# Patient Record
Sex: MALE | Race: WHITE | NOT HISPANIC OR LATINO | Employment: OTHER | URBAN - METROPOLITAN AREA
[De-identification: names, ages, dates, MRNs, and addresses within clinical notes are randomized per-mention and may not be internally consistent; named-entity substitution may affect disease eponyms.]

---

## 2017-01-20 RX ORDER — ERGOCALCIFEROL 1.25 MG/1
50000 CAPSULE ORAL WEEKLY
Qty: 12 CAPSULE | Refills: 0 | Status: SHIPPED | OUTPATIENT
Start: 2017-01-20 | End: 2017-03-24 | Stop reason: SDUPTHER

## 2017-01-30 DIAGNOSIS — K21.9 GASTROESOPHAGEAL REFLUX DISEASE, ESOPHAGITIS PRESENCE NOT SPECIFIED: ICD-10-CM

## 2017-01-30 RX ORDER — PANTOPRAZOLE SODIUM 40 MG/1
TABLET, DELAYED RELEASE ORAL
Qty: 30 TABLET | Refills: 3 | Status: SHIPPED | OUTPATIENT
Start: 2017-01-30 | End: 2017-04-28 | Stop reason: SDUPTHER

## 2017-02-21 ENCOUNTER — OFFICE VISIT (OUTPATIENT)
Dept: INTERNAL MEDICINE | Facility: CLINIC | Age: 62
End: 2017-02-21

## 2017-02-21 VITALS
HEART RATE: 62 BPM | SYSTOLIC BLOOD PRESSURE: 128 MMHG | HEIGHT: 69 IN | RESPIRATION RATE: 16 BRPM | WEIGHT: 154.6 LBS | TEMPERATURE: 98.1 F | DIASTOLIC BLOOD PRESSURE: 62 MMHG | BODY MASS INDEX: 22.9 KG/M2

## 2017-02-21 DIAGNOSIS — I48.0 PAROXYSMAL ATRIAL FIBRILLATION (HCC): ICD-10-CM

## 2017-02-21 DIAGNOSIS — I25.10 CORONARY ARTERY DISEASE INVOLVING NATIVE CORONARY ARTERY OF NATIVE HEART WITHOUT ANGINA PECTORIS: ICD-10-CM

## 2017-02-21 DIAGNOSIS — E78.5 HYPERLIPIDEMIA, UNSPECIFIED HYPERLIPIDEMIA TYPE: ICD-10-CM

## 2017-02-21 DIAGNOSIS — Z11.59 NEED FOR HEPATITIS C SCREENING TEST: ICD-10-CM

## 2017-02-21 DIAGNOSIS — I10 ESSENTIAL HYPERTENSION: Primary | Chronic | ICD-10-CM

## 2017-02-21 DIAGNOSIS — R53.83 FATIGUE, UNSPECIFIED TYPE: ICD-10-CM

## 2017-02-21 DIAGNOSIS — I63.312 CEREBROVASCULAR ACCIDENT (CVA) DUE TO THROMBOSIS OF LEFT MIDDLE CEREBRAL ARTERY (HCC): ICD-10-CM

## 2017-02-21 LAB
BASOPHILS # BLD AUTO: 0.03 10*3/MM3 (ref 0–0.2)
BASOPHILS NFR BLD AUTO: 0.6 % (ref 0–1.5)
CHOLEST SERPL-MCNC: 141 MG/DL (ref 0–200)
EOSINOPHIL # BLD AUTO: 0.13 10*3/MM3 (ref 0–0.7)
EOSINOPHIL NFR BLD AUTO: 2.5 % (ref 0.3–6.2)
ERYTHROCYTE [DISTWIDTH] IN BLOOD BY AUTOMATED COUNT: 14.1 % (ref 11.5–14.5)
HCT VFR BLD AUTO: 41.2 % (ref 40.4–52.2)
HDLC SERPL-MCNC: 64 MG/DL (ref 40–60)
HGB BLD-MCNC: 13.6 G/DL (ref 13.7–17.6)
IMM GRANULOCYTES # BLD: 0 10*3/MM3 (ref 0–0.03)
IMM GRANULOCYTES NFR BLD: 0 % (ref 0–0.5)
LDLC SERPL CALC-MCNC: 47 MG/DL (ref 0–100)
LYMPHOCYTES # BLD AUTO: 1.39 10*3/MM3 (ref 0.9–4.8)
LYMPHOCYTES NFR BLD AUTO: 27.1 % (ref 19.6–45.3)
MCH RBC QN AUTO: 32.4 PG (ref 27–32.7)
MCHC RBC AUTO-ENTMCNC: 33 G/DL (ref 32.6–36.4)
MCV RBC AUTO: 98.1 FL (ref 79.8–96.2)
MONOCYTES # BLD AUTO: 0.54 10*3/MM3 (ref 0.2–1.2)
MONOCYTES NFR BLD AUTO: 10.5 % (ref 5–12)
NEUTROPHILS # BLD AUTO: 3.03 10*3/MM3 (ref 1.9–8.1)
NEUTROPHILS NFR BLD AUTO: 59.3 % (ref 42.7–76)
PLATELET # BLD AUTO: 205 10*3/MM3 (ref 140–500)
RBC # BLD AUTO: 4.2 10*6/MM3 (ref 4.6–6)
TRIGL SERPL-MCNC: 151 MG/DL (ref 0–150)
VLDLC SERPL CALC-MCNC: 30.2 MG/DL (ref 5–40)
WBC # BLD AUTO: 5.12 10*3/MM3 (ref 4.5–10.7)

## 2017-02-21 PROCEDURE — 99214 OFFICE O/P EST MOD 30 MIN: CPT | Performed by: INTERNAL MEDICINE

## 2017-02-21 RX ORDER — CLOPIDOGREL BISULFATE 75 MG/1
75 TABLET ORAL DAILY
COMMUNITY
End: 2017-08-09

## 2017-02-21 NOTE — PROGRESS NOTES
Subjective   Brian Barriga is a 61 y.o. male.     Chief Complaint   Patient presents with   • Hyperlipidemia   • Hypertension         Hyperlipidemia   This is a chronic problem. The current episode started more than 1 year ago. The problem is controlled. Recent lipid tests were reviewed and are normal. Factors aggravating his hyperlipidemia include beta blockers. Pertinent negatives include no chest pain or shortness of breath. Current antihyperlipidemic treatment includes statins. The current treatment provides significant improvement of lipids. There are no compliance problems.  Risk factors for coronary artery disease include dyslipidemia, hypertension and male sex.   Hypertension   This is a chronic problem. The current episode started more than 1 year ago. The problem is unchanged. The problem is controlled. Pertinent negatives include no chest pain, headaches, palpitations, peripheral edema or shortness of breath. There are no associated agents to hypertension. Risk factors for coronary artery disease include dyslipidemia and male gender. Past treatments include beta blockers. The current treatment provides significant improvement. There are no compliance problems.  Hypertensive end-organ damage includes CAD/MI and CVA. There is no history of angina or kidney disease.   Coronary Artery Disease   Presents for follow-up visit. Pertinent negatives include no chest pain, chest pressure, chest tightness, leg swelling, palpitations or shortness of breath. Risk factors include hyperlipidemia. The symptoms have been resolved. Compliance with diet is good. Compliance with exercise is good. Compliance with medications is good.        The following portions of the patient's history were reviewed and updated as appropriate: allergies, current medications, past social history and problem list.    Outpatient Prescriptions Marked as Taking for the 2/21/17 encounter (Office Visit) with Robert Light MD   Medication Sig  Dispense Refill   • aspirin 81 MG EC tablet Take 81 mg by mouth Daily.     • atorvastatin (LIPITOR) 40 MG tablet Take 40 mg by mouth daily.     • azelastine (ASTEPRO) 0.15 % solution nasal spray U 2 SPRAYS IEN D  11   • clopidogrel (PLAVIX) 75 MG tablet Take 75 mg by mouth Daily.     • finasteride (PROSCAR) 5 MG tablet Take 1 tablet by mouth daily.     • fluticasone (FLONASE) 50 MCG/ACT nasal spray SHAKE WELL AND U 2 SPRAYS IEN D  11   • folic acid (FOLVITE) 1 MG tablet TAKE 1 TABLET BY MOUTH DAILY 90 tablet 0   • levocetirizine (XYZAL) 5 MG tablet TAKE 1 TABLET BY MOUTH EVERY EVENING 90 tablet 1   • meclizine (ANTIVERT) 12.5 MG tablet Take 12.5 mg by mouth As Needed for dizziness.     • metoprolol tartrate (LOPRESSOR) 25 MG tablet Take 25 mg by mouth Daily. Half a tab daily     • montelukast (SINGULAIR) 10 MG tablet TAKE 1 TABLET BY MOUTH EVERY DAY 90 tablet 1   • Multiple Vitamin (MULTI VITAMIN DAILY PO) Take  by mouth.     • pantoprazole (PROTONIX) 40 MG EC tablet Take 1 tablet by mouth every morning 30 minutes before breakfast 30 tablet 3   • vitamin D (ERGOCALCIFEROL) 90728 UNITS capsule capsule Take 1 capsule by mouth 1 (One) Time Per Week. 12 capsule 0       Review of Systems   Constitutional: Positive for fatigue. Negative for chills and fever.   Respiratory: Negative for cough, chest tightness, shortness of breath and wheezing.    Cardiovascular: Negative for chest pain, palpitations and leg swelling.   Gastrointestinal: Negative for abdominal pain, constipation, diarrhea, nausea and vomiting.   Neurological: Negative for headaches.       Objective   Vitals:    02/21/17 1437   BP: 128/62   Pulse: 62   Resp: 16   Temp: 98.1 °F (36.7 °C)      Last Weight    02/21/17  1437   Weight: 154 lb 9.6 oz (70.1 kg)    [unfilled]  Body mass index is 22.83 kg/(m^2).      Physical Exam   Constitutional: He appears well-developed and well-nourished. No distress.   HENT:   Head: Normocephalic and atraumatic.   Neck:  Carotid bruit is not present. No thyromegaly present.   Cardiovascular: Normal rate, regular rhythm, normal heart sounds and intact distal pulses.  Exam reveals no gallop.    No murmur heard.  Pulmonary/Chest: Effort normal and breath sounds normal. No respiratory distress. He has no wheezes. He has no rales.   Abdominal: Soft. Bowel sounds are normal. He exhibits no mass. There is no tenderness. There is no guarding.         Problem List Items Addressed This Visit        Cardiovascular and Mediastinum    Hypertension - Primary (Chronic)    Paroxysmal atrial fibrillation    Relevant Medications    clopidogrel (PLAVIX) 75 MG tablet    Hyperlipidemia    Relevant Orders    Lipid Panel    Coronary artery disease involving native coronary artery of native heart    Relevant Medications    clopidogrel (PLAVIX) 75 MG tablet    Stroke syndrome      Other Visit Diagnoses     Fatigue, unspecified type        Relevant Orders    CBC & Differential    Need for hepatitis C screening test            Assessment/Plan   In for recheck of multiple medical problems.  Hypertension, hyperlipidemia, CAD, PAF.  History of stroke and hypercoagulable state.  He's been fatigued for the last several days area no specific reason for this.  He's usually pretty high energy.  Works out regularly.  He says several outbreaks of herpes labialis in the last year.  Had annual lab work August 2016.  Gets lipids every 3 months.  We'll add a CBC today with his fatigue.  Hep C antibody today.         Dragon disclaimer:   Much of this encounter note is an electronic transcription/translation of spoken language to printed text. The electronic translation of spoken language may permit erroneous, or at times, nonsensical words or phrases to be inadvertently transcribed; Although I have reviewed the note for such errors, some may still exist.

## 2017-02-22 LAB — HCV AB S/CO SERPL IA: <0.1 S/CO RATIO (ref 0–0.9)

## 2017-03-15 ENCOUNTER — OFFICE VISIT (OUTPATIENT)
Dept: INTERNAL MEDICINE | Facility: CLINIC | Age: 62
End: 2017-03-15

## 2017-03-15 VITALS
OXYGEN SATURATION: 99 % | SYSTOLIC BLOOD PRESSURE: 106 MMHG | TEMPERATURE: 97.7 F | WEIGHT: 155.4 LBS | HEIGHT: 69 IN | DIASTOLIC BLOOD PRESSURE: 76 MMHG | HEART RATE: 62 BPM | RESPIRATION RATE: 16 BRPM | BODY MASS INDEX: 23.02 KG/M2

## 2017-03-15 DIAGNOSIS — J06.9 ACUTE URI: Primary | ICD-10-CM

## 2017-03-15 DIAGNOSIS — J02.9 SORE THROAT: ICD-10-CM

## 2017-03-15 LAB
EXPIRATION DATE: NORMAL
INTERNAL CONTROL: NORMAL
Lab: NORMAL
S PYO AG THROAT QL: NEGATIVE

## 2017-03-15 PROCEDURE — 99213 OFFICE O/P EST LOW 20 MIN: CPT | Performed by: INTERNAL MEDICINE

## 2017-03-15 PROCEDURE — 87880 STREP A ASSAY W/OPTIC: CPT | Performed by: INTERNAL MEDICINE

## 2017-03-15 RX ORDER — GUAIFENESIN 600 MG/1
1200 TABLET, EXTENDED RELEASE ORAL DAILY PRN
COMMUNITY
End: 2017-04-28

## 2017-03-15 RX ORDER — VALACYCLOVIR HYDROCHLORIDE 1 G/1
TABLET, FILM COATED ORAL
COMMUNITY
Start: 2016-02-14 | End: 2017-03-15

## 2017-03-15 RX ORDER — METOPROLOL SUCCINATE 25 MG/1
TABLET, EXTENDED RELEASE ORAL
Refills: 5 | COMMUNITY
Start: 2017-03-03 | End: 2018-07-25

## 2017-03-15 RX ORDER — VALACYCLOVIR HYDROCHLORIDE 1 G/1
TABLET, FILM COATED ORAL
Refills: 2 | COMMUNITY
Start: 2017-02-14 | End: 2017-11-01

## 2017-03-15 NOTE — PROGRESS NOTES
Subjective   Brian Barriga is a 61 y.o. male.     Chief Complaint   Patient presents with   • Sore Throat         Sore Throat    This is a new problem. The current episode started in the past 7 days. The problem has been gradually worsening. Neither side of throat is experiencing more pain than the other. There has been no fever. Associated symptoms include congestion, ear pain and trouble swallowing. Pertinent negatives include no coughing, ear discharge, headaches, shortness of breath or swollen glands. The treatment provided no relief.        The following portions of the patient's history were reviewed and updated as appropriate: allergies, current medications, past social history and problem list.    Outpatient Prescriptions Marked as Taking for the 3/15/17 encounter (Office Visit) with Robert Light MD   Medication Sig Dispense Refill   • aspirin 81 MG EC tablet Take 81 mg by mouth Daily.     • atorvastatin (LIPITOR) 40 MG tablet Take 40 mg by mouth daily.     • azelastine (ASTEPRO) 0.15 % solution nasal spray U 2 SPRAYS IEN D  11   • clopidogrel (PLAVIX) 75 MG tablet Take 75 mg by mouth Daily.     • finasteride (PROSCAR) 5 MG tablet Take 1 tablet by mouth daily.     • fluticasone (FLONASE) 50 MCG/ACT nasal spray SHAKE WELL AND U 2 SPRAYS IEN D  11   • folic acid (FOLVITE) 1 MG tablet TAKE 1 TABLET BY MOUTH DAILY 90 tablet 0   • guaiFENesin (MUCINEX) 600 MG 12 hr tablet Take 1,200 mg by mouth Daily As Needed for Congestion.     • levocetirizine (XYZAL) 5 MG tablet TAKE 1 TABLET BY MOUTH EVERY EVENING 90 tablet 1   • meclizine (ANTIVERT) 12.5 MG tablet Take 12.5 mg by mouth As Needed for dizziness.     • metoprolol succinate XL (TOPROL-XL) 25 MG 24 hr tablet TK 1 T PO QAM  5   • montelukast (SINGULAIR) 10 MG tablet TAKE 1 TABLET BY MOUTH EVERY DAY 90 tablet 1   • Multiple Vitamin (MULTI VITAMIN DAILY PO) Take 1 tablet by mouth Daily.     • pantoprazole (PROTONIX) 40 MG EC tablet Take 1 tablet by mouth every  morning 30 minutes before breakfast 30 tablet 3   • valACYclovir (VALTREX) 1000 MG tablet TK 2 TS PO Q 12HOURS FOR 1 DAY UTD  2   • vitamin D (ERGOCALCIFEROL) 21947 UNITS capsule capsule Take 1 capsule by mouth 1 (One) Time Per Week. 12 capsule 0   • [DISCONTINUED] valACYclovir (VALTREX) 1000 MG tablet          Review of Systems   Constitutional: Positive for diaphoresis. Negative for chills, fatigue and fever.   HENT: Positive for congestion, ear pain, sneezing, sore throat, trouble swallowing and voice change. Negative for ear discharge, postnasal drip, rhinorrhea and sinus pressure.    Respiratory: Negative for cough, shortness of breath and wheezing.    Neurological: Negative for headaches.       Objective   Vitals:    03/15/17 0920   BP: 106/76   Pulse: 62   Resp: 16   Temp: 97.7 °F (36.5 °C)   SpO2: 99%      Last Weight    03/15/17  0920   Weight: 155 lb 6.4 oz (70.5 kg)    [unfilled]  Body mass index is 22.95 kg/(m^2).      Physical Exam   Constitutional: He appears well-developed and well-nourished.   HENT:   Head: Normocephalic and atraumatic.   Right Ear: External ear normal.   Left Ear: External ear normal.   Nose: Nose normal.   Mouth/Throat: Oropharynx is clear and moist.   Eyes: Conjunctivae are normal. Pupils are equal, round, and reactive to light.   Pulmonary/Chest: Effort normal and breath sounds normal. No respiratory distress. He has no wheezes. He has no rales.   Skin: Skin is warm and dry.         Problem List Items Addressed This Visit     None      Visit Diagnoses     Acute URI    -  Primary        Assessment/Plan   In with 1 week illness.  Sore throat.  Body aches.  Not sure if he is having a fever but these had some drenching sweats at night.  His examination is normal today.  We'll check a strep screen.  This may well be the flu.  We'll discuss symptomatically for now with Tylenol and fluids and rest.         Dragon disclaimer:   Much of this encounter note is an electronic  transcription/translation of spoken language to printed text. The electronic translation of spoken language may permit erroneous, or at times, nonsensical words or phrases to be inadvertently transcribed; Although I have reviewed the note for such errors, some may still exist.

## 2017-03-24 DIAGNOSIS — J30.1 ALLERGIC RHINITIS DUE TO POLLEN: ICD-10-CM

## 2017-03-24 DIAGNOSIS — E78.5 HYPERLIPIDEMIA: ICD-10-CM

## 2017-03-24 RX ORDER — ERGOCALCIFEROL 1.25 MG/1
CAPSULE ORAL
Qty: 12 CAPSULE | Refills: 1 | Status: SHIPPED | OUTPATIENT
Start: 2017-03-24 | End: 2017-08-31 | Stop reason: SDUPTHER

## 2017-03-24 RX ORDER — FOLIC ACID 1 MG/1
TABLET ORAL
Qty: 90 TABLET | Refills: 1 | Status: SHIPPED | OUTPATIENT
Start: 2017-03-24 | End: 2017-04-28

## 2017-03-24 RX ORDER — LEVOCETIRIZINE DIHYDROCHLORIDE 5 MG/1
TABLET, FILM COATED ORAL
Qty: 90 TABLET | Refills: 0 | Status: SHIPPED | OUTPATIENT
Start: 2017-03-24 | End: 2017-08-09

## 2017-04-28 ENCOUNTER — OFFICE VISIT (OUTPATIENT)
Dept: INTERNAL MEDICINE | Facility: CLINIC | Age: 62
End: 2017-04-28

## 2017-04-28 VITALS
TEMPERATURE: 97.6 F | BODY MASS INDEX: 23.96 KG/M2 | HEIGHT: 69 IN | RESPIRATION RATE: 16 BRPM | WEIGHT: 161.8 LBS | OXYGEN SATURATION: 97 % | DIASTOLIC BLOOD PRESSURE: 82 MMHG | HEART RATE: 58 BPM | SYSTOLIC BLOOD PRESSURE: 138 MMHG

## 2017-04-28 DIAGNOSIS — K21.9 GASTROESOPHAGEAL REFLUX DISEASE, ESOPHAGITIS PRESENCE NOT SPECIFIED: ICD-10-CM

## 2017-04-28 DIAGNOSIS — Z71.1 CONCERN ABOUT STD IN MALE WITHOUT DIAGNOSIS: Primary | ICD-10-CM

## 2017-04-28 PROCEDURE — 99214 OFFICE O/P EST MOD 30 MIN: CPT | Performed by: INTERNAL MEDICINE

## 2017-04-28 RX ORDER — PANTOPRAZOLE SODIUM 40 MG/1
TABLET, DELAYED RELEASE ORAL
Qty: 90 TABLET | Refills: 1 | Status: SHIPPED | OUTPATIENT
Start: 2017-04-28 | End: 2017-11-15 | Stop reason: SDUPTHER

## 2017-04-28 RX ORDER — MELOXICAM 7.5 MG/1
7.5 TABLET ORAL DAILY
COMMUNITY
End: 2017-11-01

## 2017-04-28 NOTE — PROGRESS NOTES
Subjective   Brian Barriga is a 61 y.o. male.     Chief Complaint   Patient presents with   • Arm Pain     x 3 months, weakness,   • Hand Pain     x 2 years, requesting STD panel         Arm Pain    The incident occurred more than 1 week ago. There was no injury mechanism. The pain is present in the right forearm, right shoulder and right hand. The quality of the pain is described as aching. The pain radiates to the right arm. The pain is moderate. The pain has been intermittent since the incident. Associated symptoms include muscle weakness. Pertinent negatives include no numbness or tingling.   Hand Pain    The incident occurred more than 1 week ago. The injury mechanism was a direct blow. The pain is present in the right hand. The quality of the pain is described as aching. The pain does not radiate. The pain is moderate. The pain has been intermittent since the incident. Associated symptoms include muscle weakness. Pertinent negatives include no numbness or tingling. The symptoms are aggravated by movement.        The following portions of the patient's history were reviewed and updated as appropriate: allergies, current medications, past social history and problem list.    Outpatient Prescriptions Marked as Taking for the 4/28/17 encounter (Office Visit) with Robert Light MD   Medication Sig Dispense Refill   • aspirin 81 MG EC tablet Take 81 mg by mouth Daily.     • atorvastatin (LIPITOR) 40 MG tablet Take 40 mg by mouth daily.     • azelastine (ASTEPRO) 0.15 % solution nasal spray U 2 SPRAYS IEN D  11   • clopidogrel (PLAVIX) 75 MG tablet Take 75 mg by mouth Daily.     • finasteride (PROSCAR) 5 MG tablet Take 1 tablet by mouth daily.     • fluticasone (FLONASE) 50 MCG/ACT nasal spray SHAKE WELL AND U 2 SPRAYS IEN D  11   • folic acid (FOLVITE) 1 MG tablet TAKE 1 TABLET BY MOUTH DAILY 90 tablet 0   • levocetirizine (XYZAL) 5 MG tablet TAKE 1 TABLET BY MOUTH EVERY EVENING 90 tablet 0   • meclizine  (ANTIVERT) 12.5 MG tablet Take 12.5 mg by mouth As Needed for dizziness.     • meloxicam (MOBIC) 7.5 MG tablet Take 7.5 mg by mouth Daily.     • metoprolol succinate XL (TOPROL-XL) 25 MG 24 hr tablet TK 1 T PO QAM  5   • montelukast (SINGULAIR) 10 MG tablet TAKE 1 TABLET BY MOUTH EVERY DAY 90 tablet 1   • Multiple Vitamin (MULTI VITAMIN DAILY PO) Take 1 tablet by mouth Daily.     • pantoprazole (PROTONIX) 40 MG EC tablet Take 1 tablet by mouth every morning 30 minutes before breakfast 90 tablet 1   • valACYclovir (VALTREX) 1000 MG tablet TK 2 TS PO Q 12HOURS FOR 1 DAY UTD  2   • vitamin D (ERGOCALCIFEROL) 51816 UNITS capsule capsule TAKE 1 CAPSULE BY MOUTH 1 TIME EVERY WEEK 12 capsule 1   • [DISCONTINUED] guaiFENesin (MUCINEX) 600 MG 12 hr tablet Take 1,200 mg by mouth Daily As Needed for Congestion.     • [DISCONTINUED] pantoprazole (PROTONIX) 40 MG EC tablet Take 1 tablet by mouth every morning 30 minutes before breakfast 30 tablet 3       Review of Systems   Constitutional: Negative for chills and fever.   Musculoskeletal: Positive for arthralgias, myalgias and neck pain. Negative for joint swelling and neck stiffness.   Neurological: Negative for tingling and numbness.       Objective   Vitals:    04/28/17 1320   BP: 138/82   Pulse: 58   Resp: 16   Temp: 97.6 °F (36.4 °C)   SpO2: 97%      Last Weight    04/28/17  1320   Weight: 161 lb 12.8 oz (73.4 kg)    [unfilled]  Body mass index is 23.89 kg/(m^2).      Physical Exam   Constitutional: He appears well-developed and well-nourished.   Musculoskeletal: Normal range of motion. He exhibits no edema, tenderness or deformity.   Neurological: He is alert. He has normal reflexes.   Skin: Skin is warm and dry.         Problem List Items Addressed This Visit        Digestive    Gastroesophageal reflux disease (Chronic)    Relevant Medications    pantoprazole (PROTONIX) 40 MG EC tablet        Assessment/Plan   In with pain in right arm.  It starts at the level of the  right index MCP.  He had a partial tear of that tendon a 1 time.  The pain radiates up the forearm all the way up to the shoulder.  He thinks the right arms a little weak.  This is the arm that had a stroke a few years ago.  Initially it was still has stronger arm after rehabilitation and he is right-handed.  It seems like a subsequent got a little weaker.  He's been seen by 2 different hand surgeons including foods and [facility] and Dr. Luna.  Nothing surgical has been offered they can relieve his symptoms.  It may be that this is all related to his stroke.  We'll try on some co Q 10 200 mg per day since he is on atorvastatin.  Options including trying off the atorvastatin for a while or perhaps cutting down on his exercise for a while.  Wants an STD screen.  Has a new sexual partner over past  Year.  Spent 25 minutes with patient over 50% counseling him on STD and reasons for and against screening.  I advised him against testing but he wants a Type 2 test.  He knows he is postive for 1.  Was tested for both about one year ago.         Dragon disclaimer:   Much of this encounter note is an electronic transcription/translation of spoken language to printed text. The electronic translation of spoken language may permit erroneous, or at times, nonsensical words or phrases to be inadvertently transcribed; Although I have reviewed the note for such errors, some may still exist.

## 2017-04-29 LAB — HSV2 IGG SER IA-ACNC: <0.91 INDEX (ref 0–0.9)

## 2017-05-01 LAB
HIV 1+2 AB+HIV1 P24 AG SERPL QL IA: NON REACTIVE
RPR SER QL: NORMAL

## 2017-05-02 LAB
C TRACH RRNA SPEC QL NAA+PROBE: NEGATIVE
N GONORRHOEA RRNA SPEC QL NAA+PROBE: NEGATIVE

## 2017-06-14 RX ORDER — CLOPIDOGREL BISULFATE 75 MG/1
TABLET ORAL
Qty: 90 TABLET | Refills: 0 | OUTPATIENT
Start: 2017-06-14

## 2017-06-22 RX ORDER — CLOPIDOGREL BISULFATE 75 MG/1
TABLET ORAL
Qty: 90 TABLET | Refills: 0 | Status: SHIPPED | OUTPATIENT
Start: 2017-06-22 | End: 2017-09-16 | Stop reason: SDUPTHER

## 2017-06-26 DIAGNOSIS — J30.1 ALLERGIC RHINITIS DUE TO POLLEN: ICD-10-CM

## 2017-06-26 RX ORDER — LEVOCETIRIZINE DIHYDROCHLORIDE 5 MG/1
TABLET, FILM COATED ORAL
Qty: 90 TABLET | Refills: 0 | OUTPATIENT
Start: 2017-06-26

## 2017-07-20 DIAGNOSIS — J30.1 ALLERGIC RHINITIS DUE TO POLLEN: ICD-10-CM

## 2017-07-20 RX ORDER — LEVOCETIRIZINE DIHYDROCHLORIDE 5 MG/1
TABLET, FILM COATED ORAL
Qty: 90 TABLET | Refills: 0 | Status: SHIPPED | OUTPATIENT
Start: 2017-07-20 | End: 2018-07-23 | Stop reason: SDUPTHER

## 2017-08-09 ENCOUNTER — OFFICE VISIT (OUTPATIENT)
Dept: INTERNAL MEDICINE | Facility: CLINIC | Age: 62
End: 2017-08-09

## 2017-08-09 VITALS
RESPIRATION RATE: 16 BRPM | BODY MASS INDEX: 22.63 KG/M2 | HEIGHT: 69 IN | OXYGEN SATURATION: 98 % | SYSTOLIC BLOOD PRESSURE: 104 MMHG | DIASTOLIC BLOOD PRESSURE: 66 MMHG | WEIGHT: 152.8 LBS | HEART RATE: 67 BPM

## 2017-08-09 DIAGNOSIS — I63.312 CEREBROVASCULAR ACCIDENT (CVA) DUE TO THROMBOSIS OF LEFT MIDDLE CEREBRAL ARTERY (HCC): ICD-10-CM

## 2017-08-09 DIAGNOSIS — I48.0 PAROXYSMAL ATRIAL FIBRILLATION (HCC): ICD-10-CM

## 2017-08-09 DIAGNOSIS — I10 ESSENTIAL HYPERTENSION: Primary | Chronic | ICD-10-CM

## 2017-08-09 DIAGNOSIS — K21.9 GASTROESOPHAGEAL REFLUX DISEASE WITHOUT ESOPHAGITIS: Chronic | ICD-10-CM

## 2017-08-09 DIAGNOSIS — D68.59 HYPERCOAGULABLE STATE (HCC): Chronic | ICD-10-CM

## 2017-08-09 DIAGNOSIS — E78.5 HYPERLIPIDEMIA, UNSPECIFIED HYPERLIPIDEMIA TYPE: ICD-10-CM

## 2017-08-09 DIAGNOSIS — Z79.899 MEDICATION MANAGEMENT: ICD-10-CM

## 2017-08-09 DIAGNOSIS — I25.10 CORONARY ARTERY DISEASE INVOLVING NATIVE CORONARY ARTERY OF NATIVE HEART WITHOUT ANGINA PECTORIS: ICD-10-CM

## 2017-08-09 LAB
ALBUMIN SERPL-MCNC: 3.6 G/DL (ref 3.5–5.2)
ALBUMIN/GLOB SERPL: 1.4 G/DL
ALP SERPL-CCNC: 95 U/L (ref 39–117)
ALT SERPL-CCNC: 20 U/L (ref 1–41)
AST SERPL-CCNC: 20 U/L (ref 1–40)
BASOPHILS # BLD AUTO: 0.04 10*3/MM3 (ref 0–0.2)
BASOPHILS NFR BLD AUTO: 0.7 % (ref 0–1.5)
BILIRUB SERPL-MCNC: 0.3 MG/DL (ref 0.1–1.2)
BUN SERPL-MCNC: 12 MG/DL (ref 8–23)
BUN/CREAT SERPL: 13.8 (ref 7–25)
CALCIUM SERPL-MCNC: 8.7 MG/DL (ref 8.6–10.5)
CHLORIDE SERPL-SCNC: 106 MMOL/L (ref 98–107)
CHOLEST SERPL-MCNC: 120 MG/DL (ref 0–200)
CO2 SERPL-SCNC: 29.1 MMOL/L (ref 22–29)
CREAT SERPL-MCNC: 0.87 MG/DL (ref 0.76–1.27)
EOSINOPHIL # BLD AUTO: 0.15 10*3/MM3 (ref 0–0.7)
EOSINOPHIL NFR BLD AUTO: 2.7 % (ref 0.3–6.2)
ERYTHROCYTE [DISTWIDTH] IN BLOOD BY AUTOMATED COUNT: 14.5 % (ref 11.5–14.5)
GLOBULIN SER CALC-MCNC: 2.6 GM/DL
GLUCOSE SERPL-MCNC: 95 MG/DL (ref 65–99)
HCT VFR BLD AUTO: 41.5 % (ref 40.4–52.2)
HDLC SERPL-MCNC: 37 MG/DL (ref 40–60)
HGB BLD-MCNC: 12.7 G/DL (ref 13.7–17.6)
IMM GRANULOCYTES # BLD: 0.02 10*3/MM3 (ref 0–0.03)
IMM GRANULOCYTES NFR BLD: 0.4 % (ref 0–0.5)
LDLC SERPL CALC-MCNC: 55 MG/DL (ref 0–100)
LYMPHOCYTES # BLD AUTO: 1.08 10*3/MM3 (ref 0.9–4.8)
LYMPHOCYTES NFR BLD AUTO: 19.4 % (ref 19.6–45.3)
MCH RBC QN AUTO: 31 PG (ref 27–32.7)
MCHC RBC AUTO-ENTMCNC: 30.6 G/DL (ref 32.6–36.4)
MCV RBC AUTO: 101.2 FL (ref 79.8–96.2)
MONOCYTES # BLD AUTO: 0.68 10*3/MM3 (ref 0.2–1.2)
MONOCYTES NFR BLD AUTO: 12.2 % (ref 5–12)
NEUTROPHILS # BLD AUTO: 3.6 10*3/MM3 (ref 1.9–8.1)
NEUTROPHILS NFR BLD AUTO: 64.6 % (ref 42.7–76)
PLATELET # BLD AUTO: 240 10*3/MM3 (ref 140–500)
POTASSIUM SERPL-SCNC: 4 MMOL/L (ref 3.5–5.2)
PROT SERPL-MCNC: 6.2 G/DL (ref 6–8.5)
RBC # BLD AUTO: 4.1 10*6/MM3 (ref 4.6–6)
SODIUM SERPL-SCNC: 144 MMOL/L (ref 136–145)
TRIGL SERPL-MCNC: 140 MG/DL (ref 0–150)
VLDLC SERPL CALC-MCNC: 28 MG/DL (ref 5–40)
WBC # BLD AUTO: 5.57 10*3/MM3 (ref 4.5–10.7)

## 2017-08-09 PROCEDURE — 99214 OFFICE O/P EST MOD 30 MIN: CPT | Performed by: INTERNAL MEDICINE

## 2017-08-09 RX ORDER — BENZONATATE 200 MG/1
200 CAPSULE ORAL 3 TIMES DAILY PRN
Qty: 20 CAPSULE | Refills: 0 | Status: SHIPPED | OUTPATIENT
Start: 2017-08-09 | End: 2017-11-01

## 2017-08-09 RX ORDER — DOXYCYCLINE HYCLATE 100 MG
TABLET ORAL
Refills: 0 | COMMUNITY
Start: 2017-08-03 | End: 2017-11-01

## 2017-08-09 NOTE — PROGRESS NOTES
Subjective   Brian Barriga is a 62 y.o. male.     Chief Complaint   Patient presents with   • Hyperlipidemia     CAD   • Hypertension   • Atrial Fibrillation         Hyperlipidemia   This is a chronic problem. The current episode started more than 1 year ago. The problem is controlled. Recent lipid tests were reviewed and are normal. Factors aggravating his hyperlipidemia include beta blockers. Pertinent negatives include no chest pain or shortness of breath. Current antihyperlipidemic treatment includes statins. The current treatment provides significant improvement of lipids. There are no compliance problems.  Risk factors for coronary artery disease include dyslipidemia, hypertension and male sex.   Hypertension   This is a chronic problem. The current episode started more than 1 year ago. The problem is unchanged. The problem is controlled. Pertinent negatives include no chest pain, headaches, palpitations, peripheral edema or shortness of breath. There are no associated agents to hypertension. Risk factors for coronary artery disease include dyslipidemia and male gender. Past treatments include beta blockers. The current treatment provides significant improvement. There are no compliance problems.  Hypertensive end-organ damage includes CAD/MI and CVA. There is no history of angina or kidney disease.   Atrial Fibrillation   Presents for follow-up visit. Symptoms are negative for chest pain, palpitations and shortness of breath. The symptoms have been stable. Past medical history includes atrial fibrillation, CAD and hyperlipidemia. There are no medication compliance problems.   Coronary Artery Disease   Presents for follow-up visit. Pertinent negatives include no chest pain, chest pressure, chest tightness, leg swelling, palpitations or shortness of breath. Risk factors include hyperlipidemia. The symptoms have been resolved. Compliance with diet is good. Compliance with exercise is good. Compliance with  medications is good.        The following portions of the patient's history were reviewed and updated as appropriate: allergies, current medications, past social history and problem list.    Outpatient Prescriptions Marked as Taking for the 8/9/17 encounter (Office Visit) with Robert Light MD   Medication Sig Dispense Refill   • aspirin 81 MG EC tablet Take 81 mg by mouth Daily.     • atorvastatin (LIPITOR) 40 MG tablet Take 40 mg by mouth daily.     • azelastine (ASTEPRO) 0.15 % solution nasal spray U 2 SPRAYS IEN D  11   • clopidogrel (PLAVIX) 75 MG tablet TAKE 1 TABLET BY MOUTH EVERY DAY 90 tablet 0   • doxycycline (VIBRAMYICN) 100 MG tablet TK 1 T PO BID  0   • finasteride (PROSCAR) 5 MG tablet Take 1 tablet by mouth daily.     • fluticasone (FLONASE) 50 MCG/ACT nasal spray SHAKE WELL AND U 2 SPRAYS IEN D  11   • folic acid (FOLVITE) 1 MG tablet TAKE 1 TABLET BY MOUTH DAILY 90 tablet 0   • levocetirizine (XYZAL) 5 MG tablet TAKE 1 TABLET BY MOUTH EVERY EVENING 90 tablet 0   • meclizine (ANTIVERT) 12.5 MG tablet Take 12.5 mg by mouth As Needed for dizziness.     • meloxicam (MOBIC) 7.5 MG tablet Take 7.5 mg by mouth Daily.     • metoprolol succinate XL (TOPROL-XL) 25 MG 24 hr tablet TK 1 T PO QAM  5   • montelukast (SINGULAIR) 10 MG tablet TAKE 1 TABLET BY MOUTH EVERY DAY 90 tablet 1   • Multiple Vitamin (MULTI VITAMIN DAILY PO) Take 1 tablet by mouth Daily.     • pantoprazole (PROTONIX) 40 MG EC tablet Take 1 tablet by mouth every morning 30 minutes before breakfast 90 tablet 1   • valACYclovir (VALTREX) 1000 MG tablet TK 2 TS PO Q 12HOURS FOR 1 DAY UTD  2   • vitamin D (ERGOCALCIFEROL) 73314 UNITS capsule capsule TAKE 1 CAPSULE BY MOUTH 1 TIME EVERY WEEK 12 capsule 1       Review of Systems   Constitutional: Positive for fatigue. Negative for chills and fever.   Respiratory: Positive for cough. Negative for chest tightness, shortness of breath and wheezing.    Cardiovascular: Negative for chest pain,  palpitations and leg swelling.   Gastrointestinal: Negative for abdominal pain, constipation, diarrhea, nausea and vomiting.   Neurological: Negative for headaches.       Objective   Vitals:    08/09/17 0811   BP: 104/66   Pulse: 67   Resp: 16   SpO2: 98%      Last Weight    08/09/17 0811   Weight: 152 lb 12.8 oz (69.3 kg)    [unfilled]  Body mass index is 22.56 kg/(m^2).      Physical Exam   Constitutional: He appears well-developed and well-nourished. No distress.   HENT:   Head: Normocephalic and atraumatic.   Neck: Carotid bruit is not present. No thyromegaly present.   Cardiovascular: Normal rate, regular rhythm, normal heart sounds and intact distal pulses.  Exam reveals no gallop.    No murmur heard.  Pulmonary/Chest: Effort normal and breath sounds normal. No respiratory distress. He has no wheezes. He has no rales.   Abdominal: Soft. Bowel sounds are normal. He exhibits no mass. There is no tenderness. There is no guarding.         Problem List Items Addressed This Visit        Cardiovascular and Mediastinum    Hypertension - Primary (Chronic)    Paroxysmal atrial fibrillation    Hyperlipidemia    Coronary artery disease involving native coronary artery of native heart    Stroke syndrome       Digestive    Gastroesophageal reflux disease (Chronic)       Hematopoietic and Hemostatic    Hypercoagulable state (Chronic)        Assessment/Plan   In for recheck of multiple medical problems.  Hypertension, hyperlipidemia, CAD, PAF.  History of stroke and hypercoagulable state.  No recent cardiac symptoms.  No pulmonary symptoms other than his bronchitis.  No GI symptoms.  Blood pressure and lipid control is been excellent.  He's been fatigued.  He's usually pretty high energy.  Works out regularly.  Had a cough for about 2 weeks.  Feels a round of Zithromax and now is on doxycycline improving.  Had annual lab work August 2016.  Gets lipids every 3 months.  Urologist checks his PSA and prostate exam.          Dragon disclaimer:   Much of this encounter note is an electronic transcription/translation of spoken language to printed text. The electronic translation of spoken language may permit erroneous, or at times, nonsensical words or phrases to be inadvertently transcribed; Although I have reviewed the note for such errors, some may still exist.

## 2017-08-31 RX ORDER — ERGOCALCIFEROL 1.25 MG/1
CAPSULE ORAL
Qty: 12 CAPSULE | Refills: 0 | Status: SHIPPED | OUTPATIENT
Start: 2017-08-31 | End: 2018-03-06 | Stop reason: SDUPTHER

## 2017-09-12 DIAGNOSIS — E78.5 HYPERLIPIDEMIA: ICD-10-CM

## 2017-09-12 RX ORDER — FOLIC ACID 1 MG/1
TABLET ORAL
Qty: 90 TABLET | Refills: 0 | Status: SHIPPED | OUTPATIENT
Start: 2017-09-12 | End: 2017-11-01 | Stop reason: SDUPTHER

## 2017-09-18 RX ORDER — CLOPIDOGREL BISULFATE 75 MG/1
TABLET ORAL
Qty: 90 TABLET | Refills: 0 | Status: SHIPPED | OUTPATIENT
Start: 2017-09-18 | End: 2019-01-18

## 2017-09-21 RX ORDER — ATORVASTATIN CALCIUM 40 MG/1
TABLET, FILM COATED ORAL
Qty: 90 TABLET | Refills: 0 | Status: SHIPPED | OUTPATIENT
Start: 2017-09-21 | End: 2017-12-14 | Stop reason: SDUPTHER

## 2017-10-11 DIAGNOSIS — J30.1 ALLERGIC RHINITIS DUE TO POLLEN: ICD-10-CM

## 2017-10-11 RX ORDER — LEVOCETIRIZINE DIHYDROCHLORIDE 5 MG/1
TABLET, FILM COATED ORAL
Qty: 90 TABLET | Refills: 0 | Status: SHIPPED | OUTPATIENT
Start: 2017-10-11 | End: 2017-11-01 | Stop reason: SDUPTHER

## 2017-11-01 ENCOUNTER — OFFICE VISIT (OUTPATIENT)
Dept: INTERNAL MEDICINE | Facility: CLINIC | Age: 62
End: 2017-11-01

## 2017-11-01 VITALS
TEMPERATURE: 97.9 F | RESPIRATION RATE: 16 BRPM | HEIGHT: 69 IN | SYSTOLIC BLOOD PRESSURE: 130 MMHG | BODY MASS INDEX: 23.11 KG/M2 | HEART RATE: 60 BPM | DIASTOLIC BLOOD PRESSURE: 84 MMHG | WEIGHT: 156 LBS | OXYGEN SATURATION: 98 %

## 2017-11-01 DIAGNOSIS — I10 ESSENTIAL HYPERTENSION: Primary | Chronic | ICD-10-CM

## 2017-11-01 DIAGNOSIS — I25.10 CORONARY ARTERY DISEASE INVOLVING NATIVE CORONARY ARTERY OF NATIVE HEART WITHOUT ANGINA PECTORIS: ICD-10-CM

## 2017-11-01 DIAGNOSIS — E78.5 HYPERLIPIDEMIA, UNSPECIFIED HYPERLIPIDEMIA TYPE: ICD-10-CM

## 2017-11-01 DIAGNOSIS — K21.9 GASTROESOPHAGEAL REFLUX DISEASE WITHOUT ESOPHAGITIS: Chronic | ICD-10-CM

## 2017-11-01 LAB
CHOLEST SERPL-MCNC: 141 MG/DL (ref 0–200)
HDLC SERPL-MCNC: 62 MG/DL (ref 40–60)
LDLC SERPL CALC-MCNC: 65 MG/DL (ref 0–100)
TRIGL SERPL-MCNC: 70 MG/DL (ref 0–150)
VLDLC SERPL CALC-MCNC: 14 MG/DL (ref 5–40)

## 2017-11-01 PROCEDURE — 99214 OFFICE O/P EST MOD 30 MIN: CPT | Performed by: INTERNAL MEDICINE

## 2017-11-01 NOTE — PROGRESS NOTES
Subjective   Brian Barriga is a 62 y.o. male.     Chief Complaint   Patient presents with   • Hyperlipidemia   • Hypertension   • Coronary Artery Disease         Hyperlipidemia   This is a chronic problem. The current episode started more than 1 year ago. The problem is controlled. Recent lipid tests were reviewed and are normal. Factors aggravating his hyperlipidemia include beta blockers. Pertinent negatives include no chest pain or shortness of breath. Current antihyperlipidemic treatment includes statins. The current treatment provides significant improvement of lipids. There are no compliance problems.  Risk factors for coronary artery disease include dyslipidemia, hypertension and male sex.   Hypertension   This is a chronic problem. The current episode started more than 1 year ago. The problem is unchanged. The problem is controlled. Pertinent negatives include no chest pain, headaches, palpitations, peripheral edema or shortness of breath. There are no associated agents to hypertension. Risk factors for coronary artery disease include dyslipidemia and male gender. Past treatments include beta blockers. The current treatment provides significant improvement. There are no compliance problems.  Hypertensive end-organ damage includes CAD/MI and CVA. There is no history of angina or kidney disease.   Coronary Artery Disease   Presents for follow-up visit. Pertinent negatives include no chest pain, chest pressure, chest tightness, leg swelling, palpitations or shortness of breath. Risk factors include hyperlipidemia. The symptoms have been resolved. Compliance with diet is good. Compliance with exercise is good. Compliance with medications is good.   Atrial Fibrillation   Presents for follow-up visit. Symptoms are negative for chest pain, palpitations and shortness of breath. The symptoms have been stable. Past medical history includes atrial fibrillation, CAD and hyperlipidemia. There are no medication  compliance problems.        The following portions of the patient's history were reviewed and updated as appropriate: allergies, current medications, past social history and problem list.    Outpatient Prescriptions Marked as Taking for the 11/1/17 encounter (Office Visit) with Robert Light MD   Medication Sig Dispense Refill   • aspirin 81 MG EC tablet Take 81 mg by mouth Daily.     • atorvastatin (LIPITOR) 40 MG tablet TAKE 1 TABLET BY MOUTH EVERY MORNING 90 tablet 0   • BREO ELLIPTA 100-25 MCG/INH aerosol powder  INL 1 PUFF PO QD  5   • clopidogrel (PLAVIX) 75 MG tablet TAKE 1 TABLET BY MOUTH EVERY DAY 90 tablet 0   • finasteride (PROSCAR) 5 MG tablet Take 1 tablet by mouth daily.     • folic acid (FOLVITE) 1 MG tablet TAKE 1 TABLET BY MOUTH DAILY 90 tablet 0   • levocetirizine (XYZAL) 5 MG tablet TAKE 1 TABLET BY MOUTH EVERY EVENING 90 tablet 0   • metoprolol succinate XL (TOPROL-XL) 25 MG 24 hr tablet TK 1 T PO QAM  5   • montelukast (SINGULAIR) 10 MG tablet TAKE 1 TABLET BY MOUTH EVERY DAY 90 tablet 1   • Multiple Vitamin (MULTI VITAMIN DAILY PO) Take 1 tablet by mouth Daily.     • pantoprazole (PROTONIX) 40 MG EC tablet Take 1 tablet by mouth every morning 30 minutes before breakfast (Patient taking differently: Take 40 mg by mouth 2 (Two) Times a Day. Take 1 tablet by mouth every morning 30 minutes before breakfast) 90 tablet 1   • vitamin D (ERGOCALCIFEROL) 53816 units capsule capsule TAKE 1 CAPSULE BY MOUTH 1 TIME EVERY WEEK 12 capsule 0       Review of Systems   Constitutional: Negative for chills, fatigue and fever.   Respiratory: Negative for cough, chest tightness, shortness of breath and wheezing.    Cardiovascular: Negative for chest pain, palpitations and leg swelling.   Gastrointestinal: Positive for abdominal pain (gerd). Negative for constipation, diarrhea, nausea and vomiting.   Neurological: Negative for headaches.       Objective   Vitals:    11/01/17 0833   BP: 130/84   Pulse: 60   Resp: 16    Temp: 97.9 °F (36.6 °C)   SpO2: 98%      Last Weight    11/01/17  0833   Weight: 156 lb (70.8 kg)    [unfilled]  Body mass index is 23.04 kg/(m^2).      Physical Exam   Constitutional: He appears well-developed and well-nourished. No distress.   HENT:   Head: Normocephalic and atraumatic.   Neck: Carotid bruit is not present. No thyromegaly present.   Cardiovascular: Normal rate, regular rhythm, normal heart sounds and intact distal pulses.  Exam reveals no gallop.    No murmur heard.  Pulmonary/Chest: Effort normal and breath sounds normal. No respiratory distress. He has no wheezes. He has no rales.   Abdominal: Soft. Bowel sounds are normal. He exhibits no mass. There is no tenderness. There is no guarding.         Problem List Items Addressed This Visit        Cardiovascular and Mediastinum    Hypertension - Primary (Chronic)    Hyperlipidemia    Coronary artery disease involving native coronary artery of native heart       Digestive    Gastroesophageal reflux disease (Chronic)        Assessment/Plan   In for recheck of multiple medical problems.  Hypertension, hyperlipidemia, CAD, PAF.  History of stroke and hypercoagulable state.  No recent cardiac symptoms.  No pulmonary symptoms other than his bronchitis.  Some increased symptoms of reflux over the last 2 months.  Blood pressure and lipid control is been excellent.  Had annual lab work August 2017.  Gets lipids every 3 months.  Urologist checks his PSA and prostate exam.           Dragon disclaimer:   Much of this encounter note is an electronic transcription/translation of spoken language to printed text. The electronic translation of spoken language may permit erroneous, or at times, nonsensical words or phrases to be inadvertently transcribed; Although I have reviewed the note for such errors, some may still exist.

## 2017-11-15 DIAGNOSIS — K21.9 GASTROESOPHAGEAL REFLUX DISEASE, ESOPHAGITIS PRESENCE NOT SPECIFIED: ICD-10-CM

## 2017-11-15 RX ORDER — PANTOPRAZOLE SODIUM 40 MG/1
TABLET, DELAYED RELEASE ORAL
Qty: 90 TABLET | Refills: 1 | Status: SHIPPED | OUTPATIENT
Start: 2017-11-15 | End: 2018-05-30 | Stop reason: SDUPTHER

## 2017-12-05 DIAGNOSIS — E78.5 HYPERLIPIDEMIA: ICD-10-CM

## 2017-12-05 RX ORDER — FOLIC ACID 1 MG/1
TABLET ORAL
Qty: 90 TABLET | Refills: 0 | Status: SHIPPED | OUTPATIENT
Start: 2017-12-05 | End: 2018-03-27 | Stop reason: SDUPTHER

## 2017-12-14 RX ORDER — ATORVASTATIN CALCIUM 40 MG/1
TABLET, FILM COATED ORAL
Qty: 90 TABLET | Refills: 0 | Status: SHIPPED | OUTPATIENT
Start: 2017-12-14 | End: 2018-03-12 | Stop reason: SDUPTHER

## 2018-01-16 DIAGNOSIS — J30.1 ALLERGIC RHINITIS DUE TO POLLEN: ICD-10-CM

## 2018-01-17 RX ORDER — LEVOCETIRIZINE DIHYDROCHLORIDE 5 MG/1
TABLET, FILM COATED ORAL
Qty: 90 TABLET | Refills: 0 | Status: SHIPPED | OUTPATIENT
Start: 2018-01-17 | End: 2018-03-27 | Stop reason: SDUPTHER

## 2018-01-24 ENCOUNTER — OFFICE VISIT (OUTPATIENT)
Dept: INTERNAL MEDICINE | Facility: CLINIC | Age: 63
End: 2018-01-24

## 2018-01-24 VITALS
BODY MASS INDEX: 23.37 KG/M2 | HEART RATE: 58 BPM | DIASTOLIC BLOOD PRESSURE: 72 MMHG | WEIGHT: 157.8 LBS | HEIGHT: 69 IN | TEMPERATURE: 97.6 F | SYSTOLIC BLOOD PRESSURE: 120 MMHG | RESPIRATION RATE: 16 BRPM | OXYGEN SATURATION: 96 %

## 2018-01-24 DIAGNOSIS — I48.0 PAROXYSMAL ATRIAL FIBRILLATION (HCC): ICD-10-CM

## 2018-01-24 DIAGNOSIS — K21.9 GASTROESOPHAGEAL REFLUX DISEASE WITHOUT ESOPHAGITIS: Chronic | ICD-10-CM

## 2018-01-24 DIAGNOSIS — G47.33 OBSTRUCTIVE SLEEP APNEA SYNDROME: Chronic | ICD-10-CM

## 2018-01-24 DIAGNOSIS — E78.5 HYPERLIPIDEMIA, UNSPECIFIED HYPERLIPIDEMIA TYPE: ICD-10-CM

## 2018-01-24 DIAGNOSIS — D68.59 HYPERCOAGULABLE STATE (HCC): Chronic | ICD-10-CM

## 2018-01-24 DIAGNOSIS — I25.10 CORONARY ARTERY DISEASE INVOLVING NATIVE CORONARY ARTERY OF NATIVE HEART WITHOUT ANGINA PECTORIS: ICD-10-CM

## 2018-01-24 DIAGNOSIS — I10 ESSENTIAL HYPERTENSION: Primary | Chronic | ICD-10-CM

## 2018-01-24 LAB
CHOLEST SERPL-MCNC: 153 MG/DL (ref 0–200)
HDLC SERPL-MCNC: 64 MG/DL (ref 40–60)
LDLC SERPL CALC-MCNC: 69 MG/DL (ref 0–100)
TRIGL SERPL-MCNC: 101 MG/DL (ref 0–150)
VLDLC SERPL CALC-MCNC: 20.2 MG/DL (ref 5–40)

## 2018-01-24 PROCEDURE — 99214 OFFICE O/P EST MOD 30 MIN: CPT | Performed by: INTERNAL MEDICINE

## 2018-01-24 NOTE — PROGRESS NOTES
Subjective   Brian Barriga is a 62 y.o. male.     Chief Complaint   Patient presents with   • Hypertension     paf   • Hyperlipidemia   • Coronary Artery Disease         Hypertension   This is a chronic problem. The current episode started more than 1 year ago. The problem is unchanged. The problem is controlled. Pertinent negatives include no chest pain, headaches, palpitations, peripheral edema or shortness of breath. There are no associated agents to hypertension. Risk factors for coronary artery disease include dyslipidemia and male gender. Past treatments include beta blockers. The current treatment provides significant improvement. There are no compliance problems.  Hypertensive end-organ damage includes CAD/MI and CVA. There is no history of angina or kidney disease.   Hyperlipidemia   This is a chronic problem. The current episode started more than 1 year ago. The problem is controlled. Recent lipid tests were reviewed and are normal. Factors aggravating his hyperlipidemia include beta blockers. Pertinent negatives include no chest pain or shortness of breath. Current antihyperlipidemic treatment includes statins. The current treatment provides significant improvement of lipids. There are no compliance problems.  Risk factors for coronary artery disease include dyslipidemia, hypertension and male sex.   Coronary Artery Disease   Presents for follow-up visit. Pertinent negatives include no chest pain, chest pressure, chest tightness, leg swelling, palpitations or shortness of breath. Risk factors include hyperlipidemia. The symptoms have been resolved. Compliance with diet is good. Compliance with exercise is good. Compliance with medications is good.   Atrial Fibrillation   Presents for follow-up visit. Symptoms are negative for chest pain, palpitations and shortness of breath. The symptoms have been stable. Past medical history includes atrial fibrillation, CAD and hyperlipidemia. There are no medication  compliance problems.        The following portions of the patient's history were reviewed and updated as appropriate: allergies, current medications, past social history and problem list.    No outpatient prescriptions have been marked as taking for the 1/24/18 encounter (Office Visit) with Robert Light MD.       Review of Systems   Constitutional: Negative for chills, fatigue and fever.   Respiratory: Negative for cough, chest tightness, shortness of breath and wheezing.    Cardiovascular: Negative for chest pain, palpitations and leg swelling.   Gastrointestinal: Positive for abdominal pain (gerd). Negative for constipation, diarrhea, nausea and vomiting.   Neurological: Negative for headaches.       Objective   Vitals:    01/24/18 0856   BP: 120/72   Pulse: 58   Resp: 16   Temp: 97.6 °F (36.4 °C)   SpO2: 96%      Last Weight    01/24/18  0856   Weight: 71.6 kg (157 lb 12.8 oz)    [unfilled]  Body mass index is 23.29 kg/(m^2).      Physical Exam   Constitutional: He appears well-developed and well-nourished. No distress.   HENT:   Head: Normocephalic and atraumatic.   Neck: Carotid bruit is not present. No thyromegaly present.   Cardiovascular: Normal rate, regular rhythm, normal heart sounds and intact distal pulses.  Exam reveals no gallop.    No murmur heard.  Pulmonary/Chest: Effort normal and breath sounds normal. No respiratory distress. He has no wheezes. He has no rales.   Abdominal: Soft. Bowel sounds are normal. He exhibits no mass. There is no tenderness. There is no guarding.         Problem List Items Addressed This Visit        Cardiovascular and Mediastinum    Hypertension - Primary (Chronic)    Paroxysmal atrial fibrillation    Hyperlipidemia    Coronary artery disease involving native coronary artery of native heart       Respiratory    Obstructive sleep apnea syndrome (Chronic)       Digestive    Gastroesophageal reflux disease (Chronic)       Hematopoietic and Hemostatic    Hypercoagulable  state (Chronic)        Assessment/Plan   In for recheck of multiple medical problems.  Hypertension, hyperlipidemia, CAD, PAF.  History of stroke and hypercoagulable state.  Blood pressure control is excellent.  Lipids are pending.  No arrhythmias.  Some chest pressure or awareness in the chest with walking around the house or upstairs but not with his exercise program.  Had annual lab work August 2017.  Gets lipids every 3 months.  Urologist checks his PSA and prostate exam.            Dragon disclaimer:   Much of this encounter note is an electronic transcription/translation of spoken language to printed text. The electronic translation of spoken language may permit erroneous, or at times, nonsensical words or phrases to be inadvertently transcribed; Although I have reviewed the note for such errors, some may still exist.

## 2018-02-27 DIAGNOSIS — E78.5 HYPERLIPIDEMIA: ICD-10-CM

## 2018-02-27 RX ORDER — FOLIC ACID 1 MG/1
TABLET ORAL
Qty: 90 TABLET | Refills: 0 | Status: SHIPPED | OUTPATIENT
Start: 2018-02-27 | End: 2018-03-27 | Stop reason: SDUPTHER

## 2018-03-07 RX ORDER — ERGOCALCIFEROL 1.25 MG/1
CAPSULE ORAL
Qty: 12 CAPSULE | Refills: 0 | Status: SHIPPED | OUTPATIENT
Start: 2018-03-07 | End: 2018-05-30 | Stop reason: SDUPTHER

## 2018-03-13 RX ORDER — ATORVASTATIN CALCIUM 40 MG/1
TABLET, FILM COATED ORAL
Qty: 90 TABLET | Refills: 0 | Status: SHIPPED | OUTPATIENT
Start: 2018-03-13 | End: 2018-06-05 | Stop reason: SDUPTHER

## 2018-03-27 ENCOUNTER — OFFICE VISIT (OUTPATIENT)
Dept: INTERNAL MEDICINE | Facility: CLINIC | Age: 63
End: 2018-03-27

## 2018-03-27 VITALS
TEMPERATURE: 98.3 F | BODY MASS INDEX: 23.25 KG/M2 | RESPIRATION RATE: 16 BRPM | WEIGHT: 157 LBS | OXYGEN SATURATION: 95 % | DIASTOLIC BLOOD PRESSURE: 70 MMHG | HEART RATE: 66 BPM | SYSTOLIC BLOOD PRESSURE: 146 MMHG | HEIGHT: 69 IN

## 2018-03-27 DIAGNOSIS — B35.6 TINEA CRURIS: Primary | ICD-10-CM

## 2018-03-27 PROCEDURE — 99213 OFFICE O/P EST LOW 20 MIN: CPT | Performed by: INTERNAL MEDICINE

## 2018-03-27 RX ORDER — CLOTRIMAZOLE AND BETAMETHASONE DIPROPIONATE 10; .64 MG/G; MG/G
CREAM TOPICAL EVERY 12 HOURS SCHEDULED
Qty: 15 G | Refills: 0 | Status: SHIPPED | OUTPATIENT
Start: 2018-03-27 | End: 2018-07-25

## 2018-03-27 NOTE — PROGRESS NOTES
Subjective   Brian Barriga is a 62 y.o. male.     Chief Complaint   Patient presents with   • Recurrent Skin Infections         In with a fine rash and itching in the testicular region.  Symptoms for one week.  He's used clotrimazole with betamethasone in the past with some success.  He's been using some Lotrimin spray without success.  One tiny area of skin breakdown is healing up.  Has used Naftin gel for that.         The following portions of the patient's history were reviewed and updated as appropriate: allergies, current medications, past social history and problem list.    Outpatient Prescriptions Marked as Taking for the 3/27/18 encounter (Office Visit) with Robert Light MD   Medication Sig Dispense Refill   • aspirin 81 MG EC tablet Take 81 mg by mouth Daily.     • atorvastatin (LIPITOR) 40 MG tablet TAKE 1 TABLET BY MOUTH EVERY MORNING 90 tablet 0   • BREO ELLIPTA 100-25 MCG/INH aerosol powder  INL 1 PUFF PO QD  5   • clopidogrel (PLAVIX) 75 MG tablet TAKE 1 TABLET BY MOUTH EVERY DAY 90 tablet 0   • finasteride (PROSCAR) 5 MG tablet Take 1 tablet by mouth daily.     • folic acid (FOLVITE) 1 MG tablet TAKE 1 TABLET BY MOUTH DAILY 90 tablet 0   • levocetirizine (XYZAL) 5 MG tablet TAKE 1 TABLET BY MOUTH EVERY EVENING 90 tablet 0   • metoprolol succinate XL (TOPROL-XL) 25 MG 24 hr tablet TK 1 T PO QAM  5   • montelukast (SINGULAIR) 10 MG tablet TAKE 1 TABLET BY MOUTH EVERY DAY 90 tablet 1   • Multiple Vitamin (MULTI VITAMIN DAILY PO) Take 1 tablet by mouth Daily.     • pantoprazole (PROTONIX) 40 MG EC tablet Take 1 tablet by mouth every morning 30 minutes before breakfast 90 tablet 1   • vitamin D (ERGOCALCIFEROL) 95695 units capsule capsule TAKE 1 CAPSULE BY MOUTH 1 TIME EVERY WEEK 12 capsule 0       Review of Systems   Constitutional: Positive for chills, fatigue and fever.   Skin: Positive for rash. Negative for color change and wound.       Objective   Vitals:    03/27/18 1544   BP: 146/70   Pulse:  66   Resp: 16   Temp: 98.3 °F (36.8 °C)   SpO2: 95%      1    03/27/18  1544   Weight: 71.2 kg (157 lb)    [unfilled]  Body mass index is 23.17 kg/m².      Physical Exam   Constitutional: He appears well-developed and well-nourished.   Skin: Skin is warm and dry. No rash noted. No erythema. No pallor.         Problem List Items Addressed This Visit        Musculoskeletal and Integument    Tinea cruris - Primary      Other Visit Diagnoses    None.       Assessment/Plan   In with a rash on the scrotal area for about one week.  Mainly itching.  Not much of a rash.  One tiny denuded area looks like it's from trauma.  He's been feeling under the weather in the last week.  Fatigued and sleepy.  The general areas unremarkable.  He's used a combination of clotrimazole and betamethasone in the past which should be fine for this.  He may be developing some type of viral infection on top of everything else.         Tricia disclaimer:   Much of this encounter note is an electronic transcription/translation of spoken language to printed text. The electronic translation of spoken language may permit erroneous, or at times, nonsensical words or phrases to be inadvertently transcribed; Although I have reviewed the note for such errors, some may still exist.

## 2018-04-13 ENCOUNTER — OFFICE VISIT (OUTPATIENT)
Dept: INTERNAL MEDICINE | Facility: CLINIC | Age: 63
End: 2018-04-13

## 2018-04-13 VITALS
TEMPERATURE: 97.8 F | WEIGHT: 156.4 LBS | HEART RATE: 85 BPM | SYSTOLIC BLOOD PRESSURE: 112 MMHG | RESPIRATION RATE: 16 BRPM | HEIGHT: 69 IN | OXYGEN SATURATION: 98 % | DIASTOLIC BLOOD PRESSURE: 68 MMHG | BODY MASS INDEX: 23.16 KG/M2

## 2018-04-13 DIAGNOSIS — K21.9 GASTROESOPHAGEAL REFLUX DISEASE WITHOUT ESOPHAGITIS: Chronic | ICD-10-CM

## 2018-04-13 DIAGNOSIS — I10 ESSENTIAL HYPERTENSION: Primary | Chronic | ICD-10-CM

## 2018-04-13 DIAGNOSIS — I25.10 CORONARY ARTERY DISEASE INVOLVING NATIVE CORONARY ARTERY OF NATIVE HEART WITHOUT ANGINA PECTORIS: ICD-10-CM

## 2018-04-13 DIAGNOSIS — E78.5 HYPERLIPIDEMIA, UNSPECIFIED HYPERLIPIDEMIA TYPE: ICD-10-CM

## 2018-04-13 DIAGNOSIS — G47.33 OBSTRUCTIVE SLEEP APNEA SYNDROME: Chronic | ICD-10-CM

## 2018-04-13 DIAGNOSIS — I48.0 PAROXYSMAL ATRIAL FIBRILLATION (HCC): ICD-10-CM

## 2018-04-13 LAB
CHOLEST SERPL-MCNC: 152 MG/DL (ref 0–200)
HDLC SERPL-MCNC: 59 MG/DL (ref 40–60)
LDLC SERPL CALC-MCNC: 70 MG/DL (ref 0–100)
TRIGL SERPL-MCNC: 113 MG/DL (ref 0–150)
VLDLC SERPL CALC-MCNC: 22.6 MG/DL (ref 5–40)

## 2018-04-13 PROCEDURE — 99214 OFFICE O/P EST MOD 30 MIN: CPT | Performed by: INTERNAL MEDICINE

## 2018-04-13 RX ORDER — VALACYCLOVIR HYDROCHLORIDE 1 G/1
TABLET, FILM COATED ORAL
Refills: 2 | COMMUNITY
Start: 2018-01-18 | End: 2019-01-18

## 2018-04-13 RX ORDER — ERYTHROMYCIN 20 MG/G
GEL TOPICAL
Refills: 0 | COMMUNITY
Start: 2018-03-30 | End: 2018-10-24

## 2018-04-13 NOTE — PROGRESS NOTES
Subjective   Brian Barriga is a 62 y.o. male.     Chief Complaint   Patient presents with   • Hypertension     cll   • Hyperlipidemia   • Hyperglycemia         Hypertension   This is a chronic problem. The current episode started more than 1 year ago. The problem is unchanged. The problem is controlled. Associated symptoms include chest pain. Pertinent negatives include no headaches, palpitations, peripheral edema or shortness of breath. There are no associated agents to hypertension. Risk factors for coronary artery disease include dyslipidemia and male gender. Current antihypertension treatment includes beta blockers. The current treatment provides significant improvement. There are no compliance problems.  Hypertensive end-organ damage includes CAD/MI and CVA. There is no history of angina or kidney disease.   Hyperlipidemia   This is a chronic problem. The current episode started more than 1 year ago. The problem is controlled. Recent lipid tests were reviewed and are normal. Factors aggravating his hyperlipidemia include beta blockers. Associated symptoms include chest pain. Pertinent negatives include no shortness of breath. Current antihyperlipidemic treatment includes statins. The current treatment provides significant improvement of lipids. There are no compliance problems.  Risk factors for coronary artery disease include dyslipidemia, hypertension and male sex.   Hyperglycemia   This is a chronic problem. The current episode started more than 1 month ago. Associated symptoms include abdominal pain (gerd), chest pain, chills and fatigue. Pertinent negatives include no coughing, fever, headaches, nausea or vomiting.   Coronary Artery Disease   Presents for follow-up visit. Symptoms include chest pain. Pertinent negatives include no chest pressure, chest tightness, leg swelling, palpitations or shortness of breath. Risk factors include hyperlipidemia. The symptoms have been resolved. Compliance with diet  is good. Compliance with exercise is good. Compliance with medications is good.   Atrial Fibrillation   Presents for follow-up visit. Symptoms include chest pain. Symptoms are negative for palpitations and shortness of breath. The symptoms have been stable. Past medical history includes atrial fibrillation, CAD and hyperlipidemia. There are no medication compliance problems.        The following portions of the patient's history were reviewed and updated as appropriate: allergies, current medications, past social history and problem list.    Outpatient Prescriptions Marked as Taking for the 4/13/18 encounter (Office Visit) with Robert Light MD   Medication Sig Dispense Refill   • aspirin 81 MG EC tablet Take 81 mg by mouth Daily.     • atorvastatin (LIPITOR) 40 MG tablet TAKE 1 TABLET BY MOUTH EVERY MORNING 90 tablet 0   • BREO ELLIPTA 100-25 MCG/INH aerosol powder  INL 1 PUFF PO QD  5   • clopidogrel (PLAVIX) 75 MG tablet TAKE 1 TABLET BY MOUTH EVERY DAY 90 tablet 0   • clotrimazole-betamethasone (LOTRISONE) 1-0.05 % cream Apply  topically Every 12 (Twelve) Hours. 15 g 0   • erythromycin with ethanol (EMGEL) 2 % gel   0   • finasteride (PROSCAR) 5 MG tablet Take 1 tablet by mouth daily.     • folic acid (FOLVITE) 1 MG tablet TAKE 1 TABLET BY MOUTH DAILY 90 tablet 0   • levocetirizine (XYZAL) 5 MG tablet TAKE 1 TABLET BY MOUTH EVERY EVENING 90 tablet 0   • metoprolol succinate XL (TOPROL-XL) 25 MG 24 hr tablet TK 1 T PO QAM  5   • montelukast (SINGULAIR) 10 MG tablet TAKE 1 TABLET BY MOUTH EVERY DAY 90 tablet 1   • Multiple Vitamin (MULTI VITAMIN DAILY PO) Take 1 tablet by mouth Daily.     • pantoprazole (PROTONIX) 40 MG EC tablet Take 1 tablet by mouth every morning 30 minutes before breakfast 90 tablet 1   • valACYclovir (VALTREX) 1000 MG tablet TK 2 TS PO Q 12HOURS FOR 1 DAY UTD  2   • vitamin D (ERGOCALCIFEROL) 22547 units capsule capsule TAKE 1 CAPSULE BY MOUTH 1 TIME EVERY WEEK 12 capsule 0       Review of  Systems   Constitutional: Positive for chills and fatigue. Negative for fever.   Respiratory: Negative for cough, chest tightness, shortness of breath and wheezing.    Cardiovascular: Positive for chest pain. Negative for palpitations and leg swelling.   Gastrointestinal: Positive for abdominal pain (gerd). Negative for constipation, diarrhea, nausea and vomiting.   Neurological: Negative for headaches.       Objective   Vitals:    04/13/18 0905   BP: 112/68   Pulse: 85   Resp: 16   Temp: 97.8 °F (36.6 °C)   SpO2: 98%      1    04/13/18 0905   Weight: 70.9 kg (156 lb 6.4 oz)    [unfilled]  Body mass index is 23.09 kg/m².      Physical Exam   Constitutional: He appears well-developed and well-nourished. No distress.   HENT:   Head: Normocephalic and atraumatic.   Neck: Carotid bruit is not present. No thyromegaly present.   Cardiovascular: Normal rate, regular rhythm, normal heart sounds and intact distal pulses.  Exam reveals no gallop.    No murmur heard.  Pulmonary/Chest: Effort normal and breath sounds normal. No respiratory distress. He has no wheezes. He has no rales.   Abdominal: Soft. Bowel sounds are normal. He exhibits no mass. There is no tenderness. There is no guarding.         Problem List Items Addressed This Visit        Cardiovascular and Mediastinum    Hypertension - Primary (Chronic)    Paroxysmal atrial fibrillation    Hyperlipidemia    Coronary artery disease involving native coronary artery of native heart       Respiratory    Obstructive sleep apnea syndrome (Chronic)       Digestive    Gastroesophageal reflux disease (Chronic)      Other Visit Diagnoses    None.       Assessment/Plan   In for recheck of multiple medical problems.  Hypertension, hyperlipidemia, CAD, PAF.  History of stroke and hypercoagulable state.  Blood pressure control is excellent.  Lipids are pending.  No arrhythmias.  Some chest pressure or awareness in the chest with walking around the house or upstairs but not  with his exercise program.  Had annual lab work August 2017.  Gets lipids every 3 months.  Urologist checks his PSA and prostate exam.  He's got a variety of complaints today.  He's had some problems with body odor.  Jock itch on and off.  Feverish at times or at least having hot chills.  Chronic fatigue.  Stools have a foul odor.  Intermittent folliculitis.  Just saw a dermatologist 3 days ago.  Was given some erythromycin cleared up his folliculitis.  Told to take just 2 days of clotrimazole cream but he took it for a week.  Also a benzyl peroxide wash to the affected areas.  He has concerns about the potential for systemic fungal illness.  Concerns about low T which he has had in the past.  He knows he has sleep apnea.  Advised patient that these topical skin conditions or not systemic.  He does not at all look cushingoid.  He needs to see his allergist again to make sure that he had immune testing done.  We'll also repeat sleep study to make sure his oral devices working properly.  It was never studied.         Tricia disclaimer:   Much of this encounter note is an electronic transcription/translation of spoken language to printed text. The electronic translation of spoken language may permit erroneous, or at times, nonsensical words or phrases to be inadvertently transcribed; Although I have reviewed the note for such errors, some may still exist.

## 2018-04-21 DIAGNOSIS — J30.1 ALLERGIC RHINITIS DUE TO POLLEN: ICD-10-CM

## 2018-04-23 RX ORDER — LEVOCETIRIZINE DIHYDROCHLORIDE 5 MG/1
TABLET, FILM COATED ORAL
Qty: 90 TABLET | Refills: 0 | Status: SHIPPED | OUTPATIENT
Start: 2018-04-23 | End: 2018-07-19 | Stop reason: SDUPTHER

## 2018-05-24 DIAGNOSIS — E78.5 HYPERLIPIDEMIA: ICD-10-CM

## 2018-05-24 RX ORDER — FOLIC ACID 1 MG/1
TABLET ORAL
Qty: 90 TABLET | Refills: 0 | Status: SHIPPED | OUTPATIENT
Start: 2018-05-24 | End: 2018-08-22 | Stop reason: SDUPTHER

## 2018-05-30 DIAGNOSIS — K21.9 GASTROESOPHAGEAL REFLUX DISEASE, ESOPHAGITIS PRESENCE NOT SPECIFIED: ICD-10-CM

## 2018-05-30 RX ORDER — ERGOCALCIFEROL 1.25 MG/1
CAPSULE ORAL
Qty: 12 CAPSULE | Refills: 0 | Status: SHIPPED | OUTPATIENT
Start: 2018-05-30 | End: 2018-08-15 | Stop reason: SDUPTHER

## 2018-05-30 RX ORDER — PANTOPRAZOLE SODIUM 40 MG/1
TABLET, DELAYED RELEASE ORAL
Qty: 90 TABLET | Refills: 0 | Status: SHIPPED | OUTPATIENT
Start: 2018-05-30 | End: 2018-08-26 | Stop reason: SDUPTHER

## 2018-06-05 RX ORDER — ATORVASTATIN CALCIUM 40 MG/1
TABLET, FILM COATED ORAL
Qty: 90 TABLET | Refills: 0 | Status: SHIPPED | OUTPATIENT
Start: 2018-06-05 | End: 2018-09-20 | Stop reason: SDUPTHER

## 2018-07-19 ENCOUNTER — OFFICE VISIT (OUTPATIENT)
Dept: INTERNAL MEDICINE | Facility: CLINIC | Age: 63
End: 2018-07-19

## 2018-07-19 ENCOUNTER — APPOINTMENT (OUTPATIENT)
Dept: LAB | Facility: HOSPITAL | Age: 63
End: 2018-07-19

## 2018-07-19 VITALS
HEART RATE: 45 BPM | TEMPERATURE: 97.8 F | BODY MASS INDEX: 21.92 KG/M2 | WEIGHT: 148 LBS | OXYGEN SATURATION: 98 % | DIASTOLIC BLOOD PRESSURE: 48 MMHG | HEIGHT: 69 IN | SYSTOLIC BLOOD PRESSURE: 82 MMHG | RESPIRATION RATE: 16 BRPM

## 2018-07-19 DIAGNOSIS — I48.0 PAROXYSMAL ATRIAL FIBRILLATION (HCC): ICD-10-CM

## 2018-07-19 DIAGNOSIS — E78.5 HYPERLIPIDEMIA, UNSPECIFIED HYPERLIPIDEMIA TYPE: ICD-10-CM

## 2018-07-19 DIAGNOSIS — K21.9 GASTROESOPHAGEAL REFLUX DISEASE WITHOUT ESOPHAGITIS: Chronic | ICD-10-CM

## 2018-07-19 DIAGNOSIS — R53.83 FATIGUE, UNSPECIFIED TYPE: ICD-10-CM

## 2018-07-19 DIAGNOSIS — I25.10 CORONARY ARTERY DISEASE INVOLVING NATIVE CORONARY ARTERY OF NATIVE HEART WITHOUT ANGINA PECTORIS: ICD-10-CM

## 2018-07-19 DIAGNOSIS — I10 ESSENTIAL HYPERTENSION: Primary | Chronic | ICD-10-CM

## 2018-07-19 LAB
ANION GAP SERPL CALCULATED.3IONS-SCNC: 9 MMOL/L
BASOPHILS # BLD AUTO: 0.03 10*3/MM3 (ref 0–0.2)
BASOPHILS NFR BLD AUTO: 0.6 % (ref 0–1.5)
BUN BLD-MCNC: 20 MG/DL (ref 8–23)
BUN/CREAT SERPL: 20.2 (ref 7–25)
CALCIUM SPEC-SCNC: 9.6 MG/DL (ref 8.6–10.5)
CHLORIDE SERPL-SCNC: 104 MMOL/L (ref 98–107)
CHOLEST SERPL-MCNC: 134 MG/DL (ref 0–200)
CO2 SERPL-SCNC: 29 MMOL/L (ref 22–29)
CREAT BLD-MCNC: 0.99 MG/DL (ref 0.76–1.27)
DEPRECATED RDW RBC AUTO: 49.2 FL (ref 37–54)
EOSINOPHIL # BLD AUTO: 0.15 10*3/MM3 (ref 0–0.7)
EOSINOPHIL NFR BLD AUTO: 3.2 % (ref 0.3–6.2)
ERYTHROCYTE [DISTWIDTH] IN BLOOD BY AUTOMATED COUNT: 13.6 % (ref 11.5–14.5)
GFR SERPL CREATININE-BSD FRML MDRD: 76 ML/MIN/1.73
GLUCOSE BLD-MCNC: 95 MG/DL (ref 65–99)
HCT VFR BLD AUTO: 42.8 % (ref 40.4–52.2)
HDLC SERPL-MCNC: 62 MG/DL (ref 40–60)
HGB BLD-MCNC: 13.9 G/DL (ref 13.7–17.6)
IMM GRANULOCYTES # BLD: 0 10*3/MM3 (ref 0–0.03)
IMM GRANULOCYTES NFR BLD: 0 % (ref 0–0.5)
LDLC SERPL CALC-MCNC: 60 MG/DL (ref 0–100)
LDLC/HDLC SERPL: 0.97 {RATIO}
LYMPHOCYTES # BLD AUTO: 1.15 10*3/MM3 (ref 0.9–4.8)
LYMPHOCYTES NFR BLD AUTO: 24.9 % (ref 19.6–45.3)
MCH RBC QN AUTO: 32 PG (ref 27–32.7)
MCHC RBC AUTO-ENTMCNC: 32.5 G/DL (ref 32.6–36.4)
MCV RBC AUTO: 98.6 FL (ref 79.8–96.2)
MONOCYTES # BLD AUTO: 0.6 10*3/MM3 (ref 0.2–1.2)
MONOCYTES NFR BLD AUTO: 13 % (ref 5–12)
NEUTROPHILS # BLD AUTO: 2.69 10*3/MM3 (ref 1.9–8.1)
NEUTROPHILS NFR BLD AUTO: 58.3 % (ref 42.7–76)
PLATELET # BLD AUTO: 169 10*3/MM3 (ref 140–500)
PMV BLD AUTO: 10.6 FL (ref 6–12)
POTASSIUM BLD-SCNC: 4.4 MMOL/L (ref 3.5–5.2)
RBC # BLD AUTO: 4.34 10*6/MM3 (ref 4.6–6)
SODIUM BLD-SCNC: 142 MMOL/L (ref 136–145)
TRIGL SERPL-MCNC: 59 MG/DL (ref 0–150)
TSH SERPL DL<=0.05 MIU/L-ACNC: 3.54 MIU/ML (ref 0.27–4.2)
VLDLC SERPL-MCNC: 11.8 MG/DL (ref 5–40)
WBC NRBC COR # BLD: 4.62 10*3/MM3 (ref 4.5–10.7)

## 2018-07-19 PROCEDURE — 36415 COLL VENOUS BLD VENIPUNCTURE: CPT | Performed by: INTERNAL MEDICINE

## 2018-07-19 PROCEDURE — 80061 LIPID PANEL: CPT | Performed by: INTERNAL MEDICINE

## 2018-07-19 PROCEDURE — 93000 ELECTROCARDIOGRAM COMPLETE: CPT | Performed by: INTERNAL MEDICINE

## 2018-07-19 PROCEDURE — 80048 BASIC METABOLIC PNL TOTAL CA: CPT | Performed by: INTERNAL MEDICINE

## 2018-07-19 PROCEDURE — 84443 ASSAY THYROID STIM HORMONE: CPT | Performed by: INTERNAL MEDICINE

## 2018-07-19 PROCEDURE — 99214 OFFICE O/P EST MOD 30 MIN: CPT | Performed by: INTERNAL MEDICINE

## 2018-07-19 PROCEDURE — 85025 COMPLETE CBC W/AUTO DIFF WBC: CPT | Performed by: INTERNAL MEDICINE

## 2018-07-19 NOTE — PATIENT INSTRUCTIONS
Tdap Vaccine (Tetanus, Diphtheria and Pertussis): What You Need to Know  1. Why get vaccinated?  Tetanus, diphtheria and pertussis are very serious diseases. Tdap vaccine can protect us from these diseases. And, Tdap vaccine given to pregnant women can protect  babies against pertussis.  TETANUS (Lockjaw) is rare in the [place] today. It causes painful muscle tightening and stiffness, usually all over the body.  · It can lead to tightening of muscles in the head and neck so you can't open your mouth, swallow, or sometimes even breathe. Tetanus kills about 1 out of 10 people who are infected even after receiving the best medical care.    DIPHTHERIA is also rare in the [place] today. It can cause a thick coating to form in the back of the throat.  · It can lead to breathing problems, heart failure, paralysis, and death.    PERTUSSIS (Whooping Cough) causes severe coughing spells, which can cause difficulty breathing, vomiting and disturbed sleep.  · It can also lead to weight loss, incontinence, and rib fractures. Up to 2 in 100 adolescents and 5 in 100 adults with pertussis are hospitalized or have complications, which could include pneumonia or death.    These diseases are caused by bacteria. Diphtheria and pertussis are spread from person to person through secretions from coughing or sneezing. Tetanus enters the body through cuts, scratches, or wounds.  Before vaccines, as many as 200,000 cases of diphtheria, 200,000 cases of pertussis, and hundreds of cases of tetanus, were reported in the [place] each year. Since vaccination began, reports of cases for tetanus and diphtheria have dropped by about 99% and for pertussis by about 80%.  2. Tdap vaccine  Tdap vaccine can protect adolescents and adults from tetanus, diphtheria, and pertussis. One dose of Tdap is routinely given at age 11 or 12. People who did not get Tdap at that age should get it as soon as possible.  Tdap is especially  important for healthcare professionals and anyone having close contact with a baby younger than 12 months.  Pregnant women should get a dose of Tdap during every pregnancy, to protect the  from pertussis. Infants are most at risk for severe, life-threatening complications from pertussis.  Another vaccine, called Td, protects against tetanus and diphtheria, but not pertussis. A Td booster should be given every 10 years. Tdap may be given as one of these boosters if you have never gotten Tdap before. Tdap may also be given after a severe cut or burn to prevent tetanus infection.  Your doctor or the person giving you the vaccine can give you more information.  Tdap may safely be given at the same time as other vaccines.  3. Some people should not get this vaccine  · A person who has ever had a life-threatening allergic reaction after a previous dose of any diphtheria, tetanus or pertussis containing vaccine, OR has a severe allergy to any part of this vaccine, should not get Tdap vaccine. Tell the person giving the vaccine about any severe allergies.  · Anyone who had coma or long repeated seizures within 7 days after a childhood dose of DTP or DTaP, or a previous dose of Tdap, should not get Tdap, unless a cause other than the vaccine was found. They can still get Td.  · Talk to your doctor if you:  ? have seizures or another nervous system problem,  ? had severe pain or swelling after any vaccine containing diphtheria, tetanus or pertussis,  ? ever had a condition called Guillain-Barré Syndrome (GBS),  ? aren't feeling well on the day the shot is scheduled.  4. Risks  With any medicine, including vaccines, there is a chance of side effects. These are usually mild and go away on their own. Serious reactions are also possible but are rare.  Most people who get Tdap vaccine do not have any problems with it.  Mild problems following Tdap:  (Did not interfere with activities)  · Pain where the shot was given (about  3 in 4 adolescents or 2 in 3 adults)  · Redness or swelling where the shot was given (about 1 person in 5)  · Mild fever of at least 100.4°F (up to about 1 in 25 adolescents or 1 in 100 adults)  · Headache (about 3 or 4 people in 10)  · Tiredness (about 1 person in 3 or 4)  · Nausea, vomiting, diarrhea, stomach ache (up to 1 in 4 adolescents or 1 in 10 adults)  · Chills, sore joints (about 1 person in 10)  · Body aches (about 1 person in 3 or 4)  · Rash, swollen glands (uncommon)    Moderate problems following Tdap:  (Interfered with activities, but did not require medical attention)  · Pain where the shot was given (up to 1 in 5 or 6)  · Redness or swelling where the shot was given (up to about 1 in 16 adolescents or 1 in 12 adults)  · Fever over 102°F (about 1 in 100 adolescents or 1 in 250 adults)  · Headache (about 1 in 7 adolescents or 1 in 10 adults)  · Nausea, vomiting, diarrhea, stomach ache (up to 1 or 3 people in 100)  · Swelling of the entire arm where the shot was given (up to about 1 in 500).    Severe problems following Tdap:  (Unable to perform usual activities; required medical attention)  · Swelling, severe pain, bleeding and redness in the arm where the shot was given (rare).    Problems that could happen after any vaccine:  · People sometimes faint after a medical procedure, including vaccination. Sitting or lying down for about 15 minutes can help prevent fainting, and injuries caused by a fall. Tell your doctor if you feel dizzy, or have vision changes or ringing in the ears.  · Some people get severe pain in the shoulder and have difficulty moving the arm where a shot was given. This happens very rarely.  · Any medication can cause a severe allergic reaction. Such reactions from a vaccine are very rare, estimated at fewer than 1 in a million doses, and would happen within a few minutes to a few hours after the vaccination.  As with any medicine, there is a very remote chance of a vaccine  causing a serious injury or death.  The safety of vaccines is always being monitored. For more information, visit: www.cdc.gov/vaccinesafety/  5. What if there is a serious problem?  What should I look for?  Look for anything that concerns you, such as signs of a severe allergic reaction, very high fever, or unusual behavior.  Signs of a severe allergic reaction can include hives, swelling of the face and throat, difficulty breathing, a fast heartbeat, dizziness, and weakness. These would usually start a few minutes to a few hours after the vaccination.  What should I do?  · If you think it is a severe allergic reaction or other emergency that can’t wait, call 9-1-1 or get the person to the nearest hospital. Otherwise, call your doctor.  · Afterward, the reaction should be reported to the Vaccine Adverse Event Reporting System (VAERS). Your doctor might file this report, or you can do it yourself through the VAERS web site at www.vaers.hhs.gov, or by calling 1-597.243.8300.  ? VAERS does not give medical advice.  6. The National Vaccine Injury Compensation Program  The National Vaccine Injury Compensation Program (VICP) is a federal program that was created to compensate people who may have been injured by certain vaccines.  Persons who believe they may have been injured by a vaccine can learn about the program and about filing a claim by calling 1-302.459.9438 or visiting the VICP website at www.hrsa.gov/vaccinecompensation. There is a time limit to file a claim for compensation.  7. How can I learn more?  · Ask your doctor. He or she can give you the vaccine package insert or suggest other sources of information.  · Call your local or state health department.  · Contact the Centers for Disease Control and Prevention (CDC):  ? Call 1-276.690.2937 (3-884-PZO-INFO) or  ? Visit CDC’s website at www.cdc.gov/vaccines  CDC Tdap Vaccine VIS (2/24/15)  This information is not intended to replace advice given to you by your  health care provider. Make sure you discuss any questions you have with your health care provider.  Document Released: 06/18/2013 Document Revised: 09/07/2017 Document Reviewed: 09/07/2017  Elsevier Interactive Patient Education © 2017 [employer] Inc.

## 2018-07-19 NOTE — PROGRESS NOTES
Subjective   Brian Barriga is a 63 y.o. male.     Chief Complaint   Patient presents with   • Hypertension     CAD, PAF   • Hyperlipidemia     pt c/o fatigue & pain         Hypertension   This is a chronic problem. The current episode started more than 1 year ago. The problem is unchanged. The problem is controlled. Associated symptoms include chest pain and malaise/fatigue. Pertinent negatives include no headaches, palpitations, peripheral edema or shortness of breath. There are no associated agents to hypertension. Risk factors for coronary artery disease include dyslipidemia and male gender. Current antihypertension treatment includes beta blockers. The current treatment provides significant improvement. There are no compliance problems.  Hypertensive end-organ damage includes CAD/MI and CVA. There is no history of angina or kidney disease.   Hyperlipidemia   This is a chronic problem. The current episode started more than 1 year ago. The problem is controlled. Recent lipid tests were reviewed and are normal. Factors aggravating his hyperlipidemia include beta blockers. Associated symptoms include chest pain. Pertinent negatives include no shortness of breath. Current antihyperlipidemic treatment includes statins. The current treatment provides significant improvement of lipids. There are no compliance problems.  Risk factors for coronary artery disease include dyslipidemia, hypertension and male sex.   Hyperglycemia   This is a chronic problem. The current episode started more than 1 month ago. Associated symptoms include abdominal pain (gerd), chest pain, chills and fatigue. Pertinent negatives include no coughing, fever, headaches, nausea or vomiting.   Coronary Artery Disease   Presents for follow-up visit. Symptoms include chest pain. Pertinent negatives include no chest pressure, chest tightness, leg swelling, palpitations or shortness of breath. Risk factors include hyperlipidemia. The symptoms have been  resolved. Compliance with diet is good. Compliance with exercise is good. Compliance with medications is good.   Atrial Fibrillation   Presents for follow-up visit. Symptoms include chest pain. Symptoms are negative for palpitations and shortness of breath. The symptoms have been stable. Past medical history includes atrial fibrillation, CAD and hyperlipidemia. There are no medication compliance problems.        The following portions of the patient's history were reviewed and updated as appropriate: allergies, current medications, past social history and problem list.    Outpatient Prescriptions Marked as Taking for the 7/19/18 encounter (Office Visit) with Robert Light MD   Medication Sig Dispense Refill   • aspirin 81 MG EC tablet Take 81 mg by mouth Daily.     • atorvastatin (LIPITOR) 40 MG tablet TAKE 1 TABLET BY MOUTH EVERY MORNING 90 tablet 0   • BREO ELLIPTA 100-25 MCG/INH aerosol powder  INL 1 PUFF PO QD  5   • clopidogrel (PLAVIX) 75 MG tablet TAKE 1 TABLET BY MOUTH EVERY DAY 90 tablet 0   • clotrimazole-betamethasone (LOTRISONE) 1-0.05 % cream Apply  topically Every 12 (Twelve) Hours. 15 g 0   • erythromycin with ethanol (EMGEL) 2 % gel   0   • finasteride (PROSCAR) 5 MG tablet Take 1 tablet by mouth daily.     • folic acid (FOLVITE) 1 MG tablet TAKE 1 TABLET BY MOUTH DAILY 90 tablet 0   • levocetirizine (XYZAL) 5 MG tablet TAKE 1 TABLET BY MOUTH EVERY EVENING 90 tablet 0   • metoprolol succinate XL (TOPROL-XL) 25 MG 24 hr tablet TK 1 T PO QAM  5   • montelukast (SINGULAIR) 10 MG tablet TAKE 1 TABLET BY MOUTH EVERY DAY 90 tablet 1   • Multiple Vitamin (MULTI VITAMIN DAILY PO) Take 1 tablet by mouth Daily.     • pantoprazole (PROTONIX) 40 MG EC tablet TAKE 1 TABLET BY MOUTH EVERY MORNING 30 MINUTES BEFORE BREAKFAST 90 tablet 0   • valACYclovir (VALTREX) 1000 MG tablet TK 2 TS PO Q 12HOURS FOR 1 DAY UTD  2   • vitamin D (ERGOCALCIFEROL) 18214 units capsule capsule TAKE 1 CAPSULE BY MOUTH 1 TIME EVERY WEEK  12 capsule 0       Review of Systems   Constitutional: Positive for chills, fatigue and malaise/fatigue. Negative for fever.   Respiratory: Negative for cough, chest tightness, shortness of breath and wheezing.    Cardiovascular: Positive for chest pain. Negative for palpitations and leg swelling.   Gastrointestinal: Positive for abdominal pain (gerd). Negative for constipation, diarrhea, nausea and vomiting.   Neurological: Negative for headaches.       Objective   Vitals:    07/19/18 0821   BP: (!) 82/48   Pulse: (!) 45   Resp: 16   Temp: 97.8 °F (36.6 °C)   SpO2: 98%      1    07/19/18 0821   Weight: 67.1 kg (148 lb)    [unfilled]  Body mass index is 21.85 kg/m².      Physical Exam   Constitutional: He appears well-developed and well-nourished. No distress.   HENT:   Head: Normocephalic and atraumatic.   Neck: Carotid bruit is not present. No thyromegaly present.   Cardiovascular: Normal rate, regular rhythm, normal heart sounds and intact distal pulses.  Exam reveals no gallop.    No murmur heard.  Pulmonary/Chest: Effort normal and breath sounds normal. No respiratory distress. He has no wheezes. He has no rales.   Abdominal: Soft. Bowel sounds are normal. He exhibits no mass. There is no tenderness. There is no guarding.         Problem List Items Addressed This Visit        Cardiovascular and Mediastinum    Hypertension - Primary (Chronic)    Paroxysmal atrial fibrillation (CMS/HCC)    Hyperlipidemia    Coronary artery disease involving native coronary artery of native heart       Digestive    Gastroesophageal reflux disease (Chronic)      Other Visit Diagnoses     Fatigue, unspecified type            Assessment/Plan   In for recheck of multiple medical problems.  Hypertension, hyperlipidemia, CAD, PAF.  History of stroke and hypercoagulable state.  Blood pressure control is excellent.  Lipids are pending.  No arrhythmias.   Had annual lab work August 2017.  Gets lipids every 3 months.  Urologist checks  his PSA and prostate exam.  Said that terrible fatigue over the last 24 hours or so.  Fairly sudden onset.  Blood pressure pulse are low.  We'll check an EKG, CBC, and TSH today.  Lipids and BMP also.  We'll get stat labs.  Hold metoprolol.  Further recommendations to follow.  Lab work later came back normal.  EKG was okay other than bradycardia.  TSH is pending.  For now he'll hold his beta blocker.  Hydrate.  Wait on TSH.  Recheck pulse and blood pressure..  I'll see him sooner if he feels worse.  His pulse and blood pressure still on next visit we'll check a Holter and consider a TTE.      ECG 12 Lead  Date/Time: 7/19/2018 5:48 PM  Performed by: RAFAL ALONSO  Authorized by: RAFAL ALONSO   Comparison: compared with previous ECG from 2/11/2016  Rhythm: sinus bradycardia  Rate: bradycardic  Conduction: conduction normal  ST Segments: ST segments normal  T Waves: T waves normal  QRS axis: normal  Other: no other findings  Clinical impression: abnormal ECG  Comments: Severe sinus bradycardia.  Heart rate is 39.  This is otherwise normal EKG.  This is compared to a prior EKG dated 2/11/2016.  The prior EKG was erroneous and had abnormal lead placements.                     Dragon disclaimer:   Much of this encounter note is an electronic transcription/translation of spoken language to printed text. The electronic translation of spoken language may permit erroneous, or at times, nonsensical words or phrases to be inadvertently transcribed; Although I have reviewed the note for such errors, some may still exist.

## 2018-07-21 DIAGNOSIS — J30.1 ALLERGIC RHINITIS DUE TO POLLEN: ICD-10-CM

## 2018-07-23 RX ORDER — LEVOCETIRIZINE DIHYDROCHLORIDE 5 MG/1
TABLET, FILM COATED ORAL
Qty: 90 TABLET | Refills: 0 | Status: SHIPPED | OUTPATIENT
Start: 2018-07-23 | End: 2019-04-17 | Stop reason: SDUPTHER

## 2018-07-25 ENCOUNTER — OFFICE VISIT (OUTPATIENT)
Dept: INTERNAL MEDICINE | Facility: CLINIC | Age: 63
End: 2018-07-25

## 2018-07-25 ENCOUNTER — OFFICE VISIT (OUTPATIENT)
Dept: SLEEP MEDICINE | Facility: HOSPITAL | Age: 63
End: 2018-07-25
Attending: INTERNAL MEDICINE

## 2018-07-25 VITALS
HEART RATE: 56 BPM | SYSTOLIC BLOOD PRESSURE: 114 MMHG | WEIGHT: 151 LBS | HEIGHT: 69 IN | DIASTOLIC BLOOD PRESSURE: 63 MMHG | BODY MASS INDEX: 22.36 KG/M2

## 2018-07-25 VITALS
OXYGEN SATURATION: 92 % | BODY MASS INDEX: 22.39 KG/M2 | DIASTOLIC BLOOD PRESSURE: 60 MMHG | SYSTOLIC BLOOD PRESSURE: 104 MMHG | HEART RATE: 76 BPM | HEIGHT: 69 IN | RESPIRATION RATE: 16 BRPM | TEMPERATURE: 97.8 F | WEIGHT: 151.2 LBS

## 2018-07-25 DIAGNOSIS — R53.83 FATIGUE, UNSPECIFIED TYPE: Primary | ICD-10-CM

## 2018-07-25 DIAGNOSIS — M54.50 ACUTE BILATERAL LOW BACK PAIN WITHOUT SCIATICA: ICD-10-CM

## 2018-07-25 DIAGNOSIS — G47.33 OBSTRUCTIVE SLEEP APNEA SYNDROME: Chronic | ICD-10-CM

## 2018-07-25 LAB
BILIRUB BLD-MCNC: ABNORMAL MG/DL
CLARITY, POC: CLEAR
COLOR UR: YELLOW
GLUCOSE UR STRIP-MCNC: NEGATIVE MG/DL
KETONES UR QL: NEGATIVE
LEUKOCYTE EST, POC: NEGATIVE
NITRITE UR-MCNC: NEGATIVE MG/ML
PH UR: 7 [PH] (ref 5–8)
PROT UR STRIP-MCNC: ABNORMAL MG/DL
RBC # UR STRIP: NEGATIVE /UL
SP GR UR: 1.01 (ref 1–1.03)
UROBILINOGEN UR QL: NORMAL

## 2018-07-25 PROCEDURE — 90471 IMMUNIZATION ADMIN: CPT | Performed by: INTERNAL MEDICINE

## 2018-07-25 PROCEDURE — 81003 URINALYSIS AUTO W/O SCOPE: CPT | Performed by: INTERNAL MEDICINE

## 2018-07-25 PROCEDURE — 90715 TDAP VACCINE 7 YRS/> IM: CPT | Performed by: INTERNAL MEDICINE

## 2018-07-25 PROCEDURE — G0463 HOSPITAL OUTPT CLINIC VISIT: HCPCS

## 2018-07-25 PROCEDURE — 99244 OFF/OP CNSLTJ NEW/EST MOD 40: CPT | Performed by: INTERNAL MEDICINE

## 2018-07-25 PROCEDURE — 99213 OFFICE O/P EST LOW 20 MIN: CPT | Performed by: INTERNAL MEDICINE

## 2018-07-25 NOTE — PROGRESS NOTES
Subjective   Brian Barriga is a 63 y.o. male.     Chief Complaint   Patient presents with   • Fatigue     recheck         In with recheck of profound fatigue.  This began fairly suddenly one week ago.  Heart rate was down to 39.  Blood pressure was low that day.  Lab work was excellent.  He still having fatigue but not as bad as it was that day.  He's having no specific exertional symptoms.  Fatigue is no better nor worse with exercise.  Has some ache in the lumbar region.  Some burning sensations in the left flank with movement on and off.      Fatigue   Associated symptoms include fatigue. Pertinent negatives include no chest pain.        The following portions of the patient's history were reviewed and updated as appropriate: allergies, current medications, past social history and problem list.    Outpatient Prescriptions Marked as Taking for the 7/25/18 encounter (Office Visit) with Robert Light MD   Medication Sig Dispense Refill   • aspirin 81 MG EC tablet Take 81 mg by mouth Daily.     • atorvastatin (LIPITOR) 40 MG tablet TAKE 1 TABLET BY MOUTH EVERY MORNING 90 tablet 0   • clopidogrel (PLAVIX) 75 MG tablet TAKE 1 TABLET BY MOUTH EVERY DAY 90 tablet 0   • erythromycin with ethanol (EMGEL) 2 % gel   0   • finasteride (PROSCAR) 5 MG tablet Take 1 tablet by mouth daily.     • folic acid (FOLVITE) 1 MG tablet TAKE 1 TABLET BY MOUTH DAILY 90 tablet 0   • levocetirizine (XYZAL) 5 MG tablet TAKE 1 TABLET BY MOUTH EVERY EVENING 90 tablet 0   • montelukast (SINGULAIR) 10 MG tablet TAKE 1 TABLET BY MOUTH EVERY DAY 90 tablet 1   • Multiple Vitamin (MULTI VITAMIN DAILY PO) Take 1 tablet by mouth Daily.     • pantoprazole (PROTONIX) 40 MG EC tablet TAKE 1 TABLET BY MOUTH EVERY MORNING 30 MINUTES BEFORE BREAKFAST 90 tablet 0   • valACYclovir (VALTREX) 1000 MG tablet TK 2 TS PO Q 12HOURS FOR 1 DAY UTD  2   • vitamin D (ERGOCALCIFEROL) 76599 units capsule capsule TAKE 1 CAPSULE BY MOUTH 1 TIME EVERY WEEK 12 capsule 0    • [DISCONTINUED] metoprolol succinate XL (TOPROL-XL) 25 MG 24 hr tablet TK 1 T PO QAM  5       Review of Systems   Constitutional: Positive for fatigue.   Respiratory: Negative for chest tightness, shortness of breath and wheezing.    Cardiovascular: Negative for chest pain and palpitations.       Objective   Vitals:    07/25/18 0844   BP: 104/60   Pulse: 76   Resp: 16   Temp: 97.8 °F (36.6 °C)   SpO2: 92%      1    07/25/18  0844   Weight: 68.6 kg (151 lb 3.2 oz)    [unfilled]  Body mass index is 22.32 kg/m².      Physical Exam   Constitutional: He appears well-developed and well-nourished. No distress.   HENT:   Head: Normocephalic and atraumatic.   Neck: Carotid bruit is not present.   Cardiovascular: Normal rate, regular rhythm, normal heart sounds and intact distal pulses.  Exam reveals no gallop.    No murmur heard.  Pulmonary/Chest: Effort normal and breath sounds normal. No respiratory distress. He has no wheezes. He has no rales.   Abdominal: Soft. Bowel sounds are normal. He exhibits no mass. There is no tenderness. There is no guarding.         Problem List Items Addressed This Visit     None      Visit Diagnoses     Fatigue, unspecified type    -  Primary    Acute bilateral low back pain without sciatica            Assessment/Plan   In with fatigue.  Was in 1 week ago with 24 hours of profound fatigue.  He is a little better but is persistent.  Lab work was normal.  He was quite bradycardic and somewhat hypotensive at his last visit.  That is resolved off of metoprolol.  He's developed some low backache.  That is nonspecific but seems to be musculoskeletal.  He has a distant history of compression fracture following trauma.  He plans to recheck with his chiropractor on the back.  We'll check urinalysis today.  He's having a sleep study later today.  The next best test for his fatigue is going to be a 24-hour Holter.  We'll set that up.           .rogerfollowup  Dragon disclaimer:   Much of this  encounter note is an electronic transcription/translation of spoken language to printed text. The electronic translation of spoken language may permit erroneous, or at times, nonsensical words or phrases to be inadvertently transcribed; Although I have reviewed the note for such errors, some may still exist.

## 2018-07-25 NOTE — PROGRESS NOTES
"[facility]Consultation       Reason for Consultation: ANNMARIE    Patient Care Team:  Robert Light MD as PCP - Internal Medicine (Internal Medicine)  Darrell Mosqueda MD as Consulting Physician (Cardiology)  Pete Reis MD as Consulting Physician (Allergy and Immunology)  Biran Nolan MD as Consulting Physician (Sleep Medicine)    Chief complaint:  ANNMARIE    History of present illness:  Thank you for asking me to see your patient.  The patient is a 63 y.o. male who has a history of ANNMARIE as defined by the apnea plus hypopnea plus respiratory effort-related arousal.  His index was 21 per hour.  The patient's been treated with an oral appliance.  He needs repair to the oral appliance.    He goes to bed between 9 and 10 PM and awakens between 6 and 7 AM.  Sometimes he is tired.  [place] Sleepiness Scale is borderline abnormal at 9.  He has no bed partner and he does not know if he snores.  Occasionally he has awaking gasping for breath.  Sometimes he sweats excessively.  He grinds his teeth.  He has difficulty staying asleep with frequent awakenings.  His sleep is generally restless.  He will get up twice to use the restroom.    Review of Systems:  Recorded on the Sleep Questionnaire.  Unremarkable except for painful joints and some CEASR and he always feels too cold    History:  Past Medical History:   Diagnosis Date   • Ankle swelling    ,   Past Surgical History:   Procedure Laterality Date   • BRAIN SURGERY      2011-- Says \"Stents\"  put in brain--Dr Wong.   • CARDIAC CATHETERIZATION     • CHOLECYSTECTOMY     • COLONOSCOPY      Complete   • COLONOSCOPY      estimated date- 2014 with diverticulosis   • ENDOSCOPY N/A 5/13/2016    Procedure: ESOPHAGOGASTRODUODENOSCOPY with biopsies;  Surgeon: Blane Ramos MD;  Location: [facility] ENDOSCOPY;  Service:    • UPPER ENDOSCOPIC ULTRASOUND W/ FNA  01/29/2014    unclear if FNA performed    ,   Family History   Problem Relation Age of Onset   • No " "Known Problems Mother         n/a   • No Known Problems Father         n/a   • No Known Problems Son    • No Known Problems Son     and   Social History   Substance Use Topics   • Smoking status: Never Smoker   • Smokeless tobacco: Never Used   • Alcohol use No       Social History:  He is a retired     Allergies:  Patient has no known allergies.     Medication Review: I reviewed his medicine list    Vital Signs:    Vitals:    07/25/18 1034   BP: 114/63   Pulse: 56   Weight: 68.5 kg (151 lb)   Height: 175.3 cm (69\")      Body mass index is 22.3 kg/m².    Physical Exam:  [facility]ed on [facility] Physical Exam Form and is unremarkable except for:  A normal sized tongue and uvula, patent posterior pharyngeal opening, and class III MP airway     Results Review:  I reviewed his old sleep study with him.       Impression:   History of moderate severity obstructive sleep apnea defined by the apnea plus hypopnea plus respiratory effort-related arousal index.  The patient has been treated with an oral appliance.  The patient still has some complaints worrisome for obstructive sleep apnea?    Plan:  Good sleep hygiene measures should be maintained.      Pathophysiology of ANNMARIE described to the patient.  Cardiovascular complications of untreated ANNMARIE also reviewed.      At this time, I do not need a study to treat his ANNMARIE.  Several names were given for him to follow-up with to see if he can fix his own device or if he will need a new oral appliance.  If he needs a new study to get a new device, he will call me.  If he gets a new oral appliance or he repairs his old appliance, he will call me to get a follow-up home sleep study, treated, to prove efficacy of his oral appliance.     Thank you for requesting me to assist in this patient's care.    Brian Nolan MD  Sleep Medicine  07/28/18  10:43 AM          "

## 2018-07-25 NOTE — PATIENT INSTRUCTIONS
Tdap Vaccine (Tetanus, Diphtheria and Pertussis): What You Need to Know  1. Why get vaccinated?  Tetanus, diphtheria and pertussis are very serious diseases. Tdap vaccine can protect us from these diseases. And, Tdap vaccine given to pregnant women can protect  babies against pertussis.  TETANUS (Lockjaw) is rare in the [place] today. It causes painful muscle tightening and stiffness, usually all over the body.  · It can lead to tightening of muscles in the head and neck so you can't open your mouth, swallow, or sometimes even breathe. Tetanus kills about 1 out of 10 people who are infected even after receiving the best medical care.    DIPHTHERIA is also rare in the [place] today. It can cause a thick coating to form in the back of the throat.  · It can lead to breathing problems, heart failure, paralysis, and death.    PERTUSSIS (Whooping Cough) causes severe coughing spells, which can cause difficulty breathing, vomiting and disturbed sleep.  · It can also lead to weight loss, incontinence, and rib fractures. Up to 2 in 100 adolescents and 5 in 100 adults with pertussis are hospitalized or have complications, which could include pneumonia or death.    These diseases are caused by bacteria. Diphtheria and pertussis are spread from person to person through secretions from coughing or sneezing. Tetanus enters the body through cuts, scratches, or wounds.  Before vaccines, as many as 200,000 cases of diphtheria, 200,000 cases of pertussis, and hundreds of cases of tetanus, were reported in the [place] each year. Since vaccination began, reports of cases for tetanus and diphtheria have dropped by about 99% and for pertussis by about 80%.  2. Tdap vaccine  Tdap vaccine can protect adolescents and adults from tetanus, diphtheria, and pertussis. One dose of Tdap is routinely given at age 11 or 12. People who did not get Tdap at that age should get it as soon as possible.  Tdap is especially  important for healthcare professionals and anyone having close contact with a baby younger than 12 months.  Pregnant women should get a dose of Tdap during every pregnancy, to protect the  from pertussis. Infants are most at risk for severe, life-threatening complications from pertussis.  Another vaccine, called Td, protects against tetanus and diphtheria, but not pertussis. A Td booster should be given every 10 years. Tdap may be given as one of these boosters if you have never gotten Tdap before. Tdap may also be given after a severe cut or burn to prevent tetanus infection.  Your doctor or the person giving you the vaccine can give you more information.  Tdap may safely be given at the same time as other vaccines.  3. Some people should not get this vaccine  · A person who has ever had a life-threatening allergic reaction after a previous dose of any diphtheria, tetanus or pertussis containing vaccine, OR has a severe allergy to any part of this vaccine, should not get Tdap vaccine. Tell the person giving the vaccine about any severe allergies.  · Anyone who had coma or long repeated seizures within 7 days after a childhood dose of DTP or DTaP, or a previous dose of Tdap, should not get Tdap, unless a cause other than the vaccine was found. They can still get Td.  · Talk to your doctor if you:  ? have seizures or another nervous system problem,  ? had severe pain or swelling after any vaccine containing diphtheria, tetanus or pertussis,  ? ever had a condition called Guillain-Barré Syndrome (GBS),  ? aren't feeling well on the day the shot is scheduled.  4. Risks  With any medicine, including vaccines, there is a chance of side effects. These are usually mild and go away on their own. Serious reactions are also possible but are rare.  Most people who get Tdap vaccine do not have any problems with it.  Mild problems following Tdap:  (Did not interfere with activities)  · Pain where the shot was given (about  3 in 4 adolescents or 2 in 3 adults)  · Redness or swelling where the shot was given (about 1 person in 5)  · Mild fever of at least 100.4°F (up to about 1 in 25 adolescents or 1 in 100 adults)  · Headache (about 3 or 4 people in 10)  · Tiredness (about 1 person in 3 or 4)  · Nausea, vomiting, diarrhea, stomach ache (up to 1 in 4 adolescents or 1 in 10 adults)  · Chills, sore joints (about 1 person in 10)  · Body aches (about 1 person in 3 or 4)  · Rash, swollen glands (uncommon)    Moderate problems following Tdap:  (Interfered with activities, but did not require medical attention)  · Pain where the shot was given (up to 1 in 5 or 6)  · Redness or swelling where the shot was given (up to about 1 in 16 adolescents or 1 in 12 adults)  · Fever over 102°F (about 1 in 100 adolescents or 1 in 250 adults)  · Headache (about 1 in 7 adolescents or 1 in 10 adults)  · Nausea, vomiting, diarrhea, stomach ache (up to 1 or 3 people in 100)  · Swelling of the entire arm where the shot was given (up to about 1 in 500).    Severe problems following Tdap:  (Unable to perform usual activities; required medical attention)  · Swelling, severe pain, bleeding and redness in the arm where the shot was given (rare).    Problems that could happen after any vaccine:  · People sometimes faint after a medical procedure, including vaccination. Sitting or lying down for about 15 minutes can help prevent fainting, and injuries caused by a fall. Tell your doctor if you feel dizzy, or have vision changes or ringing in the ears.  · Some people get severe pain in the shoulder and have difficulty moving the arm where a shot was given. This happens very rarely.  · Any medication can cause a severe allergic reaction. Such reactions from a vaccine are very rare, estimated at fewer than 1 in a million doses, and would happen within a few minutes to a few hours after the vaccination.  As with any medicine, there is a very remote chance of a vaccine  causing a serious injury or death.  The safety of vaccines is always being monitored. For more information, visit: www.cdc.gov/vaccinesafety/  5. What if there is a serious problem?  What should I look for?  Look for anything that concerns you, such as signs of a severe allergic reaction, very high fever, or unusual behavior.  Signs of a severe allergic reaction can include hives, swelling of the face and throat, difficulty breathing, a fast heartbeat, dizziness, and weakness. These would usually start a few minutes to a few hours after the vaccination.  What should I do?  · If you think it is a severe allergic reaction or other emergency that can’t wait, call 9-1-1 or get the person to the nearest hospital. Otherwise, call your doctor.  · Afterward, the reaction should be reported to the Vaccine Adverse Event Reporting System (VAERS). Your doctor might file this report, or you can do it yourself through the VAERS web site at www.vaers.hhs.gov, or by calling 1-532.900.4772.  ? VAERS does not give medical advice.  6. The National Vaccine Injury Compensation Program  The National Vaccine Injury Compensation Program (VICP) is a federal program that was created to compensate people who may have been injured by certain vaccines.  Persons who believe they may have been injured by a vaccine can learn about the program and about filing a claim by calling 1-427.127.6604 or visiting the VICP website at www.hrsa.gov/vaccinecompensation. There is a time limit to file a claim for compensation.  7. How can I learn more?  · Ask your doctor. He or she can give you the vaccine package insert or suggest other sources of information.  · Call your local or state health department.  · Contact the Centers for Disease Control and Prevention (CDC):  ? Call 1-176.263.6691 (8-351-VYS-INFO) or  ? Visit CDC’s website at www.cdc.gov/vaccines  CDC Tdap Vaccine VIS (2/24/15)  This information is not intended to replace advice given to you by your  health care provider. Make sure you discuss any questions you have with your health care provider.  Document Released: 06/18/2013 Document Revised: 09/07/2017 Document Reviewed: 09/07/2017  Elsevier Interactive Patient Education © 2017 [employer]

## 2018-08-09 ENCOUNTER — APPOINTMENT (OUTPATIENT)
Dept: CARDIOLOGY | Facility: HOSPITAL | Age: 63
End: 2018-08-09
Attending: INTERNAL MEDICINE

## 2018-08-15 RX ORDER — ERGOCALCIFEROL 1.25 MG/1
CAPSULE ORAL
Qty: 12 CAPSULE | Refills: 0 | Status: SHIPPED | OUTPATIENT
Start: 2018-08-15 | End: 2018-12-19 | Stop reason: SDUPTHER

## 2018-08-22 DIAGNOSIS — E78.5 HYPERLIPIDEMIA: ICD-10-CM

## 2018-08-22 RX ORDER — FOLIC ACID 1 MG/1
TABLET ORAL
Qty: 90 TABLET | Refills: 0 | Status: SHIPPED | OUTPATIENT
Start: 2018-08-22 | End: 2018-11-28 | Stop reason: SDUPTHER

## 2018-08-26 DIAGNOSIS — K21.9 GASTROESOPHAGEAL REFLUX DISEASE, ESOPHAGITIS PRESENCE NOT SPECIFIED: ICD-10-CM

## 2018-08-27 RX ORDER — PANTOPRAZOLE SODIUM 40 MG/1
TABLET, DELAYED RELEASE ORAL
Qty: 90 TABLET | Refills: 0 | Status: SHIPPED | OUTPATIENT
Start: 2018-08-27 | End: 2018-11-27 | Stop reason: SDUPTHER

## 2018-09-20 RX ORDER — ATORVASTATIN CALCIUM 40 MG/1
TABLET, FILM COATED ORAL
Qty: 90 TABLET | Refills: 0 | Status: SHIPPED | OUTPATIENT
Start: 2018-09-20 | End: 2019-01-19 | Stop reason: SDUPTHER

## 2018-10-16 DIAGNOSIS — J30.1 ALLERGIC RHINITIS DUE TO POLLEN: ICD-10-CM

## 2018-10-16 RX ORDER — LEVOCETIRIZINE DIHYDROCHLORIDE 5 MG/1
TABLET, FILM COATED ORAL
Qty: 90 TABLET | Refills: 0 | Status: SHIPPED | OUTPATIENT
Start: 2018-10-16 | End: 2018-10-24 | Stop reason: SDUPTHER

## 2018-10-24 ENCOUNTER — OFFICE VISIT (OUTPATIENT)
Dept: INTERNAL MEDICINE | Facility: CLINIC | Age: 63
End: 2018-10-24

## 2018-10-24 ENCOUNTER — TELEPHONE (OUTPATIENT)
Dept: INTERNAL MEDICINE | Facility: CLINIC | Age: 63
End: 2018-10-24

## 2018-10-24 VITALS
TEMPERATURE: 98.5 F | WEIGHT: 154.2 LBS | BODY MASS INDEX: 22.84 KG/M2 | RESPIRATION RATE: 16 BRPM | HEIGHT: 69 IN | SYSTOLIC BLOOD PRESSURE: 130 MMHG | OXYGEN SATURATION: 97 % | HEART RATE: 59 BPM | DIASTOLIC BLOOD PRESSURE: 74 MMHG

## 2018-10-24 DIAGNOSIS — G47.33 OBSTRUCTIVE SLEEP APNEA SYNDROME: Chronic | ICD-10-CM

## 2018-10-24 DIAGNOSIS — E78.5 HYPERLIPIDEMIA, UNSPECIFIED HYPERLIPIDEMIA TYPE: Primary | ICD-10-CM

## 2018-10-24 DIAGNOSIS — I10 ESSENTIAL HYPERTENSION: Primary | Chronic | ICD-10-CM

## 2018-10-24 DIAGNOSIS — I25.10 CORONARY ARTERY DISEASE INVOLVING NATIVE CORONARY ARTERY OF NATIVE HEART WITHOUT ANGINA PECTORIS: ICD-10-CM

## 2018-10-24 DIAGNOSIS — E78.5 HYPERLIPIDEMIA, UNSPECIFIED HYPERLIPIDEMIA TYPE: ICD-10-CM

## 2018-10-24 DIAGNOSIS — I48.0 PAROXYSMAL ATRIAL FIBRILLATION (HCC): ICD-10-CM

## 2018-10-24 DIAGNOSIS — K21.9 GASTROESOPHAGEAL REFLUX DISEASE WITHOUT ESOPHAGITIS: Chronic | ICD-10-CM

## 2018-10-24 DIAGNOSIS — D64.9 ANEMIA, UNSPECIFIED TYPE: ICD-10-CM

## 2018-10-24 PROCEDURE — 99214 OFFICE O/P EST MOD 30 MIN: CPT | Performed by: INTERNAL MEDICINE

## 2018-10-24 RX ORDER — PREDNISONE 10 MG/1
TABLET ORAL
Refills: 0 | COMMUNITY
Start: 2018-10-20 | End: 2019-01-18

## 2018-10-24 RX ORDER — AMOXICILLIN AND CLAVULANATE POTASSIUM 875; 125 MG/1; MG/1
TABLET, FILM COATED ORAL
Refills: 0 | COMMUNITY
Start: 2018-10-20 | End: 2019-01-18

## 2018-10-24 NOTE — PROGRESS NOTES
Subjective   Brian Barriga is a 63 y.o. male.     Chief Complaint   Patient presents with   • Hypertension   • Hyperlipidemia         Hypertension   This is a chronic problem. The current episode started more than 1 year ago. The problem is unchanged. The problem is controlled. Associated symptoms include chest pain and malaise/fatigue. Pertinent negatives include no headaches, palpitations, peripheral edema or shortness of breath. There are no associated agents to hypertension. Risk factors for coronary artery disease include dyslipidemia and male gender. Current antihypertension treatment includes beta blockers. The current treatment provides significant improvement. There are no compliance problems.  Hypertensive end-organ damage includes CAD/MI and CVA. There is no history of angina or kidney disease.   Hyperlipidemia   This is a chronic problem. The current episode started more than 1 year ago. The problem is controlled. Recent lipid tests were reviewed and are normal. Factors aggravating his hyperlipidemia include beta blockers. Associated symptoms include chest pain. Pertinent negatives include no shortness of breath. Current antihyperlipidemic treatment includes statins. The current treatment provides significant improvement of lipids. There are no compliance problems.  Risk factors for coronary artery disease include dyslipidemia, hypertension and male sex.   Hyperglycemia   This is a chronic problem. The current episode started more than 1 month ago. Associated symptoms include abdominal pain (gerd), chest pain, chills and fatigue. Pertinent negatives include no coughing, fever, headaches, nausea or vomiting.   Coronary Artery Disease   Presents for follow-up visit. Symptoms include chest pain. Pertinent negatives include no chest pressure, chest tightness, leg swelling, palpitations or shortness of breath. Risk factors include hyperlipidemia. The symptoms have been resolved. Compliance with diet is  good. Compliance with exercise is good. Compliance with medications is good.   Atrial Fibrillation   Presents for follow-up visit. Symptoms include chest pain. Symptoms are negative for palpitations and shortness of breath. The symptoms have been stable. Past medical history includes atrial fibrillation, CAD and hyperlipidemia. There are no medication compliance problems.        The following portions of the patient's history were reviewed and updated as appropriate: allergies, current medications, past social history and problem list.    Outpatient Prescriptions Marked as Taking for the 10/24/18 encounter (Office Visit) with Robert Light MD   Medication Sig Dispense Refill   • aspirin 81 MG EC tablet Take 81 mg by mouth Daily.     • atorvastatin (LIPITOR) 40 MG tablet TAKE 1 TABLET BY MOUTH EVERY MORNING 90 tablet 0   • clopidogrel (PLAVIX) 75 MG tablet TAKE 1 TABLET BY MOUTH EVERY DAY 90 tablet 0   • finasteride (PROSCAR) 5 MG tablet Take 1 tablet by mouth daily.     • folic acid (FOLVITE) 1 MG tablet TAKE 1 TABLET BY MOUTH DAILY 90 tablet 0   • levocetirizine (XYZAL) 5 MG tablet TAKE 1 TABLET BY MOUTH EVERY EVENING 90 tablet 0   • montelukast (SINGULAIR) 10 MG tablet TAKE 1 TABLET BY MOUTH EVERY DAY 90 tablet 1   • Multiple Vitamin (MULTI VITAMIN DAILY PO) Take 1 tablet by mouth Daily.     • pantoprazole (PROTONIX) 40 MG EC tablet TAKE 1 TABLET BY MOUTH EVERY MORNING 30 MINUTES BEFORE BREAKFAST 90 tablet 0   • valACYclovir (VALTREX) 1000 MG tablet TK 2 TS PO Q 12HOURS FOR 1 DAY UTD  2   • vitamin D (ERGOCALCIFEROL) 78601 units capsule capsule TAKE 1 CAPSULE BY MOUTH 1 TIME EVERY WEEK 12 capsule 0   • [DISCONTINUED] BREO ELLIPTA 100-25 MCG/INH aerosol powder  INL 1 PUFF PO QD  5       Review of Systems   Constitutional: Positive for chills, fatigue and malaise/fatigue. Negative for fever.   Respiratory: Negative for cough, chest tightness, shortness of breath and wheezing.    Cardiovascular: Positive for chest  pain. Negative for palpitations and leg swelling.   Gastrointestinal: Positive for abdominal pain (gerd). Negative for constipation, diarrhea, nausea and vomiting.   Neurological: Negative for headaches.       Objective   Vitals:    10/24/18 0904   BP: 130/74   Pulse: 59   Resp: 16   Temp: 98.5 °F (36.9 °C)   SpO2: 97%      1    10/24/18  0904   Weight: 69.9 kg (154 lb 3.2 oz)    [unfilled]  Body mass index is 22.76 kg/m².      Physical Exam   Constitutional: He appears well-developed and well-nourished. No distress.   HENT:   Head: Normocephalic and atraumatic.   Neck: Carotid bruit is not present. No thyromegaly present.   Cardiovascular: Normal rate, regular rhythm, normal heart sounds and intact distal pulses.  Exam reveals no gallop.    No murmur heard.  Pulmonary/Chest: Effort normal and breath sounds normal. No respiratory distress. He has no wheezes. He has no rales.   Abdominal: Soft. Bowel sounds are normal. He exhibits no mass. There is no tenderness. There is no guarding.         Problem List Items Addressed This Visit        Cardiovascular and Mediastinum    Hypertension - Primary (Chronic)    Paroxysmal atrial fibrillation (CMS/HCC)    Hyperlipidemia    Coronary artery disease involving native coronary artery of native heart       Respiratory    Obstructive sleep apnea syndrome (Chronic)       Digestive    Gastroesophageal reflux disease (Chronic)        Assessment/Plan   In for recheck of multiple medical problems.  Hypertension, hyperlipidemia, CAD, PAF.  History of stroke and hypercoagulable state.  Blood pressure control is excellent.  Lipids are pending.  No arrhythmias.   Had annual lab work August 2017.  Gets lipids every 3 months.  Urologist checks his PSA and prostate exam.  Had terrible fatigue 3 months ago.  We stopped his metoprolol.  That is improved.  At least 60% improved but not completely.  Just had a Holter 2 days ago.  That is still pending.  Has upcoming sinus surgery for  deviated septum.  Off of aspirin and Plavix.  Just had another sleep consultation and is getting ready to have his mouthguard evaluated.  He's got a current sinus infection so we'll hold off on his flu shot to his better.    Procedures             Dragon disclaimer:   Much of this encounter note is an electronic transcription/translation of spoken language to printed text. The electronic translation of spoken language may permit erroneous, or at times, nonsensical words or phrases to be inadvertently transcribed; Although I have reviewed the note for such errors, some may still exist.

## 2018-10-26 LAB
BASOPHILS # BLD AUTO: 0.01 10*3/MM3 (ref 0–0.2)
BASOPHILS NFR BLD AUTO: 0.1 % (ref 0–1.5)
CHOLEST SERPL-MCNC: 146 MG/DL (ref 0–200)
EOSINOPHIL # BLD AUTO: 0.01 10*3/MM3 (ref 0–0.7)
EOSINOPHIL NFR BLD AUTO: 0.1 % (ref 0.3–6.2)
ERYTHROCYTE [DISTWIDTH] IN BLOOD BY AUTOMATED COUNT: 14.9 % (ref 11.5–14.5)
HCT VFR BLD AUTO: 41.3 % (ref 40.4–52.2)
HDLC SERPL-MCNC: 72 MG/DL (ref 40–60)
HGB BLD-MCNC: 12.9 G/DL (ref 13.7–17.6)
IMM GRANULOCYTES # BLD: 0.01 10*3/MM3 (ref 0–0.03)
IMM GRANULOCYTES NFR BLD: 0.1 % (ref 0–0.5)
LDLC SERPL CALC-MCNC: 57 MG/DL (ref 0–100)
LYMPHOCYTES # BLD AUTO: 2.13 10*3/MM3 (ref 0.9–4.8)
LYMPHOCYTES NFR BLD AUTO: 30.5 % (ref 19.6–45.3)
MCH RBC QN AUTO: 30.9 PG (ref 27–32.7)
MCHC RBC AUTO-ENTMCNC: 31.2 G/DL (ref 32.6–36.4)
MCV RBC AUTO: 98.8 FL (ref 79.8–96.2)
MONOCYTES # BLD AUTO: 0.75 10*3/MM3 (ref 0.2–1.2)
MONOCYTES NFR BLD AUTO: 10.7 % (ref 5–12)
NEUTROPHILS # BLD AUTO: 4.09 10*3/MM3 (ref 1.9–8.1)
NEUTROPHILS NFR BLD AUTO: 58.6 % (ref 42.7–76)
PLATELET # BLD AUTO: 202 10*3/MM3 (ref 140–500)
RBC # BLD AUTO: 4.18 10*6/MM3 (ref 4.6–6)
TRIGL SERPL-MCNC: 84 MG/DL (ref 0–150)
VLDLC SERPL CALC-MCNC: 16.8 MG/DL (ref 5–40)
WBC # BLD AUTO: 6.99 10*3/MM3 (ref 4.5–10.7)

## 2018-11-27 DIAGNOSIS — K21.9 GASTROESOPHAGEAL REFLUX DISEASE, ESOPHAGITIS PRESENCE NOT SPECIFIED: ICD-10-CM

## 2018-11-27 DIAGNOSIS — E78.5 HYPERLIPIDEMIA: ICD-10-CM

## 2018-11-27 RX ORDER — PANTOPRAZOLE SODIUM 40 MG/1
TABLET, DELAYED RELEASE ORAL
Qty: 90 TABLET | Refills: 0 | Status: SHIPPED | OUTPATIENT
Start: 2018-11-27 | End: 2019-02-20 | Stop reason: SDUPTHER

## 2018-11-28 RX ORDER — FOLIC ACID 1 MG/1
TABLET ORAL
Qty: 90 TABLET | Refills: 0 | Status: SHIPPED | OUTPATIENT
Start: 2018-11-28 | End: 2019-03-01 | Stop reason: SDUPTHER

## 2018-12-19 RX ORDER — ERGOCALCIFEROL 1.25 MG/1
CAPSULE ORAL
Qty: 12 CAPSULE | Refills: 0 | Status: SHIPPED | OUTPATIENT
Start: 2018-12-19 | End: 2019-03-09 | Stop reason: SDUPTHER

## 2019-01-18 ENCOUNTER — OFFICE VISIT (OUTPATIENT)
Dept: INTERNAL MEDICINE | Facility: CLINIC | Age: 64
End: 2019-01-18

## 2019-01-18 VITALS
OXYGEN SATURATION: 98 % | DIASTOLIC BLOOD PRESSURE: 88 MMHG | RESPIRATION RATE: 16 BRPM | WEIGHT: 155 LBS | TEMPERATURE: 98.4 F | HEIGHT: 69 IN | SYSTOLIC BLOOD PRESSURE: 140 MMHG | BODY MASS INDEX: 22.96 KG/M2 | HEART RATE: 58 BPM

## 2019-01-18 DIAGNOSIS — Z79.899 MEDICATION MANAGEMENT: ICD-10-CM

## 2019-01-18 DIAGNOSIS — J01.00 SUBACUTE MAXILLARY SINUSITIS: Primary | ICD-10-CM

## 2019-01-18 DIAGNOSIS — I25.10 CORONARY ARTERY DISEASE INVOLVING NATIVE CORONARY ARTERY OF NATIVE HEART WITHOUT ANGINA PECTORIS: ICD-10-CM

## 2019-01-18 LAB
ALBUMIN SERPL-MCNC: 4.1 G/DL (ref 3.5–5.2)
ALBUMIN/GLOB SERPL: 1.5 G/DL
ALP SERPL-CCNC: 93 U/L (ref 39–117)
ALT SERPL-CCNC: 37 U/L (ref 1–41)
AST SERPL-CCNC: 50 U/L (ref 1–40)
BASOPHILS # BLD AUTO: 0.02 10*3/MM3 (ref 0–0.2)
BASOPHILS NFR BLD AUTO: 0.3 % (ref 0–1.5)
BILIRUB SERPL-MCNC: 0.5 MG/DL (ref 0.1–1.2)
BUN SERPL-MCNC: 13 MG/DL (ref 8–23)
BUN/CREAT SERPL: 14 (ref 7–25)
CALCIUM SERPL-MCNC: 9.3 MG/DL (ref 8.6–10.5)
CHLORIDE SERPL-SCNC: 102 MMOL/L (ref 98–107)
CHOLEST SERPL-MCNC: 137 MG/DL (ref 0–200)
CO2 SERPL-SCNC: 29 MMOL/L (ref 22–29)
CREAT SERPL-MCNC: 0.93 MG/DL (ref 0.76–1.27)
EOSINOPHIL # BLD AUTO: 0.11 10*3/MM3 (ref 0–0.7)
EOSINOPHIL NFR BLD AUTO: 1.4 % (ref 0.3–6.2)
ERYTHROCYTE [DISTWIDTH] IN BLOOD BY AUTOMATED COUNT: 14.7 % (ref 11.5–14.5)
GLOBULIN SER CALC-MCNC: 2.7 GM/DL
GLUCOSE SERPL-MCNC: 86 MG/DL (ref 65–99)
HCT VFR BLD AUTO: 43.9 % (ref 40.4–52.2)
HDLC SERPL-MCNC: 57 MG/DL (ref 40–60)
HGB BLD-MCNC: 13.4 G/DL (ref 13.7–17.6)
IMM GRANULOCYTES # BLD AUTO: 0 10*3/MM3 (ref 0–0.03)
IMM GRANULOCYTES NFR BLD AUTO: 0 % (ref 0–0.5)
LDLC SERPL CALC-MCNC: 63 MG/DL (ref 0–100)
LYMPHOCYTES # BLD AUTO: 1.25 10*3/MM3 (ref 0.9–4.8)
LYMPHOCYTES NFR BLD AUTO: 15.7 % (ref 19.6–45.3)
MCH RBC QN AUTO: 30.8 PG (ref 27–32.7)
MCHC RBC AUTO-ENTMCNC: 30.5 G/DL (ref 32.6–36.4)
MCV RBC AUTO: 100.9 FL (ref 79.8–96.2)
MONOCYTES # BLD AUTO: 0.64 10*3/MM3 (ref 0.2–1.2)
MONOCYTES NFR BLD AUTO: 8 % (ref 5–12)
NEUTROPHILS # BLD AUTO: 5.96 10*3/MM3 (ref 1.9–8.1)
NEUTROPHILS NFR BLD AUTO: 74.6 % (ref 42.7–76)
PLATELET # BLD AUTO: 202 10*3/MM3 (ref 140–500)
POTASSIUM SERPL-SCNC: 4.4 MMOL/L (ref 3.5–5.2)
PROT SERPL-MCNC: 6.8 G/DL (ref 6–8.5)
RBC # BLD AUTO: 4.35 10*6/MM3 (ref 4.6–6)
SODIUM SERPL-SCNC: 141 MMOL/L (ref 136–145)
TRIGL SERPL-MCNC: 84 MG/DL (ref 0–150)
VLDLC SERPL CALC-MCNC: 16.8 MG/DL (ref 5–40)
WBC # BLD AUTO: 7.98 10*3/MM3 (ref 4.5–10.7)

## 2019-01-18 PROCEDURE — 99213 OFFICE O/P EST LOW 20 MIN: CPT | Performed by: INTERNAL MEDICINE

## 2019-01-18 RX ORDER — METHYLPREDNISOLONE 4 MG/1
TABLET ORAL
Qty: 1 EACH | Refills: 0 | Status: SHIPPED | OUTPATIENT
Start: 2019-01-18 | End: 2019-01-23

## 2019-01-18 RX ORDER — MONTELUKAST SODIUM 10 MG/1
10 TABLET ORAL DAILY
Qty: 90 TABLET | Refills: 1 | Status: SHIPPED | OUTPATIENT
Start: 2019-01-18 | End: 2020-05-19

## 2019-01-18 RX ORDER — AMOXICILLIN AND CLAVULANATE POTASSIUM 875; 125 MG/1; MG/1
1 TABLET, FILM COATED ORAL EVERY 12 HOURS SCHEDULED
Qty: 20 TABLET | Refills: 0 | Status: SHIPPED | OUTPATIENT
Start: 2019-01-18 | End: 2019-02-05

## 2019-01-18 NOTE — PROGRESS NOTES
Subjective   Brian Barriga is a 63 y.o. male.     Chief Complaint   Patient presents with   • GI Problem     irregular bowel movements x 3 wks         GI Problem   The primary symptoms include nausea, vomiting and diarrhea. Primary symptoms do not include fever. The illness began more than 7 days ago. The problem has not changed since onset.  The illness is also significant for chills.   Sinus Problem   This is a recurrent problem. The current episode started more than 1 month ago. The problem has been waxing and waning since onset. There has been no fever. Associated symptoms include chills and sinus pressure. Past treatments include antibiotics.        The following portions of the patient's history were reviewed and updated as appropriate: allergies, current medications, past social history and problem list.    Outpatient Medications Marked as Taking for the 1/18/19 encounter (Office Visit) with Robert Light MD   Medication Sig Dispense Refill   • aspirin 81 MG EC tablet Take 81 mg by mouth Daily.     • atorvastatin (LIPITOR) 40 MG tablet TAKE 1 TABLET BY MOUTH EVERY MORNING 90 tablet 0   • finasteride (PROSCAR) 5 MG tablet Take 1 tablet by mouth daily.     • folic acid (FOLVITE) 1 MG tablet TAKE 1 TABLET BY MOUTH DAILY 90 tablet 0   • levocetirizine (XYZAL) 5 MG tablet TAKE 1 TABLET BY MOUTH EVERY EVENING 90 tablet 0   • montelukast (SINGULAIR) 10 MG tablet TAKE 1 TABLET BY MOUTH EVERY DAY 90 tablet 1   • Multiple Vitamin (MULTI VITAMIN DAILY PO) Take 1 tablet by mouth Daily.     • pantoprazole (PROTONIX) 40 MG EC tablet TAKE 1 TABLET BY MOUTH EVERY MORNING 30 MINUTES BEFORE BREAKFAST 90 tablet 0   • vitamin D (ERGOCALCIFEROL) 29788 units capsule capsule TAKE 1 CAPSULE BY MOUTH 1 TIME EVERY WEEK 12 capsule 0       Review of Systems   Constitutional: Positive for chills. Negative for fever.   HENT: Positive for postnasal drip and sinus pressure.    Gastrointestinal: Positive for diarrhea, nausea and  vomiting.       Objective   Vitals:    01/18/19 1101   BP: 140/88   Pulse: 58   Resp: 16   Temp: 98.4 °F (36.9 °C)   SpO2: 98%          01/18/19  1101   Weight: 70.3 kg (155 lb)    [unfilled]  Body mass index is 22.88 kg/m².      Physical Exam   Constitutional: He appears well-developed and well-nourished.   HENT:   Head: Normocephalic and atraumatic.   Right Ear: External ear normal.   Left Ear: External ear normal.   Nose: Nose normal.   Mouth/Throat: Oropharynx is clear and moist.   Eyes: Conjunctivae are normal. Pupils are equal, round, and reactive to light.   Pulmonary/Chest: Effort normal and breath sounds normal. No respiratory distress. He has no wheezes. He has no rales.   Skin: Skin is warm and dry.         Problem List Items Addressed This Visit     None      Visit Diagnoses     Subacute maxillary sinusitis    -  Primary        Assessment/Plan   Began a few months ago with a sinus infection.  Took a round of Augmentin.  That was followed by a septoplasty.  Things seem to be going well until the last few weeks.  He developed recurrent sinus pressure in the maxillary region.  Some chills.  Just not feeling well.  Was seen and given doxycycline.  It caused nausea and vomiting.  He tried it twice.  Same result.  He discontinued it.  He's had some loose stools on and off.  A couple of this occurred in the middle the night.  Right now he is doing pretty well GI wise.  He continues to have chills and sinus pressure.  He was on Singulair for a while from his allergist but he's ran out.  We'll add a Medrol Dosepak.  We'll give 1 week of Augmentin.  [facility] annual lab work today for his upcoming physical.  That will include a CBC, CMP, lipids.  Urologist checks his PSA.  Gets lipids every 3 months.           .[email or web address]  Dragon disclaimer:   Much of this encounter note is an electronic transcription/translation of spoken language to printed text. The electronic translation of spoken language may permit  erroneous, or at times, nonsensical words or phrases to be inadvertently transcribed; Although I have reviewed the note for such errors, some may still exist.

## 2019-01-19 DIAGNOSIS — J30.1 ALLERGIC RHINITIS DUE TO POLLEN: ICD-10-CM

## 2019-01-21 ENCOUNTER — TELEPHONE (OUTPATIENT)
Dept: INTERNAL MEDICINE | Facility: CLINIC | Age: 64
End: 2019-01-21

## 2019-01-21 RX ORDER — LEVOCETIRIZINE DIHYDROCHLORIDE 5 MG/1
TABLET, FILM COATED ORAL
Qty: 90 TABLET | Refills: 0 | Status: SHIPPED | OUTPATIENT
Start: 2019-01-21 | End: 2019-01-23

## 2019-01-21 RX ORDER — ATORVASTATIN CALCIUM 40 MG/1
TABLET, FILM COATED ORAL
Qty: 90 TABLET | Refills: 0 | Status: SHIPPED | OUTPATIENT
Start: 2019-01-21 | End: 2019-04-17 | Stop reason: SDUPTHER

## 2019-01-23 ENCOUNTER — OFFICE VISIT (OUTPATIENT)
Dept: INTERNAL MEDICINE | Facility: CLINIC | Age: 64
End: 2019-01-23

## 2019-01-23 VITALS
HEIGHT: 69 IN | TEMPERATURE: 98.6 F | DIASTOLIC BLOOD PRESSURE: 70 MMHG | WEIGHT: 159 LBS | HEART RATE: 109 BPM | SYSTOLIC BLOOD PRESSURE: 128 MMHG | OXYGEN SATURATION: 100 % | BODY MASS INDEX: 23.55 KG/M2 | RESPIRATION RATE: 16 BRPM

## 2019-01-23 DIAGNOSIS — K21.9 GASTROESOPHAGEAL REFLUX DISEASE WITHOUT ESOPHAGITIS: Chronic | ICD-10-CM

## 2019-01-23 DIAGNOSIS — Z00.00 ENCOUNTER FOR PREVENTIVE HEALTH EXAMINATION: Primary | ICD-10-CM

## 2019-01-23 DIAGNOSIS — I25.10 CORONARY ARTERY DISEASE INVOLVING NATIVE CORONARY ARTERY OF NATIVE HEART WITHOUT ANGINA PECTORIS: ICD-10-CM

## 2019-01-23 DIAGNOSIS — I10 ESSENTIAL HYPERTENSION: Chronic | ICD-10-CM

## 2019-01-23 DIAGNOSIS — D68.59 HYPERCOAGULABLE STATE (HCC): Chronic | ICD-10-CM

## 2019-01-23 DIAGNOSIS — E78.5 HYPERLIPIDEMIA, UNSPECIFIED HYPERLIPIDEMIA TYPE: ICD-10-CM

## 2019-01-23 DIAGNOSIS — D49.0 IPMN (INTRADUCTAL PAPILLARY MUCINOUS NEOPLASM): ICD-10-CM

## 2019-01-23 PROBLEM — F33.8 SEASONAL AFFECTIVE DISORDER (HCC): Status: ACTIVE | Noted: 2019-01-23

## 2019-01-23 PROBLEM — B35.6 TINEA CRURIS: Status: RESOLVED | Noted: 2018-03-27 | Resolved: 2019-01-23

## 2019-01-23 PROCEDURE — 99213 OFFICE O/P EST LOW 20 MIN: CPT | Performed by: INTERNAL MEDICINE

## 2019-01-23 PROCEDURE — 99396 PREV VISIT EST AGE 40-64: CPT | Performed by: INTERNAL MEDICINE

## 2019-01-23 NOTE — PROGRESS NOTES
Subjective   Brian Barriga is a 63 y.o. male.     Chief Complaint   Patient presents with   • Annual Exam         In for annual preventative exam.  He sleeps well.  8 hours at night.  Exercises 6 days per week.  Exercises pretty aggressively.  Diet is good.  Energy is fair.         The following portions of the patient's history were reviewed and updated as appropriate: allergies, current medications, past social history and problem list.      Current Outpatient Medications:   •  amoxicillin-clavulanate (AUGMENTIN) 875-125 MG per tablet, Take 1 tablet by mouth Every 12 (Twelve) Hours., Disp: 20 tablet, Rfl: 0  •  aspirin 81 MG EC tablet, Take 81 mg by mouth Daily., Disp: , Rfl:   •  atorvastatin (LIPITOR) 40 MG tablet, TAKE 1 TABLET BY MOUTH EVERY MORNING, Disp: 90 tablet, Rfl: 0  •  finasteride (PROSCAR) 5 MG tablet, Take 1 tablet by mouth daily., Disp: , Rfl:   •  folic acid (FOLVITE) 1 MG tablet, TAKE 1 TABLET BY MOUTH DAILY, Disp: 90 tablet, Rfl: 0  •  levocetirizine (XYZAL) 5 MG tablet, TAKE 1 TABLET BY MOUTH EVERY EVENING, Disp: 90 tablet, Rfl: 0  •  montelukast (SINGULAIR) 10 MG tablet, Take 1 tablet by mouth Daily., Disp: 90 tablet, Rfl: 1  •  Multiple Vitamin (MULTI VITAMIN DAILY PO), Take 1 tablet by mouth Daily., Disp: , Rfl:   •  pantoprazole (PROTONIX) 40 MG EC tablet, TAKE 1 TABLET BY MOUTH EVERY MORNING 30 MINUTES BEFORE BREAKFAST, Disp: 90 tablet, Rfl: 0  •  vitamin D (ERGOCALCIFEROL) 26010 units capsule capsule, TAKE 1 CAPSULE BY MOUTH 1 TIME EVERY WEEK, Disp: 12 capsule, Rfl: 0    Review of Systems   Constitutional: Negative for chills, fatigue and fever.   HENT: Negative for congestion, ear pain, nosebleeds, rhinorrhea, sore throat and voice change.    Eyes: Negative for discharge, itching and visual disturbance.   Respiratory: Negative for cough, chest tightness, shortness of breath and wheezing.    Cardiovascular: Negative for chest pain, palpitations and leg swelling.   Gastrointestinal:  Negative for abdominal pain, anal bleeding, constipation, diarrhea, nausea and vomiting.   Endocrine: Negative for cold intolerance, heat intolerance and polyuria.   Genitourinary: Negative for difficulty urinating, dysuria, frequency, hematuria and urgency.   Musculoskeletal: Negative for arthralgias, back pain and myalgias.   Skin: Negative for rash and wound.   Allergic/Immunologic: Positive for environmental allergies.   Neurological: Negative for dizziness, syncope, weakness, light-headedness and headaches.   Hematological: Negative for adenopathy. Does not bruise/bleed easily.   Psychiatric/Behavioral: Positive for dysphoric mood. Negative for confusion and sleep disturbance. The patient is not nervous/anxious.        Objective   Vitals:    01/23/19 1037   BP: 128/70   Pulse: 109   Resp: 16   Temp: 98.6 °F (37 °C)   SpO2: 100%          01/23/19  1037   Weight: 72.1 kg (159 lb)    [unfilled]  Body mass index is 23.47 kg/m².      Physical Exam   Constitutional: He is oriented to person, place, and time. He appears well-developed and well-nourished.   HENT:   Head: Normocephalic and atraumatic.   Right Ear: External ear normal.   Left Ear: External ear normal.   Nose: Nose normal.   Mouth/Throat: Oropharynx is clear and moist.   Eyes: Conjunctivae and EOM are normal. Pupils are equal, round, and reactive to light. No scleral icterus.   Neck: Normal range of motion. Neck supple. No JVD present. No thyromegaly present.   Cardiovascular: Normal rate, regular rhythm, normal heart sounds and intact distal pulses. Exam reveals no gallop and no friction rub.   No murmur heard.  Pulmonary/Chest: Effort normal and breath sounds normal. No respiratory distress. He has no wheezes. He has no rales.   Abdominal: Soft. Bowel sounds are normal. He exhibits no distension and no mass. There is no tenderness. There is no rebound and no guarding. No hernia.   Musculoskeletal: Normal range of motion. He exhibits no edema.      Vascular Status -  His right foot exhibits abnormal foot edema (1+). His left foot exhibits abnormal foot edema (1+).  Lymphadenopathy:     He has no cervical adenopathy.   Neurological: He is alert and oriented to person, place, and time. He has normal reflexes. He displays normal reflexes.   Skin: Skin is warm and dry.   Psychiatric: He has a normal mood and affect. His behavior is normal. Judgment and thought content normal.   Nursing note and vitals reviewed.        Problem List Items Addressed This Visit        Cardiovascular and Mediastinum    Hypertension (Chronic)    Hyperlipidemia    Coronary artery disease involving native coronary artery of native heart       Digestive    Gastroesophageal reflux disease (Chronic)       Hematopoietic and Hemostatic    Hypercoagulable state (CMS/HCC) (Chronic)      Other Visit Diagnoses     Encounter for preventive health examination    -  Primary        Assessment/Plan   In for annual preventative exam.  Overall he presents with a variety of complex issues.  He has a history of hypertension, paroxysmal atrial fibrillation, and hypercoagulable state with MTHFR mutation and elevated homocystine.  Had a series of strokes 2012 and 2014 and was treated with thrombolytics and clot extraction systemic thrombolytics again.  He's got two stents in the left carotid system and on chronic Plavix for that.  He has coronary artery disease with a recent stent placed in the LAD following an coronary calcium score.  Has some mild allergies and allergy related asthma.  Obstructive sleep apnea.  GERD.  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  He sees Dr. Mata for urology purposes and  for GI purposes.  He's had quite a bit of lab work which is up-to-date.  He'll need a lipid profile every 3 months.  Annual lab work today January 2019.  He is on aspirin low for a coagulation at this point.  We discussed dual  anticoagulation and triple anticoagulation today.  I think with his history certainly sounds like he should be on eliquis.  I would give him some heart attack prevention, stroke prevention.  The issue of adding Plavix on top of that would be a tough call.  He'll need to discuss this with his cardiology group and hematology group.  We spent an extra 15 minutes today discussing dual anticoagulation therapy and triple and an article that he had for review.  He's due for another pancreatic protocol CT to keep an eye on his IPMN.  He knows next her he'll be on Medicare and Medicare does not cover preventative exams.  He is okay with that.

## 2019-02-05 ENCOUNTER — OFFICE VISIT (OUTPATIENT)
Dept: INTERNAL MEDICINE | Facility: CLINIC | Age: 64
End: 2019-02-05

## 2019-02-05 ENCOUNTER — HOSPITAL ENCOUNTER (OUTPATIENT)
Dept: CT IMAGING | Facility: HOSPITAL | Age: 64
Discharge: HOME | End: 2019-02-05
Attending: INTERNAL MEDICINE | Admitting: INTERNAL MEDICINE

## 2019-02-05 VITALS
HEART RATE: 52 BPM | SYSTOLIC BLOOD PRESSURE: 110 MMHG | DIASTOLIC BLOOD PRESSURE: 70 MMHG | WEIGHT: 155 LBS | BODY MASS INDEX: 22.96 KG/M2 | OXYGEN SATURATION: 98 % | RESPIRATION RATE: 16 BRPM | HEIGHT: 69 IN

## 2019-02-05 DIAGNOSIS — R19.7 DIARRHEA, UNSPECIFIED TYPE: Primary | ICD-10-CM

## 2019-02-05 LAB — CREAT BLDA-MCNC: 1 MG/DL (ref 0.6–1.3)

## 2019-02-05 PROCEDURE — 25010000002 IOPAMIDOL 61 % SOLUTION: Performed by: INTERNAL MEDICINE

## 2019-02-05 PROCEDURE — 82565 ASSAY OF CREATININE: CPT

## 2019-02-05 PROCEDURE — 74160 CT ABDOMEN W/CONTRAST: CPT

## 2019-02-05 PROCEDURE — 99213 OFFICE O/P EST LOW 20 MIN: CPT | Performed by: INTERNAL MEDICINE

## 2019-02-05 RX ORDER — SILDENAFIL CITRATE 20 MG/1
20 TABLET ORAL AS NEEDED
COMMUNITY
End: 2021-10-28

## 2019-02-05 RX ADMIN — IOPAMIDOL 85 ML: 612 INJECTION, SOLUTION INTRAVENOUS at 08:01

## 2019-02-05 NOTE — PROGRESS NOTES
Subjective   Brian Barriga is a 63 y.o. male.     Chief Complaint   Patient presents with   • Eye Problem   • Bloated   • Medication Reaction     Sildenafil side effects         Eye Problem    Pertinent negatives include no fever, nausea or vomiting.   Medication Reaction   This is a new problem. The current episode started more than 1 month ago. The problem occurs intermittently. The problem has been waxing and waning. Associated symptoms include a change in bowel habit. Pertinent negatives include no abdominal pain, chills, fever, nausea or vomiting. The symptoms are aggravated by eating. The treatment provided no relief.        The following portions of the patient's history were reviewed and updated as appropriate: allergies, current medications, past social history and problem list.    Outpatient Medications Marked as Taking for the 2/5/19 encounter (Office Visit) with Robert Light MD   Medication Sig Dispense Refill   • aspirin 81 MG EC tablet Take 81 mg by mouth Daily.     • atorvastatin (LIPITOR) 40 MG tablet TAKE 1 TABLET BY MOUTH EVERY MORNING 90 tablet 0   • finasteride (PROSCAR) 5 MG tablet Take 1 tablet by mouth daily.     • folic acid (FOLVITE) 1 MG tablet TAKE 1 TABLET BY MOUTH DAILY 90 tablet 0   • levocetirizine (XYZAL) 5 MG tablet TAKE 1 TABLET BY MOUTH EVERY EVENING 90 tablet 0   • montelukast (SINGULAIR) 10 MG tablet Take 1 tablet by mouth Daily. 90 tablet 1   • Multiple Vitamin (MULTI VITAMIN DAILY PO) Take 1 tablet by mouth Daily.     • pantoprazole (PROTONIX) 40 MG EC tablet TAKE 1 TABLET BY MOUTH EVERY MORNING 30 MINUTES BEFORE BREAKFAST 90 tablet 0   • sildenafil (REVATIO) 20 MG tablet Take 20 mg by mouth As Needed.     • vitamin D (ERGOCALCIFEROL) 08226 units capsule capsule TAKE 1 CAPSULE BY MOUTH 1 TIME EVERY WEEK 12 capsule 0       Review of Systems   Constitutional: Negative for chills and fever.   Gastrointestinal: Positive for change in bowel habit and diarrhea. Negative for  abdominal pain, blood in stool, nausea and vomiting.       Objective   Vitals:    02/05/19 1523   BP: 110/70   Pulse: 52   Resp: 16   SpO2: 98%          02/05/19  1523   Weight: 70.3 kg (155 lb)    [unfilled]  Body mass index is 22.88 kg/m².      Physical Exam   Constitutional: He appears well-developed and well-nourished.   Abdominal: Soft. Bowel sounds are normal. He exhibits no distension and no mass. There is no tenderness. There is no guarding.         Problem List Items Addressed This Visit     None      Visit Diagnoses     Diarrhea, unspecified type    -  Primary        Assessment/Plan   Began about one month ago with gas bloat symptoms.  Some loose stools.  He seems to have a loose stool after a meal.  He previously had 1 bowel movement daily.  That sounds like a sensitive gastrocolic reflex.  He did travel to the [facility] and [place] shortly before the symptoms began.  He might picked up some type of an infection on one of those trips.  Nothing that he was aware of.  We discussed the possibility of acquired lactose intolerance.  He needs to avoid lactose.  It turns out he is very little in the way of dairy products.  We'll get a stool PCR evaluation.  He'll be taking a probiotic daily for now.  He also had several rounds of antibiotics in the last few months related to some eye surgery so we will have to be mindful of the possibility of C. difficile infection.  We also reviewed his abdominal CT today.  It is unchanged.           .bmifollowup  Dragon disclaimer:   Much of this encounter note is an electronic transcription/translation of spoken language to printed text. The electronic translation of spoken language may permit erroneous, or at times, nonsensical words or phrases to be inadvertently transcribed; Although I have reviewed the note for such errors, some may still exist.

## 2019-02-20 DIAGNOSIS — K21.9 GASTROESOPHAGEAL REFLUX DISEASE, ESOPHAGITIS PRESENCE NOT SPECIFIED: ICD-10-CM

## 2019-02-20 DIAGNOSIS — E78.5 HYPERLIPIDEMIA: ICD-10-CM

## 2019-02-20 RX ORDER — PANTOPRAZOLE SODIUM 40 MG/1
TABLET, DELAYED RELEASE ORAL
Qty: 90 TABLET | Refills: 0 | Status: SHIPPED | OUTPATIENT
Start: 2019-02-20 | End: 2019-05-17 | Stop reason: SDUPTHER

## 2019-02-20 RX ORDER — FOLIC ACID 1 MG/1
TABLET ORAL
Qty: 90 TABLET | Refills: 0 | Status: SHIPPED | OUTPATIENT
Start: 2019-02-20 | End: 2019-05-17 | Stop reason: SDUPTHER

## 2019-03-01 ENCOUNTER — OFFICE VISIT (OUTPATIENT)
Dept: INTERNAL MEDICINE | Facility: CLINIC | Age: 64
End: 2019-03-01

## 2019-03-01 VITALS
HEART RATE: 71 BPM | WEIGHT: 160.4 LBS | TEMPERATURE: 98.2 F | SYSTOLIC BLOOD PRESSURE: 142 MMHG | OXYGEN SATURATION: 98 % | RESPIRATION RATE: 16 BRPM | DIASTOLIC BLOOD PRESSURE: 86 MMHG | BODY MASS INDEX: 23.76 KG/M2 | HEIGHT: 69 IN

## 2019-03-01 DIAGNOSIS — J01.00 SUBACUTE MAXILLARY SINUSITIS: Primary | ICD-10-CM

## 2019-03-01 PROCEDURE — 99213 OFFICE O/P EST LOW 20 MIN: CPT | Performed by: INTERNAL MEDICINE

## 2019-03-01 RX ORDER — AMOXICILLIN AND CLAVULANATE POTASSIUM 875; 125 MG/1; MG/1
1 TABLET, FILM COATED ORAL EVERY 12 HOURS SCHEDULED
Qty: 14 TABLET | Refills: 0 | Status: SHIPPED | OUTPATIENT
Start: 2019-03-01 | End: 2019-04-26

## 2019-03-01 NOTE — PROGRESS NOTES
Subjective   Brian Barriga is a 63 y.o. male.     Chief Complaint   Patient presents with   • Chills   • Adenopathy   • Cough     green phlegm         Chills   This is a new problem. The current episode started 1 to 4 weeks ago. The problem occurs intermittently. Associated symptoms include chills, coughing and a sore throat. Pertinent negatives include no fever. Nothing aggravates the symptoms. The treatment provided moderate relief.   Cough   This is a new problem. The current episode started 1 to 4 weeks ago. The problem has been waxing and waning. Associated symptoms include chills, nasal congestion, postnasal drip and a sore throat. Pertinent negatives include no fever or rhinorrhea. The treatment provided moderate relief.        The following portions of the patient's history were reviewed and updated as appropriate: allergies, current medications, past social history and problem list.    Outpatient Medications Marked as Taking for the 3/1/19 encounter (Office Visit) with Robert Light MD   Medication Sig Dispense Refill   • aspirin 81 MG EC tablet Take 81 mg by mouth Daily.     • atorvastatin (LIPITOR) 40 MG tablet TAKE 1 TABLET BY MOUTH EVERY MORNING 90 tablet 0   • finasteride (PROSCAR) 5 MG tablet Take 1 tablet by mouth daily.     • folic acid (FOLVITE) 1 MG tablet TAKE 1 TABLET BY MOUTH DAILY 90 tablet 0   • levocetirizine (XYZAL) 5 MG tablet TAKE 1 TABLET BY MOUTH EVERY EVENING 90 tablet 0   • montelukast (SINGULAIR) 10 MG tablet Take 1 tablet by mouth Daily. 90 tablet 1   • Multiple Vitamin (MULTI VITAMIN DAILY PO) Take 1 tablet by mouth Daily.     • pantoprazole (PROTONIX) 40 MG EC tablet TAKE 1 TABLET BY MOUTH EVERY MORNING 30 MINUTES BEFORE BREAKFAST 90 tablet 0   • sildenafil (REVATIO) 20 MG tablet Take 20 mg by mouth As Needed.     • vitamin D (ERGOCALCIFEROL) 89735 units capsule capsule TAKE 1 CAPSULE BY MOUTH 1 TIME EVERY WEEK 12 capsule 0       Review of Systems   Constitutional: Positive  for chills. Negative for fever.   HENT: Positive for postnasal drip and sore throat. Negative for rhinorrhea.    Respiratory: Positive for cough.        Objective   Vitals:    03/01/19 1055   BP: 142/86   Pulse: 71   Resp: 16   Temp: 98.2 °F (36.8 °C)   SpO2: 98%          03/01/19  1055   Weight: 72.8 kg (160 lb 6.4 oz)    [unfilled]  Body mass index is 23.68 kg/m².      Physical Exam   Constitutional: He appears well-developed and well-nourished.   HENT:   Head: Normocephalic and atraumatic.   Right Ear: External ear normal.   Left Ear: External ear normal.   Nose: Nose normal.   Mouth/Throat: Oropharynx is clear and moist.   Eyes: Conjunctivae are normal. Pupils are equal, round, and reactive to light.   Pulmonary/Chest: Effort normal and breath sounds normal. No respiratory distress. He has no wheezes. He has no rales.   Skin: Skin is warm and dry.         Problem List Items Addressed This Visit     None      Visit Diagnoses     Subacute maxillary sinusitis    -  Primary        Assessment/Plan   In with 10-day illness.  Congestion.  Postnasal drip.  Some cough.  He has had a lot of chilling.  Was seen at [facility] 9 days ago and given a round of Medrol and amoxicillin.  He felt better initially but then started feeling ill again about 4 days ago.  Having a lot of chills.  Tired.  Coughing some.  We will add some Augmentin today.  875 twice daily for 1 additional week.           .bmifollowup  Dragon disclaimer:   Much of this encounter note is an electronic transcription/translation of spoken language to printed text. The electronic translation of spoken language may permit erroneous, or at times, nonsensical words or phrases to be inadvertently transcribed; Although I have reviewed the note for such errors, some may still exist.

## 2019-03-11 RX ORDER — ERGOCALCIFEROL 1.25 MG/1
CAPSULE ORAL
Qty: 12 CAPSULE | Refills: 0 | Status: SHIPPED | OUTPATIENT
Start: 2019-03-11 | End: 2019-06-02 | Stop reason: SDUPTHER

## 2019-04-17 DIAGNOSIS — J30.1 ALLERGIC RHINITIS DUE TO POLLEN: ICD-10-CM

## 2019-04-17 RX ORDER — LEVOCETIRIZINE DIHYDROCHLORIDE 5 MG/1
TABLET, FILM COATED ORAL
Qty: 90 TABLET | Refills: 0 | Status: SHIPPED | OUTPATIENT
Start: 2019-04-17 | End: 2019-07-08 | Stop reason: SDUPTHER

## 2019-04-17 RX ORDER — ATORVASTATIN CALCIUM 40 MG/1
TABLET, FILM COATED ORAL
Qty: 90 TABLET | Refills: 0 | Status: SHIPPED | OUTPATIENT
Start: 2019-04-17 | End: 2019-07-08 | Stop reason: SDUPTHER

## 2019-04-26 ENCOUNTER — OFFICE VISIT (OUTPATIENT)
Dept: INTERNAL MEDICINE | Facility: CLINIC | Age: 64
End: 2019-04-26

## 2019-04-26 VITALS
BODY MASS INDEX: 22.96 KG/M2 | DIASTOLIC BLOOD PRESSURE: 78 MMHG | TEMPERATURE: 98.1 F | HEIGHT: 69 IN | RESPIRATION RATE: 16 BRPM | OXYGEN SATURATION: 98 % | HEART RATE: 53 BPM | WEIGHT: 155 LBS | SYSTOLIC BLOOD PRESSURE: 122 MMHG

## 2019-04-26 DIAGNOSIS — I10 ESSENTIAL HYPERTENSION: Primary | Chronic | ICD-10-CM

## 2019-04-26 DIAGNOSIS — G47.33 OBSTRUCTIVE SLEEP APNEA SYNDROME: Chronic | ICD-10-CM

## 2019-04-26 DIAGNOSIS — I25.10 CORONARY ARTERY DISEASE INVOLVING NATIVE CORONARY ARTERY OF NATIVE HEART WITHOUT ANGINA PECTORIS: ICD-10-CM

## 2019-04-26 DIAGNOSIS — I48.0 PAROXYSMAL ATRIAL FIBRILLATION (HCC): ICD-10-CM

## 2019-04-26 DIAGNOSIS — E78.5 HYPERLIPIDEMIA, UNSPECIFIED HYPERLIPIDEMIA TYPE: ICD-10-CM

## 2019-04-26 DIAGNOSIS — R53.83 FATIGUE, UNSPECIFIED TYPE: ICD-10-CM

## 2019-04-26 DIAGNOSIS — K21.9 GASTROESOPHAGEAL REFLUX DISEASE WITHOUT ESOPHAGITIS: Chronic | ICD-10-CM

## 2019-04-26 LAB
BASOPHILS # BLD AUTO: 0.05 10*3/MM3 (ref 0–0.2)
BASOPHILS NFR BLD AUTO: 1.1 % (ref 0–1.5)
CHOLEST SERPL-MCNC: 137 MG/DL (ref 0–200)
EOSINOPHIL # BLD AUTO: 0.15 10*3/MM3 (ref 0–0.4)
EOSINOPHIL NFR BLD AUTO: 3.3 % (ref 0.3–6.2)
ERYTHROCYTE [DISTWIDTH] IN BLOOD BY AUTOMATED COUNT: 14.6 % (ref 12.3–15.4)
HCT VFR BLD AUTO: 46.3 % (ref 37.5–51)
HDLC SERPL-MCNC: 61 MG/DL (ref 40–60)
HGB BLD-MCNC: 14.1 G/DL (ref 13–17.7)
IMM GRANULOCYTES # BLD AUTO: 0.01 10*3/MM3 (ref 0–0.05)
IMM GRANULOCYTES NFR BLD AUTO: 0.2 % (ref 0–0.5)
LDLC SERPL CALC-MCNC: 62 MG/DL (ref 0–100)
LYMPHOCYTES # BLD AUTO: 1.2 10*3/MM3 (ref 0.7–3.1)
LYMPHOCYTES NFR BLD AUTO: 26.4 % (ref 19.6–45.3)
MCH RBC QN AUTO: 30.8 PG (ref 26.6–33)
MCHC RBC AUTO-ENTMCNC: 30.5 G/DL (ref 31.5–35.7)
MCV RBC AUTO: 101.1 FL (ref 79–97)
MONOCYTES # BLD AUTO: 0.58 10*3/MM3 (ref 0.1–0.9)
MONOCYTES NFR BLD AUTO: 12.8 % (ref 5–12)
NEUTROPHILS # BLD AUTO: 2.55 10*3/MM3 (ref 1.7–7)
NEUTROPHILS NFR BLD AUTO: 56.2 % (ref 42.7–76)
NRBC BLD AUTO-RTO: 0 /100 WBC (ref 0–0.2)
PLATELET # BLD AUTO: 191 10*3/MM3 (ref 140–450)
RBC # BLD AUTO: 4.58 10*6/MM3 (ref 4.14–5.8)
TRIGL SERPL-MCNC: 69 MG/DL (ref 0–150)
VLDLC SERPL CALC-MCNC: 13.8 MG/DL
WBC # BLD AUTO: 4.54 10*3/MM3 (ref 3.4–10.8)

## 2019-04-26 PROCEDURE — 99214 OFFICE O/P EST MOD 30 MIN: CPT | Performed by: INTERNAL MEDICINE

## 2019-04-26 NOTE — PROGRESS NOTES
Subjective   Brian Barriga is a 63 y.o. male.     Chief Complaint   Patient presents with   • Hyperlipidemia   • Hypertension   • Coronary Artery Disease   • Atrial Fibrillation         Hyperlipidemia   This is a chronic problem. The current episode started more than 1 year ago. The problem is controlled. Recent lipid tests were reviewed and are normal. Factors aggravating his hyperlipidemia include beta blockers. Associated symptoms include chest pain. Pertinent negatives include no shortness of breath. Current antihyperlipidemic treatment includes statins. The current treatment provides significant improvement of lipids. There are no compliance problems.  Risk factors for coronary artery disease include dyslipidemia, hypertension and male sex.   Hypertension   This is a chronic problem. The current episode started more than 1 year ago. The problem is unchanged. The problem is controlled. Associated symptoms include chest pain and malaise/fatigue. Pertinent negatives include no headaches, palpitations, peripheral edema or shortness of breath. There are no associated agents to hypertension. Risk factors for coronary artery disease include dyslipidemia and male gender. Current antihypertension treatment includes beta blockers. The current treatment provides significant improvement. There are no compliance problems.  Hypertensive end-organ damage includes CAD/MI and CVA. There is no history of angina or kidney disease.   Coronary Artery Disease   Presents for follow-up visit. Symptoms include chest pain. Pertinent negatives include no chest pressure, chest tightness, leg swelling, palpitations or shortness of breath. Risk factors include hyperlipidemia. The symptoms have been resolved. Compliance with diet is good. Compliance with exercise is good. Compliance with medications is good.   Atrial Fibrillation   Presents for follow-up visit. Symptoms include chest pain. Symptoms are negative for palpitations and  shortness of breath. The symptoms have been stable. Past medical history includes atrial fibrillation, CAD and hyperlipidemia. There are no medication compliance problems.   Hyperglycemia   This is a chronic problem. The current episode started more than 1 month ago. Associated symptoms include abdominal pain (gerd), chest pain, chills and fatigue. Pertinent negatives include no coughing, fever, headaches, nausea or vomiting.        The following portions of the patient's history were reviewed and updated as appropriate: allergies, current medications, past social history and problem list.    Outpatient Medications Marked as Taking for the 4/26/19 encounter (Office Visit) with Robert Light MD   Medication Sig Dispense Refill   • aspirin 81 MG EC tablet Take 81 mg by mouth Daily.     • atorvastatin (LIPITOR) 40 MG tablet TAKE 1 TABLET BY MOUTH EVERY MORNING 90 tablet 0   • finasteride (PROSCAR) 5 MG tablet Take 1 tablet by mouth daily.     • folic acid (FOLVITE) 1 MG tablet TAKE 1 TABLET BY MOUTH DAILY 90 tablet 0   • levocetirizine (XYZAL) 5 MG tablet TAKE 1 TABLET BY MOUTH EVERY EVENING 90 tablet 0   • montelukast (SINGULAIR) 10 MG tablet Take 1 tablet by mouth Daily. 90 tablet 1   • Multiple Vitamin (MULTI VITAMIN DAILY PO) Take 1 tablet by mouth Daily.     • pantoprazole (PROTONIX) 40 MG EC tablet TAKE 1 TABLET BY MOUTH EVERY MORNING 30 MINUTES BEFORE BREAKFAST 90 tablet 0   • sildenafil (REVATIO) 20 MG tablet Take 20 mg by mouth As Needed.     • vitamin D (ERGOCALCIFEROL) 92963 units capsule capsule TAKE 1 CAPSULE BY MOUTH 1 TIME EVERY WEEK 12 capsule 0       Review of Systems   Constitutional: Positive for chills, fatigue and malaise/fatigue. Negative for fever.   Respiratory: Negative for cough, chest tightness, shortness of breath and wheezing.    Cardiovascular: Positive for chest pain. Negative for palpitations and leg swelling.   Gastrointestinal: Positive for abdominal pain (gerd). Negative for  constipation, diarrhea, nausea and vomiting.   Neurological: Negative for headaches.       Objective   Vitals:    04/26/19 0944   BP: 122/78   Pulse: 53   Resp: 16   Temp: 98.1 °F (36.7 °C)   SpO2: 98%          04/26/19 0944   Weight: 70.3 kg (155 lb)    [unfilled]  Body mass index is 22.88 kg/m².      Physical Exam   Constitutional: He appears well-developed and well-nourished. No distress.   HENT:   Head: Normocephalic and atraumatic.   Neck: Carotid bruit is not present. No thyromegaly present.   Cardiovascular: Normal rate, regular rhythm, normal heart sounds and intact distal pulses. Exam reveals no gallop.   No murmur heard.  Pulmonary/Chest: Effort normal and breath sounds normal. No respiratory distress. He has no wheezes. He has no rales.   Abdominal: Soft. Bowel sounds are normal. He exhibits no mass. There is no tenderness. There is no guarding.         Problem List Items Addressed This Visit        Cardiovascular and Mediastinum    Hypertension - Primary (Chronic)    Paroxysmal atrial fibrillation (CMS/HCC)    Hyperlipidemia    Coronary artery disease involving native coronary artery of native heart       Respiratory    Obstructive sleep apnea syndrome (Chronic)       Digestive    Gastroesophageal reflux disease (Chronic)        Assessment/Plan   In for recheck of multiple medical problems.  Hypertension, hyperlipidemia, CAD, PAF.  History of stroke and hypercoagulable state.  Blood pressure control is excellent.  Lipids are pending.  No arrhythmias.   Had annual lab work January 2019.  Gets lipids every 3 months.  Urologist checks his PSA and prostate exam. Had terrible fatigue 3 months ago.  Just had another sleep consultation and is getting ready to have his mouthguard evaluated.  Fatigue seems worse recently.  Mainly when it is sharp work.  Has no trouble with spin class and exercise.  His allergist once a CBC done and will get that today.    Procedures             Dragon disclaimer:   Much of  this encounter note is an electronic transcription/translation of spoken language to printed text. The electronic translation of spoken language may permit erroneous, or at times, nonsensical words or phrases to be inadvertently transcribed; Although I have reviewed the note for such errors, some may still exist.

## 2019-05-17 DIAGNOSIS — K21.9 GASTROESOPHAGEAL REFLUX DISEASE, ESOPHAGITIS PRESENCE NOT SPECIFIED: ICD-10-CM

## 2019-05-17 DIAGNOSIS — E78.5 HYPERLIPIDEMIA: ICD-10-CM

## 2019-05-17 RX ORDER — FOLIC ACID 1 MG/1
TABLET ORAL
Qty: 90 TABLET | Refills: 0 | Status: SHIPPED | OUTPATIENT
Start: 2019-05-17 | End: 2019-08-05 | Stop reason: SDUPTHER

## 2019-05-17 RX ORDER — PANTOPRAZOLE SODIUM 40 MG/1
TABLET, DELAYED RELEASE ORAL
Qty: 90 TABLET | Refills: 0 | Status: SHIPPED | OUTPATIENT
Start: 2019-05-17 | End: 2019-08-05 | Stop reason: SDUPTHER

## 2019-06-03 RX ORDER — ERGOCALCIFEROL 1.25 MG/1
CAPSULE ORAL
Qty: 12 CAPSULE | Refills: 0 | Status: SHIPPED | OUTPATIENT
Start: 2019-06-03 | End: 2019-08-27 | Stop reason: SDUPTHER

## 2019-07-08 DIAGNOSIS — J30.1 ALLERGIC RHINITIS DUE TO POLLEN: ICD-10-CM

## 2019-07-08 RX ORDER — LEVOCETIRIZINE DIHYDROCHLORIDE 5 MG/1
TABLET, FILM COATED ORAL
Qty: 90 TABLET | Refills: 0 | Status: SHIPPED | OUTPATIENT
Start: 2019-07-08 | End: 2019-08-05 | Stop reason: SDUPTHER

## 2019-07-08 RX ORDER — ATORVASTATIN CALCIUM 40 MG/1
TABLET, FILM COATED ORAL
Qty: 90 TABLET | Refills: 0 | Status: SHIPPED | OUTPATIENT
Start: 2019-07-08 | End: 2019-08-05 | Stop reason: SDUPTHER

## 2019-08-01 ENCOUNTER — OFFICE VISIT (OUTPATIENT)
Dept: INTERNAL MEDICINE | Facility: CLINIC | Age: 64
End: 2019-08-01

## 2019-08-01 VITALS
BODY MASS INDEX: 22.9 KG/M2 | DIASTOLIC BLOOD PRESSURE: 72 MMHG | HEART RATE: 65 BPM | SYSTOLIC BLOOD PRESSURE: 118 MMHG | WEIGHT: 154.6 LBS | RESPIRATION RATE: 16 BRPM | HEIGHT: 69 IN | TEMPERATURE: 98.9 F | OXYGEN SATURATION: 98 %

## 2019-08-01 DIAGNOSIS — J45.20 MILD INTERMITTENT ASTHMA WITHOUT COMPLICATION: ICD-10-CM

## 2019-08-01 DIAGNOSIS — D68.59 HYPERCOAGULABLE STATE (HCC): Chronic | ICD-10-CM

## 2019-08-01 DIAGNOSIS — E78.5 HYPERLIPIDEMIA, UNSPECIFIED HYPERLIPIDEMIA TYPE: ICD-10-CM

## 2019-08-01 DIAGNOSIS — I25.10 CORONARY ARTERY DISEASE INVOLVING NATIVE CORONARY ARTERY OF NATIVE HEART WITHOUT ANGINA PECTORIS: ICD-10-CM

## 2019-08-01 DIAGNOSIS — I10 ESSENTIAL HYPERTENSION: Primary | Chronic | ICD-10-CM

## 2019-08-01 DIAGNOSIS — I48.0 PAROXYSMAL ATRIAL FIBRILLATION (HCC): ICD-10-CM

## 2019-08-01 DIAGNOSIS — K21.9 GASTROESOPHAGEAL REFLUX DISEASE WITHOUT ESOPHAGITIS: Chronic | ICD-10-CM

## 2019-08-01 PROBLEM — Z15.89 HETEROZYGOUS FOR MTHFR GENE MUTATION: Status: ACTIVE | Noted: 2019-08-01

## 2019-08-01 LAB
CHOLEST SERPL-MCNC: 127 MG/DL (ref 0–200)
HDLC SERPL-MCNC: 54 MG/DL (ref 40–60)
LDLC SERPL CALC-MCNC: 57 MG/DL (ref 0–100)
TRIGL SERPL-MCNC: 80 MG/DL (ref 0–150)
VLDLC SERPL CALC-MCNC: 16 MG/DL

## 2019-08-01 PROCEDURE — 99214 OFFICE O/P EST MOD 30 MIN: CPT | Performed by: INTERNAL MEDICINE

## 2019-08-01 NOTE — PROGRESS NOTES
Subjective   Brian Barriga is a 64 y.o. male.     Chief Complaint   Patient presents with   • Hypertension   • Hyperlipidemia   • Coronary Artery Disease         Hypertension   This is a chronic problem. The current episode started more than 1 year ago. The problem is unchanged. The problem is controlled. Associated symptoms include chest pain and malaise/fatigue. Pertinent negatives include no headaches, palpitations, peripheral edema or shortness of breath. There are no associated agents to hypertension. Risk factors for coronary artery disease include dyslipidemia and male gender. Current antihypertension treatment includes beta blockers. The current treatment provides significant improvement. There are no compliance problems.  Hypertensive end-organ damage includes CAD/MI and CVA. There is no history of angina or kidney disease.   Hyperlipidemia   This is a chronic problem. The current episode started more than 1 year ago. The problem is controlled. Recent lipid tests were reviewed and are normal. Factors aggravating his hyperlipidemia include beta blockers. Associated symptoms include chest pain. Pertinent negatives include no shortness of breath. Current antihyperlipidemic treatment includes statins. The current treatment provides significant improvement of lipids. There are no compliance problems.  Risk factors for coronary artery disease include dyslipidemia, hypertension and male sex.   Coronary Artery Disease   Presents for follow-up visit. Symptoms include chest pain. Pertinent negatives include no chest pressure, chest tightness, leg swelling, palpitations or shortness of breath. Risk factors include hyperlipidemia. The symptoms have been resolved. Compliance with diet is good. Compliance with exercise is good. Compliance with medications is good.   Atrial Fibrillation   Presents for follow-up visit. Symptoms include chest pain. Symptoms are negative for palpitations and shortness of breath. The  symptoms have been stable. Past medical history includes atrial fibrillation, CAD and hyperlipidemia. There are no medication compliance problems.   Hyperglycemia   This is a chronic problem. The current episode started more than 1 month ago. Associated symptoms include abdominal pain (gerd), chest pain, chills and fatigue. Pertinent negatives include no coughing, fever, headaches, nausea or vomiting.        The following portions of the patient's history were reviewed and updated as appropriate: allergies, current medications, past social history and problem list.    Outpatient Medications Marked as Taking for the 8/1/19 encounter (Office Visit) with Robert Light MD   Medication Sig Dispense Refill   • aspirin 81 MG EC tablet Take 81 mg by mouth Daily.     • atorvastatin (LIPITOR) 40 MG tablet TAKE 1 TABLET BY MOUTH EVERY MORNING 90 tablet 0   • finasteride (PROSCAR) 5 MG tablet Take 1 tablet by mouth daily.     • folic acid (FOLVITE) 1 MG tablet TAKE 1 TABLET BY MOUTH DAILY 90 tablet 0   • levocetirizine (XYZAL) 5 MG tablet TAKE 1 TABLET BY MOUTH EVERY EVENING 90 tablet 0   • montelukast (SINGULAIR) 10 MG tablet Take 1 tablet by mouth Daily. 90 tablet 1   • Multiple Vitamin (MULTI VITAMIN DAILY PO) Take 1 tablet by mouth Daily.     • pantoprazole (PROTONIX) 40 MG EC tablet TAKE 1 TABLET BY MOUTH EVERY MORNING 30 MINUTES BEFORE BREAKFAST 90 tablet 0   • sildenafil (REVATIO) 20 MG tablet Take 20 mg by mouth As Needed.     • vitamin D (ERGOCALCIFEROL) 54867 units capsule capsule TAKE 1 CAPSULE BY MOUTH 1 TIME EVERY WEEK 12 capsule 0       Review of Systems   Constitutional: Positive for chills, fatigue and malaise/fatigue. Negative for fever.   Respiratory: Negative for cough, chest tightness, shortness of breath and wheezing.    Cardiovascular: Positive for chest pain. Negative for palpitations and leg swelling.   Gastrointestinal: Positive for abdominal pain (gerd). Negative for constipation, diarrhea, nausea  and vomiting.   Neurological: Negative for headaches.       Objective   Vitals:    08/01/19 0918   BP: 118/72   Pulse: 65   Resp: 16   Temp: 98.9 °F (37.2 °C)   SpO2: 98%          08/01/19 0918   Weight: 70.1 kg (154 lb 9.6 oz)    [unfilled]  Body mass index is 22.82 kg/m².      Physical Exam   Constitutional: He appears well-developed and well-nourished. No distress.   HENT:   Head: Normocephalic and atraumatic.   Neck: Carotid bruit is not present. No thyromegaly present.   Cardiovascular: Normal rate, regular rhythm, normal heart sounds and intact distal pulses. Exam reveals no gallop.   No murmur heard.  Pulmonary/Chest: Effort normal and breath sounds normal. No respiratory distress. He has no wheezes. He has no rales.   Abdominal: Soft. Bowel sounds are normal. He exhibits no mass. There is no tenderness. There is no guarding.         Problem List Items Addressed This Visit        Cardiovascular and Mediastinum    Coronary artery disease involving native coronary artery of native heart    Hyperlipidemia    Hypertension - Primary (Chronic)    Paroxysmal atrial fibrillation (CMS/HCC)       Respiratory    Asthma       Digestive    Gastroesophageal reflux disease (Chronic)       Hematopoietic and Hemostatic    Hypercoagulable state (CMS/HCC) (Chronic)        Assessment/Plan   In for recheck of multiple medical problems.  Hypertension, hyperlipidemia, CAD, PAF.  History of stroke and hypercoagulable state.  Blood pressure control is excellent.  Lipids are pending.  No arrhythmias.   Had annual lab work January 2019.  Gets lipids every 3 months.  Urologist checks his PSA and prostate exam. Had terrible fatigue 3 months ago.  Just had another sleep consultation and is getting ready to have his mouthguard evaluated.  Has no trouble with spin class and exercise.  Fatigue is still present but pretty mild right now.  Stopped his Plavix about 9 months ago.  His coronary stent was about 4 years ago.  Due for Shingrix.   He thinks he has had some type of pneumovax vaccine in the past and he will get his records on that.  He has Medicare but is a federal plan so his preventive exam should be covered in the future.    Procedures             Dragon disclaimer:   Much of this encounter note is an electronic transcription/translation of spoken language to printed text. The electronic translation of spoken language may permit erroneous, or at times, nonsensical words or phrases to be inadvertently transcribed; Although I have reviewed the note for such errors, some may still exist.

## 2019-08-05 DIAGNOSIS — K21.9 GASTROESOPHAGEAL REFLUX DISEASE, ESOPHAGITIS PRESENCE NOT SPECIFIED: ICD-10-CM

## 2019-08-05 DIAGNOSIS — J30.1 ALLERGIC RHINITIS DUE TO POLLEN: ICD-10-CM

## 2019-08-05 DIAGNOSIS — E78.5 HYPERLIPIDEMIA: ICD-10-CM

## 2019-08-05 RX ORDER — PANTOPRAZOLE SODIUM 40 MG/1
TABLET, DELAYED RELEASE ORAL
Qty: 90 TABLET | Refills: 0 | Status: SHIPPED | OUTPATIENT
Start: 2019-08-05 | End: 2019-12-05 | Stop reason: SDUPTHER

## 2019-08-05 RX ORDER — LEVOCETIRIZINE DIHYDROCHLORIDE 5 MG/1
TABLET, FILM COATED ORAL
Qty: 90 TABLET | Refills: 0 | Status: SHIPPED | OUTPATIENT
Start: 2019-08-05 | End: 2020-01-06

## 2019-08-05 RX ORDER — ATORVASTATIN CALCIUM 40 MG/1
TABLET, FILM COATED ORAL
Qty: 90 TABLET | Refills: 0 | Status: SHIPPED | OUTPATIENT
Start: 2019-08-05 | End: 2020-04-28

## 2019-08-05 RX ORDER — FOLIC ACID 1 MG/1
TABLET ORAL
Qty: 90 TABLET | Refills: 0 | Status: SHIPPED | OUTPATIENT
Start: 2019-08-05 | End: 2019-09-06 | Stop reason: SDUPTHER

## 2019-08-27 RX ORDER — ERGOCALCIFEROL 1.25 MG/1
CAPSULE ORAL
Qty: 12 CAPSULE | Refills: 0 | Status: SHIPPED | OUTPATIENT
Start: 2019-08-27 | End: 2019-11-13 | Stop reason: SDUPTHER

## 2019-09-06 DIAGNOSIS — E78.5 HYPERLIPIDEMIA: ICD-10-CM

## 2019-09-06 RX ORDER — FOLIC ACID 1 MG/1
TABLET ORAL
Qty: 90 TABLET | Refills: 0 | Status: SHIPPED | OUTPATIENT
Start: 2019-09-06 | End: 2020-07-02

## 2019-11-01 ENCOUNTER — OFFICE VISIT (OUTPATIENT)
Dept: INTERNAL MEDICINE | Facility: CLINIC | Age: 64
End: 2019-11-01

## 2019-11-01 DIAGNOSIS — Z01.89 LABORATORY PROCEDURE: Primary | ICD-10-CM

## 2019-11-02 LAB
CHOLEST SERPL-MCNC: 134 MG/DL (ref 0–200)
HDLC SERPL-MCNC: 52 MG/DL (ref 40–60)
LDLC SERPL CALC-MCNC: 67 MG/DL (ref 0–100)
TRIGL SERPL-MCNC: 77 MG/DL (ref 0–150)
VLDLC SERPL CALC-MCNC: 15.4 MG/DL (ref 5–40)

## 2019-11-06 ENCOUNTER — OFFICE VISIT (OUTPATIENT)
Dept: INTERNAL MEDICINE | Facility: CLINIC | Age: 64
End: 2019-11-06

## 2019-11-06 VITALS
HEIGHT: 69 IN | TEMPERATURE: 98.1 F | HEART RATE: 53 BPM | RESPIRATION RATE: 16 BRPM | BODY MASS INDEX: 23.25 KG/M2 | SYSTOLIC BLOOD PRESSURE: 122 MMHG | OXYGEN SATURATION: 99 % | DIASTOLIC BLOOD PRESSURE: 82 MMHG | WEIGHT: 157 LBS

## 2019-11-06 DIAGNOSIS — N40.0 BENIGN PROSTATIC HYPERPLASIA, UNSPECIFIED WHETHER LOWER URINARY TRACT SYMPTOMS PRESENT: ICD-10-CM

## 2019-11-06 DIAGNOSIS — I25.10 CORONARY ARTERY DISEASE INVOLVING NATIVE CORONARY ARTERY OF NATIVE HEART WITHOUT ANGINA PECTORIS: ICD-10-CM

## 2019-11-06 DIAGNOSIS — Z79.899 MEDICATION MANAGEMENT: ICD-10-CM

## 2019-11-06 DIAGNOSIS — F33.8 SEASONAL AFFECTIVE DISORDER (HCC): ICD-10-CM

## 2019-11-06 DIAGNOSIS — Z15.89 HETEROZYGOUS FOR MTHFR GENE MUTATION: ICD-10-CM

## 2019-11-06 DIAGNOSIS — I10 ESSENTIAL HYPERTENSION: Primary | Chronic | ICD-10-CM

## 2019-11-06 DIAGNOSIS — E78.5 HYPERLIPIDEMIA, UNSPECIFIED HYPERLIPIDEMIA TYPE: ICD-10-CM

## 2019-11-06 DIAGNOSIS — Z00.00 WELCOME TO MEDICARE PREVENTIVE VISIT: ICD-10-CM

## 2019-11-06 DIAGNOSIS — I48.0 PAROXYSMAL ATRIAL FIBRILLATION (HCC): ICD-10-CM

## 2019-11-06 PROCEDURE — G0402 INITIAL PREVENTIVE EXAM: HCPCS | Performed by: INTERNAL MEDICINE

## 2019-11-06 PROCEDURE — 99214 OFFICE O/P EST MOD 30 MIN: CPT | Performed by: INTERNAL MEDICINE

## 2019-11-06 PROCEDURE — G0403 EKG FOR INITIAL PREVENT EXAM: HCPCS | Performed by: INTERNAL MEDICINE

## 2019-11-06 NOTE — PROGRESS NOTES
Subjective   Brian Barriga is a 64 y.o. male.     Chief Complaint   Patient presents with   • Hypertension   • Hyperlipidemia   • Coronary Artery Disease   • Atrial Fibrillation         History of MT HFR gene mutation.  History of NSAID which is been stable.      Hypertension   This is a chronic problem. The current episode started more than 1 year ago. The problem is unchanged. The problem is controlled. Associated symptoms include chest pain and malaise/fatigue. Pertinent negatives include no headaches, palpitations, peripheral edema or shortness of breath. There are no associated agents to hypertension. Risk factors for coronary artery disease include dyslipidemia and male gender. Current antihypertension treatment includes beta blockers. The current treatment provides significant improvement. There are no compliance problems.  Hypertensive end-organ damage includes CAD/MI and CVA. There is no history of angina or kidney disease.   Hyperlipidemia   This is a chronic problem. The current episode started more than 1 year ago. The problem is controlled. Recent lipid tests were reviewed and are normal. Factors aggravating his hyperlipidemia include beta blockers. Associated symptoms include chest pain. Pertinent negatives include no shortness of breath. Current antihyperlipidemic treatment includes statins. The current treatment provides significant improvement of lipids. There are no compliance problems.  Risk factors for coronary artery disease include dyslipidemia, hypertension and male sex.   Coronary Artery Disease   Presents for follow-up visit. Symptoms include chest pain. Pertinent negatives include no chest pressure, chest tightness, leg swelling, palpitations or shortness of breath. Risk factors include hyperlipidemia. The symptoms have been resolved. Compliance with diet is good. Compliance with exercise is good. Compliance with medications is good.   Atrial Fibrillation   Presents for follow-up visit.  Symptoms include chest pain. Symptoms are negative for palpitations and shortness of breath. The symptoms have been stable. Past medical history includes atrial fibrillation, CAD and hyperlipidemia. There are no medication compliance problems.   Hyperglycemia   This is a chronic problem. The current episode started more than 1 month ago. Associated symptoms include abdominal pain (gerd), chest pain, chills and fatigue. Pertinent negatives include no coughing, fever, headaches, nausea or vomiting.        The following portions of the patient's history were reviewed and updated as appropriate: allergies, current medications, past social history and problem list.    Outpatient Medications Marked as Taking for the 11/6/19 encounter (Office Visit) with Robert Light MD   Medication Sig Dispense Refill   • aspirin 81 MG EC tablet Take 81 mg by mouth Daily.     • atorvastatin (LIPITOR) 40 MG tablet TAKE 1 TABLET BY MOUTH EVERY MORNING 90 tablet 0   • finasteride (PROSCAR) 5 MG tablet Take 1 tablet by mouth daily.     • folic acid (FOLVITE) 1 MG tablet TAKE 1 TABLET BY MOUTH DAILY 90 tablet 0   • levocetirizine (XYZAL) 5 MG tablet TAKE 1 TABLET BY MOUTH EVERY EVENING 90 tablet 0   • montelukast (SINGULAIR) 10 MG tablet Take 1 tablet by mouth Daily. 90 tablet 1   • Multiple Vitamin (MULTI VITAMIN DAILY PO) Take 1 tablet by mouth Daily.     • pantoprazole (PROTONIX) 40 MG EC tablet TAKE 1 TABLET BY MOUTH EVERY MORNING 30 MINUTES BEFORE BREAKFAST 90 tablet 0   • sildenafil (REVATIO) 20 MG tablet Take 20 mg by mouth As Needed.     • vitamin D (ERGOCALCIFEROL) 13216 units capsule capsule TAKE 1 CAPSULE BY MOUTH 1 TIME EVERY WEEK 12 capsule 0       Review of Systems   Constitutional: Positive for chills, fatigue and malaise/fatigue. Negative for fever.   Respiratory: Negative for cough, chest tightness, shortness of breath and wheezing.    Cardiovascular: Positive for chest pain. Negative for palpitations and leg swelling.    Gastrointestinal: Positive for abdominal pain (gerd). Negative for constipation, diarrhea, nausea and vomiting.   Neurological: Negative for headaches.       Objective   Vitals:    11/06/19 1038   BP: 122/82   Pulse: 53   Resp: 16   Temp: 98.1 °F (36.7 °C)   SpO2: 99%          11/06/19  1038   Weight: 71.2 kg (157 lb)    [unfilled]  Body mass index is 23.17 kg/m².      Physical Exam   Constitutional: He appears well-developed and well-nourished. No distress.   HENT:   Head: Normocephalic and atraumatic.   Neck: Carotid bruit is not present. No thyromegaly present.   Cardiovascular: Normal rate, regular rhythm, normal heart sounds and intact distal pulses. Exam reveals no gallop.   No murmur heard.  Pulmonary/Chest: Effort normal and breath sounds normal. No respiratory distress. He has no wheezes. He has no rales.   Abdominal: Soft. Bowel sounds are normal. He exhibits no mass. There is no tenderness. There is no guarding.         Problem List Items Addressed This Visit        Cardiovascular and Mediastinum    Coronary artery disease involving native coronary artery of native heart    Hyperlipidemia    Hypertension - Primary (Chronic)    Paroxysmal atrial fibrillation (CMS/HCC)        Assessment/Plan   In for recheck of multiple medical problems.  Hypertension, hyperlipidemia, CAD, PAF.  History of stroke and hypercoagulable state.  Blood pressure control is excellent.  Lipids are excelllent.  No arrhythmias.   Had annual lab work January 2019.  Gets lipids every 3 months.  Urologist checks his PSA and prostate exam. Had terrible fatigue 3 months ago.  Has no trouble with spin class and exercise.  Fatigue is still present but pretty mild right now.  Due for Shingrix.  He thinks he has had some type of pneumovax vaccine in the past and he will get his records on that.  He has Medicare but is a federal plan so his preventive exam should be covered in the future.  He will be due for Prevnar 13 in about 3 weeks.   That will be 1 month from his flu shot.  Pneumovax 1 year later.  He knows to space immunizations a month apart regarding his Shingrix.  SAT is stable.  History of MTHFR gene mutation.               Dragon disclaimer:   Much of this encounter note is an electronic transcription/translation of spoken language to printed text. The electronic translation of spoken language may permit erroneous, or at times, nonsensical words or phrases to be inadvertently transcribed; Although I have reviewed the note for such errors, some may still exist.

## 2019-11-06 NOTE — PROGRESS NOTES
The ABCs of the Annual Wellness Visit  [facility]    Chief Complaint   Patient presents with   • Hypertension   • Hyperlipidemia   • Coronary Artery Disease   • Atrial Fibrillation       Subjective   History of Present Illness:  Brian Barriga is a 64 y.o. male who presents for a  Welcome to Medicare Visit.    HEALTH RISK ASSESSMENT    Recent Hospitalizations:  No hospitalization(s) within the last year.    Current Medical Providers:  Patient Care Team:  Robert Light MD as PCP - General (Internal Medicine)  Robert Light MD as PCP - Internal Medicine (Internal Medicine)  Darrell Mosqueda MD as Consulting Physician (Cardiology)  Pete Reis MD as Consulting Physician (Allergy and Immunology)    Smoking Status:  Social History     Tobacco Use   Smoking Status Never Smoker   Smokeless Tobacco Never Used       Alcohol Consumption:  Social History     Substance and Sexual Activity   Alcohol Use No       Depression Screen:   PHQ-2/PHQ-9 Depression Screening 11/6/2019   Little interest or pleasure in doing things 3   Feeling down, depressed, or hopeless 0   Trouble falling or staying asleep, or sleeping too much 0   Feeling tired or having little energy 0   Poor appetite or overeating 0   Feeling bad about yourself - or that you are a failure or have let yourself or your family down 0   Trouble concentrating on things, such as reading the newspaper or watching television 1   Moving or speaking so slowly that other people could have noticed. Or the opposite - being so fidgety or restless that you have been moving around a lot more than usual 0   Thoughts that you would be better off dead, or of hurting yourself in some way 0   Total Score 4   If you checked off any problems, how difficult have these problems made it for you to do your work, take care of things at home, or get along with other people? Not difficult at all       Fall Risk Screen:  STEADI Fall Risk Assessment was completed, and  patient is at LOW risk for falls.Assessment completed on:11/6/2019    Health Habits and Functional and Cognitive Screening:  Functional & Cognitive Status 11/6/2019   Do you have difficulty preparing food and eating? No   Do you have difficulty bathing yourself, getting dressed or grooming yourself? No   Do you have difficulty using the toilet? No   Do you have difficulty moving around from place to place? No   Do you have trouble with steps or getting out of a bed or a chair? No   Current Diet Unhealthy Diet   Dental Exam Up to date   Eye Exam Up to date   Exercise (times per week) 7 times per week   Current Exercise Activities Include Swimming   Do you need help using the phone?  No   Are you deaf or do you have serious difficulty hearing?  Yes   Do you need help with transportation? No   Do you need help shopping? No   Do you need help preparing meals?  No   Do you need help with housework?  No   Do you need help with laundry? No   Do you need help taking your medications? No   Do you need help managing money? No   Do you ever drive or ride in a car without wearing a seat belt? No   Have you felt unusual stress, anger or loneliness in the last month? No   Who do you live with? Alone   If you need help, do you have trouble finding someone available to you? No   Have you been bothered in the last four weeks by sexual problems? No   Do you have difficulty concentrating, remembering or making decisions? No         Does the patient have evidence of cognitive impairment? No    Asprin use counseling:Taking ASA appropriately as indicated    Visual Acuity:     Visual Acuity Screening    Right eye Left eye Both eyes   Without correction:      With correction: 20/40 20/200 20/40       Age-appropriate Screening Schedule:  Refer to the list below for future screening recommendations based on patient's age, sex and/or medical conditions. Orders for these recommended tests are listed in the plan section. The patient has been  provided with a written plan.    Health Maintenance   Topic Date Due   • ZOSTER VACCINE (2 of 3) 02/24/2014   • PNEUMOCOCCAL VACCINES (65+ LOW/MEDIUM RISK) (1 of 2 - PCV13) 07/01/2019   • LIPID PANEL  11/01/2020   • COLONOSCOPY  01/01/2025   • TDAP/TD VACCINES (2 - Td) 07/25/2028   • INFLUENZA VACCINE  Completed          The following portions of the patient's history were reviewed and updated as appropriate: allergies, current medications, past family history, past medical history, past social history, past surgical history and problem list.    Outpatient Medications Prior to Visit   Medication Sig Dispense Refill   • aspirin 81 MG EC tablet Take 81 mg by mouth Daily.     • atorvastatin (LIPITOR) 40 MG tablet TAKE 1 TABLET BY MOUTH EVERY MORNING 90 tablet 0   • finasteride (PROSCAR) 5 MG tablet Take 1 tablet by mouth daily.     • folic acid (FOLVITE) 1 MG tablet TAKE 1 TABLET BY MOUTH DAILY 90 tablet 0   • levocetirizine (XYZAL) 5 MG tablet TAKE 1 TABLET BY MOUTH EVERY EVENING 90 tablet 0   • montelukast (SINGULAIR) 10 MG tablet Take 1 tablet by mouth Daily. 90 tablet 1   • Multiple Vitamin (MULTI VITAMIN DAILY PO) Take 1 tablet by mouth Daily.     • pantoprazole (PROTONIX) 40 MG EC tablet TAKE 1 TABLET BY MOUTH EVERY MORNING 30 MINUTES BEFORE BREAKFAST 90 tablet 0   • sildenafil (REVATIO) 20 MG tablet Take 20 mg by mouth As Needed.     • vitamin D (ERGOCALCIFEROL) 89409 units capsule capsule TAKE 1 CAPSULE BY MOUTH 1 TIME EVERY WEEK 12 capsule 0     No facility-administered medications prior to visit.        Patient Active Problem List   Diagnosis   • Allergic rhinitis due to pollen   • Asthma   • Gastroesophageal reflux disease   • Hypertension   • Obstructive sleep apnea syndrome   • Paroxysmal atrial fibrillation (CMS/HCC)   • Hypercoagulable state (CMS/HCC)   • Vitamin D deficiency   • Hyperlipidemia   • Coronary artery disease involving native coronary artery of native heart   • Stroke syndrome   • Benign  "prostatic hypertrophy   • Intraductal papillary mucinous neoplasm   • Deficiency of clotting factor (CMS/HCC)   • Anemia   • TMJ (temporomandibular joint disorder)   • Seasonal affective disorder (CMS/HCC)   • Heterozygous for MTHFR gene mutation (CMS/HCC)       Advanced Care Planning:  Patient has an advance directive - a copy has not been provided. Have asked the patient to send this to us to add to record    Review of Systems    Compared to one year ago, the patient feels his physical health is the same.  Compared to one year ago, the patient feels his mental health is the same.    Reviewed chart for potential of high risk medication in the elderly: yes  Reviewed chart for potential of harmful drug interactions in the elderly:yes    Objective         Vitals:    11/06/19 1038   BP: 122/82   Pulse: 53   Resp: 16   Temp: 98.1 °F (36.7 °C)   TempSrc: Oral   SpO2: 99%   Weight: 71.2 kg (157 lb)   Height: 175.3 cm (69.02\")   PainSc: 0-No pain       Body mass index is 23.17 kg/m².  Discussed the patient's BMI with him. The BMI is in the acceptable range.    Physical Exam    Lab Results   Component Value Date    CHLPL 134 11/01/2019    TRIG 77 11/01/2019    HDL 52 11/01/2019    LDL 67 11/01/2019    VLDL 15.4 11/01/2019          ECG 12 Lead  Date/Time: 11/6/2019 11:54 AM  Performed by: Robert Light MD  Authorized by: Robert Light MD   Comparison: compared with previous ECG from 7/19/2018  Similar to previous ECG  Rhythm: sinus bradycardia  Rate: bradycardic  Conduction: conduction normal  ST Segments: ST segments normal  T Waves: T waves normal  QRS axis: normal  Other: no other findings    Clinical impression: normal ECG  Comments: Sinus bradycardia.  Heart rate is 42.  This is a normal EKG.  There is no change from the prior EKG dated 7/19/2018.              Assessment/Plan   Medicare Risks and Personalized Health Plan  CMS Preventative Services Quick Reference  Advance Directive Discussion    The above " risks/problems have been discussed with the patient.  Pertinent information has been shared with the patient in the After Visit Summary.  Follow up plans and orders are seen below in the Assessment/Plan Section.    There are no diagnoses linked to this encounter.    Follow Up:  No Follow-up on file.     An After Visit Summary and PPPS were given to the patient.

## 2019-11-13 RX ORDER — ERGOCALCIFEROL 1.25 MG/1
CAPSULE ORAL
Qty: 13 CAPSULE | Refills: 3 | Status: SHIPPED | OUTPATIENT
Start: 2019-11-13 | End: 2020-11-02

## 2019-12-05 DIAGNOSIS — K21.9 GASTROESOPHAGEAL REFLUX DISEASE, ESOPHAGITIS PRESENCE NOT SPECIFIED: ICD-10-CM

## 2019-12-05 RX ORDER — PANTOPRAZOLE SODIUM 40 MG/1
TABLET, DELAYED RELEASE ORAL
Qty: 90 TABLET | Refills: 0 | Status: SHIPPED | OUTPATIENT
Start: 2019-12-05 | End: 2020-03-02

## 2020-01-04 DIAGNOSIS — J30.1 ALLERGIC RHINITIS DUE TO POLLEN: ICD-10-CM

## 2020-01-06 RX ORDER — LEVOCETIRIZINE DIHYDROCHLORIDE 5 MG/1
TABLET, FILM COATED ORAL
Qty: 90 TABLET | Refills: 0 | Status: SHIPPED | OUTPATIENT
Start: 2020-01-06

## 2020-02-06 ENCOUNTER — OFFICE VISIT (OUTPATIENT)
Dept: INTERNAL MEDICINE | Facility: CLINIC | Age: 65
End: 2020-02-06

## 2020-02-06 VITALS
BODY MASS INDEX: 23.37 KG/M2 | RESPIRATION RATE: 16 BRPM | TEMPERATURE: 97.6 F | HEART RATE: 65 BPM | OXYGEN SATURATION: 99 % | SYSTOLIC BLOOD PRESSURE: 116 MMHG | HEIGHT: 69 IN | WEIGHT: 157.8 LBS | DIASTOLIC BLOOD PRESSURE: 70 MMHG

## 2020-02-06 DIAGNOSIS — I48.0 PAROXYSMAL ATRIAL FIBRILLATION (HCC): ICD-10-CM

## 2020-02-06 DIAGNOSIS — F33.8 SEASONAL AFFECTIVE DISORDER (HCC): ICD-10-CM

## 2020-02-06 DIAGNOSIS — D68.59 HYPERCOAGULABLE STATE (HCC): ICD-10-CM

## 2020-02-06 DIAGNOSIS — I10 ESSENTIAL HYPERTENSION: Primary | Chronic | ICD-10-CM

## 2020-02-06 DIAGNOSIS — E78.5 HYPERLIPIDEMIA, UNSPECIFIED HYPERLIPIDEMIA TYPE: ICD-10-CM

## 2020-02-06 DIAGNOSIS — I25.10 CORONARY ARTERY DISEASE INVOLVING NATIVE CORONARY ARTERY OF NATIVE HEART WITHOUT ANGINA PECTORIS: ICD-10-CM

## 2020-02-06 DIAGNOSIS — Z15.89 HETEROZYGOUS FOR MTHFR GENE MUTATION: ICD-10-CM

## 2020-02-06 PROCEDURE — 99214 OFFICE O/P EST MOD 30 MIN: CPT | Performed by: INTERNAL MEDICINE

## 2020-02-06 RX ORDER — VALACYCLOVIR HYDROCHLORIDE 1 G/1
TABLET, FILM COATED ORAL
COMMUNITY
End: 2020-02-06 | Stop reason: SDUPTHER

## 2020-02-06 RX ORDER — VALACYCLOVIR HYDROCHLORIDE 1 G/1
2000 TABLET, FILM COATED ORAL 2 TIMES DAILY
Qty: 20 TABLET | Refills: 0 | Status: SHIPPED | OUTPATIENT
Start: 2020-02-06 | End: 2020-02-07

## 2020-02-06 NOTE — PROGRESS NOTES
Subjective   Brian Barriga is a 64 y.o. male.     Chief Complaint   Patient presents with   • Hypertension   • Hyperlipidemia   • Coronary Artery Disease   • Atrial Fibrillation         History of MTHFR gene mutation.  History of NSAID which is been stable.    Hypertension   This is a chronic problem. The current episode started more than 1 year ago. The problem is unchanged. The problem is controlled. Associated symptoms include chest pain and malaise/fatigue. Pertinent negatives include no headaches, palpitations, peripheral edema or shortness of breath. There are no associated agents to hypertension. Risk factors for coronary artery disease include dyslipidemia and male gender. Current antihypertension treatment includes beta blockers. The current treatment provides significant improvement. There are no compliance problems.  Hypertensive end-organ damage includes CAD/MI and CVA. There is no history of angina or kidney disease.   Hyperlipidemia   This is a chronic problem. The current episode started more than 1 year ago. The problem is controlled. Recent lipid tests were reviewed and are normal. Factors aggravating his hyperlipidemia include beta blockers. Associated symptoms include chest pain. Pertinent negatives include no shortness of breath. Current antihyperlipidemic treatment includes statins. The current treatment provides significant improvement of lipids. There are no compliance problems.  Risk factors for coronary artery disease include dyslipidemia, hypertension and male sex.   Coronary Artery Disease   Presents for follow-up visit. Symptoms include chest pain. Pertinent negatives include no chest pressure, chest tightness, leg swelling, palpitations or shortness of breath. Risk factors include hyperlipidemia. The symptoms have been resolved. Compliance with diet is good. Compliance with exercise is good. Compliance with medications is good.   Atrial Fibrillation   Presents for follow-up visit.  Symptoms include chest pain. Symptoms are negative for palpitations and shortness of breath. The symptoms have been stable. Past medical history includes atrial fibrillation, CAD and hyperlipidemia. There are no medication compliance problems.   Hyperglycemia   This is a chronic problem. The current episode started more than 1 month ago. Associated symptoms include abdominal pain (gerd), chest pain, chills and fatigue. Pertinent negatives include no coughing, fever, headaches, nausea or vomiting.        The following portions of the patient's history were reviewed and updated as appropriate: allergies, current medications, past social history and problem list.    Outpatient Medications Marked as Taking for the 2/6/20 encounter (Office Visit) with Robert Light MD   Medication Sig Dispense Refill   • aspirin 81 MG EC tablet Take 81 mg by mouth Daily.     • atorvastatin (LIPITOR) 40 MG tablet TAKE 1 TABLET BY MOUTH EVERY MORNING 90 tablet 0   • finasteride (PROSCAR) 5 MG tablet Take 1 tablet by mouth daily.     • folic acid (FOLVITE) 1 MG tablet TAKE 1 TABLET BY MOUTH DAILY 90 tablet 0   • levocetirizine (XYZAL) 5 MG tablet TAKE 1 TABLET BY MOUTH EVERY EVENING 90 tablet 0   • montelukast (SINGULAIR) 10 MG tablet Take 1 tablet by mouth Daily. 90 tablet 1   • Multiple Vitamin (MULTI VITAMIN DAILY PO) Take 1 tablet by mouth Daily.     • pantoprazole (PROTONIX) 40 MG EC tablet TAKE 1 TABLET BY MOUTH EVERY MORNING 30 MINUTES BEFORE BREAKFAST 90 tablet 0   • sildenafil (REVATIO) 20 MG tablet Take 20 mg by mouth As Needed.     • vitamin D (ERGOCALCIFEROL) 1.25 MG (76847 UT) capsule capsule TAKE 1 CAPSULE BY MOUTH 1 TIME EVERY WEEK 13 capsule 3       Review of Systems   Constitutional: Positive for chills, fatigue and malaise/fatigue. Negative for fever.   Respiratory: Negative for cough, chest tightness, shortness of breath and wheezing.    Cardiovascular: Positive for chest pain. Negative for palpitations and leg  swelling.   Gastrointestinal: Positive for abdominal pain (gerd). Negative for constipation, diarrhea, nausea and vomiting.   Neurological: Negative for headaches.       Objective   Vitals:    02/06/20 1033   BP: 116/70   Pulse: 65   Resp: 16   Temp: 97.6 °F (36.4 °C)   SpO2: 99%          02/06/20  1033   Weight: 71.6 kg (157 lb 12.8 oz)    [unfilled]  Body mass index is 23.29 kg/m².      Physical Exam   Constitutional: He appears well-developed and well-nourished. No distress.   HENT:   Head: Normocephalic and atraumatic.   Neck: Carotid bruit is not present. No thyromegaly present.   Cardiovascular: Normal rate, regular rhythm, normal heart sounds and intact distal pulses. Exam reveals no gallop.   No murmur heard.  Pulmonary/Chest: Effort normal and breath sounds normal. No respiratory distress. He has no wheezes. He has no rales.   Abdominal: Soft. Bowel sounds are normal. He exhibits no mass. There is no tenderness. There is no guarding.         Problem List Items Addressed This Visit        Cardiovascular and Mediastinum    Coronary artery disease involving native coronary artery of native heart    Hyperlipidemia    Hypertension - Primary (Chronic)    Paroxysmal atrial fibrillation (CMS/HCC)        Assessment/Plan   In for recheck of multiple medical problems.  Hypertension, hyperlipidemia, CAD, PAF.  History of stroke and hypercoagulable state.  SAD.  Heterozygous MTHFR gene mutation.  Blood pressure control is excellent.  Lipids are excelllent.  No arrhythmias.   Had annual lab work April 2019.  Gets lipids every 3 months.  Urologist checks his PSA and prostate exam. Had terrible fatigue 3 months ago.  Has no trouble with spin class and exercise.  Fatigue is still present but pretty mild right now.  Due for Prevnar today.  Pneumovax in 1 year.  He has Medicare but is a federal plan so his preventive exam should be covered in the future.  He knows to space immunizations a month apart regarding his  Shingrix.  SAD is stable.  History of MTHFR gene mutation.  Annual wellness visit November 2020.               Dragon disclaimer:   Much of this encounter note is an electronic transcription/translation of spoken language to printed text. The electronic translation of spoken language may permit erroneous, or at times, nonsensical words or phrases to be inadvertently transcribed; Although I have reviewed the note for such errors, some may still exist.

## 2020-02-06 NOTE — PROGRESS NOTES
Advance Care Planning   ACP discussion was held with the patient during this visit. Patient has an advance directive, copy requested.

## 2020-02-07 ENCOUNTER — OFFICE VISIT (OUTPATIENT)
Dept: INTERNAL MEDICINE | Facility: CLINIC | Age: 65
End: 2020-02-07

## 2020-02-07 DIAGNOSIS — Z23 NEED FOR VACCINATION: Primary | ICD-10-CM

## 2020-02-07 LAB
CHOLEST SERPL-MCNC: 141 MG/DL (ref 0–200)
HDLC SERPL-MCNC: 58 MG/DL (ref 40–60)
LDLC SERPL CALC-MCNC: 68 MG/DL (ref 0–100)
TRIGL SERPL-MCNC: 74 MG/DL (ref 0–150)
VLDLC SERPL CALC-MCNC: 14.8 MG/DL (ref 5–40)

## 2020-02-07 PROCEDURE — 90670 PCV13 VACCINE IM: CPT | Performed by: INTERNAL MEDICINE

## 2020-02-07 PROCEDURE — 99212-NC PR NO CHARGE CBC OFFICE OUTPATIENT VISIT 10 MINUTES: Performed by: INTERNAL MEDICINE

## 2020-02-07 PROCEDURE — G0009 ADMIN PNEUMOCOCCAL VACCINE: HCPCS | Performed by: INTERNAL MEDICINE

## 2020-02-07 NOTE — PATIENT INSTRUCTIONS
Pneumococcal Conjugate Vaccine (PCV13): What You Need to Know  1. Why get vaccinated?  Pneumococcal conjugate vaccine (PCV13) can prevent pneumococcal disease.  Pneumococcal disease refers to any illness caused by pneumococcal bacteria. These bacteria can cause many types of illnesses, including pneumonia, which is an infection of the lungs. Pneumococcal bacteria are one of the most common causes of pneumonia.  Besides pneumonia, pneumococcal bacteria can also cause:  · Ear infections  · Sinus infections  · Meningitis (infection of the tissue covering the brain and spinal cord)  · Bacteremia (bloodstream infection)  Anyone can get pneumococcal disease, but children under 2 years of age, people with certain medical conditions, adults 65 years or older, and cigarette smokers are at the highest risk.  Most pneumococcal infections are mild. However, some can result in long-term problems, such as brain damage or hearing loss. Meningitis, bacteremia, and pneumonia caused by pneumococcal disease can be fatal.  2. PCV13  PCV13 protects against 13 types of bacteria that cause pneumococcal disease.  Infants and young children usually need 4 doses of pneumococcal conjugate vaccine, at 2, 4, 6, and 12-15 months of age. In some cases, a child might need fewer than 4 doses to complete PCV13 vaccination.  A dose of PCV23 vaccine is also recommended for anyone 2 years or older with certain medical conditions if they did not already receive PCV13.  This vaccine may be given to adults 65 years or older based on discussions between the patient and health care provider.  3. Talk with your health care provider  Tell your vaccine provider if the person getting the vaccine:  · Has had an allergic reaction after a previous dose of PCV13, to an earlier pneumococcal conjugate vaccine known as PCV7, or to any vaccine containing diphtheria toxoid (for example, DTaP), or has any severe, life-threatening allergies.  · In some cases, your health  care provider may decide to postpone PCV13 vaccination to a future visit.  People with minor illnesses, such as a cold, may be vaccinated. People who are moderately or severely ill should usually wait until they recover before getting PCV13.  Your health care provider can give you more information.  4. Risks of a vaccine reaction  · Redness, swelling, pain, or tenderness where the shot is given, and fever, loss of appetite, fussiness (irritability), feeling tired, headache, and chills can happen after PCV13.  Young children may be at increased risk for seizures caused by fever after PCV13 if it is administered at the same time as inactivated influenza vaccine. Ask your health care provider for more information.  People sometimes faint after medical procedures, including vaccination. Tell your provider if you feel dizzy or have vision changes or ringing in the ears.  As with any medicine, there is a very remote chance of a vaccine causing a severe allergic reaction, other serious injury, or death.  5. What if there is a serious problem?  An allergic reaction could occur after the vaccinated person leaves the clinic. If you see signs of a severe allergic reaction (hives, swelling of the face and throat, difficulty breathing, a fast heartbeat, dizziness, or weakness), call 9-1-1 and get the person to the nearest hospital.  For other signs that concern you, call your health care provider.  Adverse reactions should be reported to the Vaccine Adverse Event Reporting System (VAERS). Your health care provider will usually file this report, or you can do it yourself. Visit the VAERS website at www.vaers.hhs.gov or call 1-497.661.1100. VAERS is only for reporting reactions, and VAERS staff do not give medical advice.  6. The National Vaccine Injury Compensation Program  The National Vaccine Injury Compensation Program (VICP) is a federal program that was created to compensate people who may have been injured by certain  vaccines. Visit the VICP website at www.[facility].gov/vaccinecompensation or call 1-727.583.2135 to learn about the program and about filing a claim. There is a time limit to file a claim for compensation.  7. How can I learn more?  · Ask your health care provider.  · Call your local or state health department.  · Contact the Centers for Disease Control and Prevention (CDC):  ? Call 1-686.145.5660 (2-303-HPI-INFO) or  ? Visit CDC's website at www.cdc.gov/vaccines  Vaccine Information Statement PCV13 Vaccine (10/30/2019)  This information is not intended to replace advice given to you by your health care provider. Make sure you discuss any questions you have with your health care provider.  Document Released: 10/15/2007 Document Revised: 11/01/2019 Document Reviewed: 07/30/2019  Elsevier Interactive Patient Education © 2019 [employer] Inc.

## 2020-02-29 DIAGNOSIS — K21.9 GASTROESOPHAGEAL REFLUX DISEASE, ESOPHAGITIS PRESENCE NOT SPECIFIED: ICD-10-CM

## 2020-03-02 RX ORDER — PANTOPRAZOLE SODIUM 40 MG/1
TABLET, DELAYED RELEASE ORAL
Qty: 90 TABLET | Refills: 3 | Status: SHIPPED | OUTPATIENT
Start: 2020-03-02 | End: 2021-02-10

## 2020-04-21 ENCOUNTER — OFFICE VISIT (OUTPATIENT)
Dept: INTERNAL MEDICINE | Facility: CLINIC | Age: 65
End: 2020-04-21

## 2020-04-21 DIAGNOSIS — J01.01 ACUTE RECURRENT MAXILLARY SINUSITIS: Primary | ICD-10-CM

## 2020-04-21 PROCEDURE — 99443 PR PHYS/QHP TELEPHONE EVALUATION 21-30 MIN: CPT | Performed by: INTERNAL MEDICINE

## 2020-04-21 RX ORDER — METHYLPREDNISOLONE 4 MG/1
TABLET ORAL
Qty: 1 EACH | Refills: 0 | Status: SHIPPED | OUTPATIENT
Start: 2020-04-21 | End: 2020-07-10

## 2020-04-21 RX ORDER — AMOXICILLIN AND CLAVULANATE POTASSIUM 875; 125 MG/1; MG/1
1 TABLET, FILM COATED ORAL EVERY 12 HOURS SCHEDULED
Qty: 20 TABLET | Refills: 0 | Status: SHIPPED | OUTPATIENT
Start: 2020-04-21 | End: 2020-10-12

## 2020-04-21 NOTE — PROGRESS NOTES
Subjective   Brian Barriga is a 64 y.o. male.     Chief Complaint   Patient presents with   • Chills   • Sore Throat         Chills   This is a new problem. Associated symptoms include chills. Pertinent negatives include no fever or nausea. He has tried nothing for the symptoms.        The following portions of the patient's history were reviewed and updated as appropriate: allergies, current medications, past social history and problem list.    Outpatient Medications Marked as Taking for the 4/21/20 encounter (Office Visit) with Robert Light MD   Medication Sig Dispense Refill   • aspirin 81 MG EC tablet Take 81 mg by mouth Daily.     • atorvastatin (LIPITOR) 40 MG tablet TAKE 1 TABLET BY MOUTH EVERY MORNING 90 tablet 0   • finasteride (PROSCAR) 5 MG tablet Take 1 tablet by mouth daily.     • folic acid (FOLVITE) 1 MG tablet TAKE 1 TABLET BY MOUTH DAILY 90 tablet 0   • levocetirizine (XYZAL) 5 MG tablet TAKE 1 TABLET BY MOUTH EVERY EVENING 90 tablet 0   • montelukast (SINGULAIR) 10 MG tablet Take 1 tablet by mouth Daily. 90 tablet 1   • Multiple Vitamin (MULTI VITAMIN DAILY PO) Take 1 tablet by mouth Daily.     • pantoprazole (PROTONIX) 40 MG EC tablet TAKE 1 TABLET BY MOUTH EVERY MORNING 30 MINUTES BEFORE BREAKFAST 90 tablet 3   • sildenafil (REVATIO) 20 MG tablet Take 20 mg by mouth As Needed.     • vitamin D (ERGOCALCIFEROL) 1.25 MG (57327 UT) capsule capsule TAKE 1 CAPSULE BY MOUTH 1 TIME EVERY WEEK 13 capsule 3       Review of Systems   Constitutional: Positive for chills. Negative for fever.   HENT: Positive for sinus pressure. Negative for rhinorrhea.    Respiratory: Negative for shortness of breath and wheezing.    Gastrointestinal: Negative for nausea.       Objective   There were no vitals filed for this visit.   Wt Readings from Last 3 Encounters:   02/06/20 71.6 kg (157 lb 12.8 oz)   11/06/19 71.2 kg (157 lb)   08/01/19 70.1 kg (154 lb 9.6 oz)    There is no height or weight on file to calculate  BMI.      Physical Exam   Constitutional: He appears well-developed and well-nourished.   Psychiatric: He has a normal mood and affect. His behavior is normal. Judgment and thought content normal.         Problem List Items Addressed This Visit     None      Visit Diagnoses     Acute recurrent maxillary sinusitis    -  Primary        Assessment/Plan   Audio visit today due to the COVID pandemic.  In with several days of throat irritation.  Stuffiness.  Sinuses feel like they are filling up.  He has had some chills.  He had a similar episode 1 month or more ago went to [facility].  Got Rocephin.  Got Augmentin.  Was in bed for a week.  Finally saw his allergist and got a round of prednisone and Levaquin.  He is on a pretty heavy allergy schedule including nasal steroids and montelukast and Zyrtec.  We will add 10 days of Augmentin 875 twice daily along with a Medrol Dosepak.  Perhaps when everything clears up and antibody tests are available he will be a good candidate for antibody testing to make sure he did not have COVID-19 last month.  Spent 15 minutes with patient today on the visit.           Tricia disclaimer:   Much of this encounter note is an electronic transcription/translation of spoken language to printed text. The electronic translation of spoken language may permit erroneous, or at times, nonsensical words or phrases to be inadvertently transcribed; Although I have reviewed the note for such errors, some may still exist.

## 2020-04-28 RX ORDER — ATORVASTATIN CALCIUM 40 MG/1
TABLET, FILM COATED ORAL
Qty: 90 TABLET | Refills: 1 | Status: SHIPPED | OUTPATIENT
Start: 2020-04-28 | End: 2020-11-18

## 2020-05-19 RX ORDER — MONTELUKAST SODIUM 10 MG/1
10 TABLET ORAL DAILY
Qty: 90 TABLET | Refills: 1 | Status: SHIPPED | OUTPATIENT
Start: 2020-05-19

## 2020-07-02 DIAGNOSIS — E78.5 HYPERLIPIDEMIA: ICD-10-CM

## 2020-07-02 RX ORDER — FOLIC ACID 1 MG/1
TABLET ORAL
Qty: 90 TABLET | Refills: 1 | Status: SHIPPED | OUTPATIENT
Start: 2020-07-02 | End: 2020-12-28

## 2020-07-08 ENCOUNTER — OFFICE VISIT (OUTPATIENT)
Dept: INTERNAL MEDICINE | Facility: CLINIC | Age: 65
End: 2020-07-08

## 2020-07-08 DIAGNOSIS — I10 ESSENTIAL HYPERTENSION: Chronic | ICD-10-CM

## 2020-07-08 DIAGNOSIS — Z20.822 EXPOSURE TO COVID-19 VIRUS: ICD-10-CM

## 2020-07-08 DIAGNOSIS — E78.5 HYPERLIPIDEMIA, UNSPECIFIED HYPERLIPIDEMIA TYPE: Primary | ICD-10-CM

## 2020-07-08 DIAGNOSIS — N40.0 BENIGN PROSTATIC HYPERPLASIA, UNSPECIFIED WHETHER LOWER URINARY TRACT SYMPTOMS PRESENT: ICD-10-CM

## 2020-07-09 LAB
ALBUMIN SERPL-MCNC: 4.4 G/DL (ref 3.5–5.2)
ALBUMIN/GLOB SERPL: 2 G/DL
ALP SERPL-CCNC: 102 U/L (ref 39–117)
ALT SERPL-CCNC: 23 U/L (ref 1–41)
AST SERPL-CCNC: 23 U/L (ref 1–40)
BASOPHILS # BLD AUTO: 0.03 10*3/MM3 (ref 0–0.2)
BASOPHILS NFR BLD AUTO: 0.5 % (ref 0–1.5)
BILIRUB SERPL-MCNC: 0.4 MG/DL (ref 0–1.2)
BUN SERPL-MCNC: 13 MG/DL (ref 8–23)
BUN/CREAT SERPL: 12.9 (ref 7–25)
CALCIUM SERPL-MCNC: 9.3 MG/DL (ref 8.6–10.5)
CHLORIDE SERPL-SCNC: 103 MMOL/L (ref 98–107)
CHOLEST SERPL-MCNC: 141 MG/DL (ref 0–200)
CO2 SERPL-SCNC: 28.5 MMOL/L (ref 22–29)
CREAT SERPL-MCNC: 1.01 MG/DL (ref 0.76–1.27)
EOSINOPHIL # BLD AUTO: 0.04 10*3/MM3 (ref 0–0.4)
EOSINOPHIL NFR BLD AUTO: 0.6 % (ref 0.3–6.2)
ERYTHROCYTE [DISTWIDTH] IN BLOOD BY AUTOMATED COUNT: 13.2 % (ref 12.3–15.4)
GLOBULIN SER CALC-MCNC: 2.2 GM/DL
GLUCOSE SERPL-MCNC: 86 MG/DL (ref 65–99)
HCT VFR BLD AUTO: 42.6 % (ref 37.5–51)
HDLC SERPL-MCNC: 67 MG/DL (ref 40–60)
HGB BLD-MCNC: 13.7 G/DL (ref 13–17.7)
IMM GRANULOCYTES # BLD AUTO: 0.02 10*3/MM3 (ref 0–0.05)
IMM GRANULOCYTES NFR BLD AUTO: 0.3 % (ref 0–0.5)
LDLC SERPL CALC-MCNC: 51 MG/DL (ref 0–100)
LDLC/HDLC SERPL: 0.77 {RATIO}
LYMPHOCYTES # BLD AUTO: 1.86 10*3/MM3 (ref 0.7–3.1)
LYMPHOCYTES NFR BLD AUTO: 29.5 % (ref 19.6–45.3)
MCH RBC QN AUTO: 31.1 PG (ref 26.6–33)
MCHC RBC AUTO-ENTMCNC: 32.2 G/DL (ref 31.5–35.7)
MCV RBC AUTO: 96.8 FL (ref 79–97)
MONOCYTES # BLD AUTO: 0.71 10*3/MM3 (ref 0.1–0.9)
MONOCYTES NFR BLD AUTO: 11.3 % (ref 5–12)
NEUTROPHILS # BLD AUTO: 3.64 10*3/MM3 (ref 1.7–7)
NEUTROPHILS NFR BLD AUTO: 57.8 % (ref 42.7–76)
NRBC BLD AUTO-RTO: 0 /100 WBC (ref 0–0.2)
PLATELET # BLD AUTO: 189 10*3/MM3 (ref 140–450)
POTASSIUM SERPL-SCNC: 3.7 MMOL/L (ref 3.5–5.2)
PROT SERPL-MCNC: 6.6 G/DL (ref 6–8.5)
PSA SERPL-MCNC: 0.38 NG/ML (ref 0–4)
RBC # BLD AUTO: 4.4 10*6/MM3 (ref 4.14–5.8)
SARS-COV-2 AB SERPL QL IA: NEGATIVE
SODIUM SERPL-SCNC: 141 MMOL/L (ref 136–145)
TRIGL SERPL-MCNC: 113 MG/DL (ref 0–150)
VLDLC SERPL CALC-MCNC: 22.6 MG/DL
WBC # BLD AUTO: 6.3 10*3/MM3 (ref 3.4–10.8)

## 2020-07-10 ENCOUNTER — OFFICE VISIT (OUTPATIENT)
Dept: INTERNAL MEDICINE | Facility: CLINIC | Age: 65
End: 2020-07-10

## 2020-07-10 VITALS
HEART RATE: 62 BPM | DIASTOLIC BLOOD PRESSURE: 70 MMHG | SYSTOLIC BLOOD PRESSURE: 118 MMHG | WEIGHT: 160 LBS | OXYGEN SATURATION: 99 % | RESPIRATION RATE: 16 BRPM | HEIGHT: 69 IN | TEMPERATURE: 97.1 F | BODY MASS INDEX: 23.7 KG/M2

## 2020-07-10 DIAGNOSIS — I48.0 PAROXYSMAL ATRIAL FIBRILLATION (HCC): ICD-10-CM

## 2020-07-10 DIAGNOSIS — E78.5 HYPERLIPIDEMIA, UNSPECIFIED HYPERLIPIDEMIA TYPE: ICD-10-CM

## 2020-07-10 DIAGNOSIS — I25.10 CORONARY ARTERY DISEASE INVOLVING NATIVE CORONARY ARTERY OF NATIVE HEART WITHOUT ANGINA PECTORIS: ICD-10-CM

## 2020-07-10 DIAGNOSIS — J45.20 MILD INTERMITTENT ASTHMA WITHOUT COMPLICATION: ICD-10-CM

## 2020-07-10 DIAGNOSIS — I10 ESSENTIAL HYPERTENSION: Primary | Chronic | ICD-10-CM

## 2020-07-10 PROCEDURE — 99214 OFFICE O/P EST MOD 30 MIN: CPT | Performed by: INTERNAL MEDICINE

## 2020-07-10 NOTE — PROGRESS NOTES
Subjective   Brian Barriga is a 65 y.o. male.     Chief Complaint   Patient presents with   • Hypertension   • Hyperlipidemia   • Coronary Artery Disease         History of MTHFR gene mutation.  History of NSAID which is been stable.    Hypertension   This is a chronic problem. The current episode started more than 1 year ago. The problem is unchanged. The problem is controlled. Associated symptoms include chest pain and malaise/fatigue. Pertinent negatives include no headaches, palpitations, peripheral edema or shortness of breath. There are no associated agents to hypertension. Risk factors for coronary artery disease include dyslipidemia and male gender. Current antihypertension treatment includes beta blockers. The current treatment provides significant improvement. There are no compliance problems.  Hypertensive end-organ damage includes CAD/MI and CVA. There is no history of angina or kidney disease.   Hyperlipidemia   This is a chronic problem. The current episode started more than 1 year ago. The problem is controlled. Recent lipid tests were reviewed and are normal. Factors aggravating his hyperlipidemia include beta blockers. Associated symptoms include chest pain. Pertinent negatives include no shortness of breath. Current antihyperlipidemic treatment includes statins. The current treatment provides significant improvement of lipids. There are no compliance problems.  Risk factors for coronary artery disease include dyslipidemia, hypertension and male sex.   Coronary Artery Disease   Presents for follow-up visit. Symptoms include chest pain. Pertinent negatives include no chest pressure, chest tightness, leg swelling, palpitations or shortness of breath. Risk factors include hyperlipidemia. The symptoms have been resolved. Compliance with diet is good. Compliance with exercise is good. Compliance with medications is good.   Atrial Fibrillation   Presents for follow-up visit. Symptoms include chest pain.  Symptoms are negative for palpitations and shortness of breath. The symptoms have been stable. Past medical history includes atrial fibrillation, CAD and hyperlipidemia. There are no medication compliance problems.   Hyperglycemia   This is a chronic problem. The current episode started more than 1 month ago. Associated symptoms include abdominal pain (gerd), chest pain, chills and fatigue. Pertinent negatives include no coughing, fever, headaches, nausea or vomiting.        The following portions of the patient's history were reviewed and updated as appropriate: allergies, current medications, past social history and problem list.    Outpatient Medications Marked as Taking for the 7/10/20 encounter (Office Visit) with Robert Light MD   Medication Sig Dispense Refill   • amoxicillin-clavulanate (Augmentin) 875-125 MG per tablet Take 1 tablet by mouth Every 12 (Twelve) Hours. 20 tablet 0   • aspirin 81 MG EC tablet Take 81 mg by mouth Daily.     • atorvastatin (LIPITOR) 40 MG tablet TAKE 1 TABLET BY MOUTH EVERY MORNING 90 tablet 1   • finasteride (PROSCAR) 5 MG tablet Take 1 tablet by mouth daily.     • folic acid (FOLVITE) 1 MG tablet TAKE 1 TABLET BY MOUTH DAILY 90 tablet 1   • levocetirizine (XYZAL) 5 MG tablet TAKE 1 TABLET BY MOUTH EVERY EVENING 90 tablet 0   • montelukast (SINGULAIR) 10 MG tablet TAKE 1 TABLET BY MOUTH DAILY 90 tablet 1   • Multiple Vitamin (MULTI VITAMIN DAILY PO) Take 1 tablet by mouth Daily.     • pantoprazole (PROTONIX) 40 MG EC tablet TAKE 1 TABLET BY MOUTH EVERY MORNING 30 MINUTES BEFORE BREAKFAST 90 tablet 3   • sildenafil (REVATIO) 20 MG tablet Take 20 mg by mouth As Needed.     • vitamin D (ERGOCALCIFEROL) 1.25 MG (81807 UT) capsule capsule TAKE 1 CAPSULE BY MOUTH 1 TIME EVERY WEEK 13 capsule 3       Review of Systems   Constitutional: Positive for chills, fatigue and malaise/fatigue. Negative for fever.   Respiratory: Negative for cough, chest tightness, shortness of breath and  wheezing.    Cardiovascular: Positive for chest pain. Negative for palpitations and leg swelling.   Gastrointestinal: Positive for abdominal pain (gerd). Negative for constipation, diarrhea, nausea and vomiting.   Neurological: Negative for headaches.       Objective   Vitals:    07/10/20 0838   BP: 118/70   Pulse: 62   Resp: 16   Temp: 97.1 °F (36.2 °C)   SpO2: 99%          07/10/20  0838   Weight: 72.6 kg (160 lb)    [unfilled]  Body mass index is 23.62 kg/m².      Physical Exam   Constitutional: He appears well-developed and well-nourished. No distress.   HENT:   Head: Normocephalic and atraumatic.   Neck: Carotid bruit is not present. No thyromegaly present.   Cardiovascular: Normal rate, regular rhythm, normal heart sounds and intact distal pulses. Exam reveals no gallop.   No murmur heard.  Pulmonary/Chest: Effort normal and breath sounds normal. No respiratory distress. He has no wheezes. He has no rales.   Abdominal: Soft. Bowel sounds are normal. He exhibits no mass. There is no tenderness. There is no guarding.         Problem List Items Addressed This Visit        Cardiovascular and Mediastinum    Coronary artery disease involving native coronary artery of native heart    Hyperlipidemia    Hypertension - Primary (Chronic)    Paroxysmal atrial fibrillation (CMS/HCC)       Respiratory    Asthma        Assessment/Plan   In for recheck of multiple medical problems.  Hypertension, hyperlipidemia, CAD, PAF.  History of stroke and hypercoagulable state.  SAD.  Heterozygous MTHFR gene mutation.  Blood pressure control is excellent.  Lipids are excelllent.  No arrhythmias.   Had annual lab work today July 2020.  Gets lipids every 3 months.  Urologist checks his PSA and prostate exam.  Has no trouble with spin class and exercise.  Fatigue is still present but pretty mild right now.  Pneumovax February 2021.  He has Medicare but is a federal plan so his preventive exam should be covered in the future.  SAD is  stable.  History of MTHFR gene mutation.  Annual wellness visit November 2020.  He has had about 3 weeks of head congestion.  Ears feel full.  Swims a lot.  Lots of allergies.  Gets allergy shots.  Last year had to have his allergist take care of this.  He has been to [facility] center was given Rocephin followed by 2 rounds of Zithromax.  He is now on Augmentin.  Also got a round of Medrol.  A little improved but he is just not clearing up.  He can add a Owen pot.  Due for second Shingrix shot.               Tricia disclaimer:   Much of this encounter note is an electronic transcription/translation of spoken language to printed text. The electronic translation of spoken language may permit erroneous, or at times, nonsensical words or phrases to be inadvertently transcribed; Although I have reviewed the note for such errors, some may still exist.

## 2020-10-01 ENCOUNTER — RESULTS ENCOUNTER (OUTPATIENT)
Dept: INTERNAL MEDICINE | Facility: CLINIC | Age: 65
End: 2020-10-01

## 2020-10-01 DIAGNOSIS — E78.5 HYPERLIPIDEMIA, UNSPECIFIED HYPERLIPIDEMIA TYPE: ICD-10-CM

## 2020-10-12 ENCOUNTER — OFFICE VISIT (OUTPATIENT)
Dept: INTERNAL MEDICINE | Facility: CLINIC | Age: 65
End: 2020-10-12

## 2020-10-12 VITALS
DIASTOLIC BLOOD PRESSURE: 72 MMHG | OXYGEN SATURATION: 99 % | SYSTOLIC BLOOD PRESSURE: 114 MMHG | BODY MASS INDEX: 23.55 KG/M2 | RESPIRATION RATE: 16 BRPM | HEIGHT: 69 IN | WEIGHT: 159 LBS | HEART RATE: 66 BPM | TEMPERATURE: 96.8 F

## 2020-10-12 DIAGNOSIS — I10 ESSENTIAL HYPERTENSION: Primary | Chronic | ICD-10-CM

## 2020-10-12 DIAGNOSIS — E78.5 HYPERLIPIDEMIA, UNSPECIFIED HYPERLIPIDEMIA TYPE: ICD-10-CM

## 2020-10-12 DIAGNOSIS — I48.0 PAROXYSMAL ATRIAL FIBRILLATION (HCC): ICD-10-CM

## 2020-10-12 DIAGNOSIS — Z23 NEED FOR VACCINATION: ICD-10-CM

## 2020-10-12 PROCEDURE — 99214 OFFICE O/P EST MOD 30 MIN: CPT | Performed by: INTERNAL MEDICINE

## 2020-10-12 PROCEDURE — 90694 VACC AIIV4 NO PRSRV 0.5ML IM: CPT | Performed by: INTERNAL MEDICINE

## 2020-10-12 PROCEDURE — G0008 ADMIN INFLUENZA VIRUS VAC: HCPCS | Performed by: INTERNAL MEDICINE

## 2020-10-12 NOTE — PATIENT INSTRUCTIONS
Influenza Virus Vaccine injection  What is this medicine?  INFLUENZA VIRUS VACCINE (in [facility] SEEN) helps to reduce the risk of getting influenza also known as the flu. The vaccine only helps protect you against some strains of the flu.  This medicine may be used for other purposes; ask your health care provider or pharmacist if you have questions.  COMMON BRAND NAME(S): Afluria, Afluria Quadrivalent, Agriflu, Alfuria, FLUAD, FLUAD Quadrivalent, Fluarix, Fluarix Quadrivalent, Flublok, Flublok Quadrivalent, FLUCELVAX, FLUCELVAX Quadrivalent, Flulaval, Flulaval Quadrivalent, Fluvirin, Fluzone, Fluzone High-Dose, Fluzone Intradermal, Fluzone Quadrivalent  What should I tell my health care provider before I take this medicine?  They need to know if you have any of these conditions:  · bleeding disorder like hemophilia  · fever or infection  · Guillain-Basin syndrome or other neurological problems  · immune system problems  · infection with the human immunodeficiency virus (HIV) or AIDS  · low blood platelet counts  · multiple sclerosis  · an unusual or allergic reaction to influenza virus vaccine, latex, other medicines, foods, dyes, or preservatives. Different brands of vaccines contain different allergens. Some may contain latex or eggs. Talk to your doctor about your allergies to make sure that you get the right vaccine.  · pregnant or trying to get pregnant  · breast-feeding  How should I use this medicine?  This vaccine is for injection into a muscle or under the skin. It is given by a health care professional.  A copy of Vaccine Information Statements will be given before each vaccination. Read this sheet carefully each time. The sheet may change frequently.  Talk to your healthcare provider to see which vaccines are right for you. Some vaccines should not be used in all age groups.  Overdosage: If you think you have taken too much of this medicine contact a poison control center or emergency  room at once.  NOTE: This medicine is only for you. Do not share this medicine with others.  What if I miss a dose?  This does not apply.  What may interact with this medicine?  · chemotherapy or radiation therapy  · medicines that lower your immune system like etanercept, anakinra, infliximab, and adalimumab  · medicines that treat or prevent blood clots like warfarin  · phenytoin  · steroid medicines like prednisone or cortisone  · theophylline  · vaccines  This list may not describe all possible interactions. Give your health care provider a list of all the medicines, herbs, non-prescription drugs, or dietary supplements you use. Also tell them if you smoke, drink alcohol, or use illegal drugs. Some items may interact with your medicine.  What should I watch for while using this medicine?  Report any side effects that do not go away within 3 days to your doctor or health care professional. Call your health care provider if any unusual symptoms occur within 6 weeks of receiving this vaccine.  You may still catch the flu, but the illness is not usually as bad. You cannot get the flu from the vaccine. The vaccine will not protect against colds or other illnesses that may cause fever. The vaccine is needed every year.  What side effects may I notice from receiving this medicine?  Side effects that you should report to your doctor or health care professional as soon as possible:  · allergic reactions like skin rash, itching or hives, swelling of the face, lips, or tongue  Side effects that usually do not require medical attention (report to your doctor or health care professional if they continue or are bothersome):  · fever  · headache  · muscle aches and pains  · pain, tenderness, redness, or swelling at the injection site  · tiredness  This list may not describe all possible side effects. Call your doctor for medical advice about side effects. You may report side effects to FDA at 7-055-FDA-0175.  Where should I  keep my medicine?  The vaccine will be given by a health care professional in a clinic, pharmacy, doctor's office, or other health care setting. You will not be given vaccine doses to store at home.  NOTE: This sheet is a summary. It may not cover all possible information. If you have questions about this medicine, talk to your doctor, pharmacist, or health care provider.  © 2020 Elsevier/Gold Standard (2019-11-12 08:45:43)

## 2020-10-12 NOTE — PROGRESS NOTES
Subjective   Brian Barriga is a 65 y.o. male.     Chief Complaint   Patient presents with   • Hypertension   • Hyperlipidemia   • Coronary Artery Disease   • Atrial Fibrillation         History of MTHFR gene mutation.  History of NSAID which is been stable.    Hypertension  This is a chronic problem. The current episode started more than 1 year ago. The problem is unchanged. The problem is controlled. Associated symptoms include chest pain and malaise/fatigue. Pertinent negatives include no headaches, palpitations, peripheral edema or shortness of breath. There are no associated agents to hypertension. Risk factors for coronary artery disease include dyslipidemia and male gender. Current antihypertension treatment includes beta blockers. The current treatment provides significant improvement. There are no compliance problems.  Hypertensive end-organ damage includes CAD/MI and CVA. There is no history of angina or kidney disease.   Hyperlipidemia  This is a chronic problem. The current episode started more than 1 year ago. The problem is controlled. Recent lipid tests were reviewed and are normal. Factors aggravating his hyperlipidemia include beta blockers. Associated symptoms include chest pain. Pertinent negatives include no shortness of breath. Current antihyperlipidemic treatment includes statins. The current treatment provides significant improvement of lipids. There are no compliance problems.  Risk factors for coronary artery disease include dyslipidemia, hypertension and male sex.   Coronary Artery Disease  Presents for follow-up visit. Symptoms include chest pain. Pertinent negatives include no chest pressure, chest tightness, leg swelling, palpitations or shortness of breath. Risk factors include hyperlipidemia. The symptoms have been resolved. Compliance with diet is good. Compliance with exercise is good. Compliance with medications is good.   Atrial Fibrillation  Presents for follow-up visit. Symptoms  include chest pain. Symptoms are negative for palpitations and shortness of breath. The symptoms have been stable. Past medical history includes atrial fibrillation, CAD and hyperlipidemia. There are no medication compliance problems.   Hyperglycemia  This is a chronic problem. The current episode started more than 1 month ago. Associated symptoms include abdominal pain (gerd), chest pain, chills and fatigue. Pertinent negatives include no coughing, fever, headaches, nausea or vomiting.        The following portions of the patient's history were reviewed and updated as appropriate: allergies, current medications, past social history and problem list.    Outpatient Medications Marked as Taking for the 10/12/20 encounter (Office Visit) with Robert Light MD   Medication Sig Dispense Refill   • aspirin 81 MG EC tablet Take 81 mg by mouth Daily.     • atorvastatin (LIPITOR) 40 MG tablet TAKE 1 TABLET BY MOUTH EVERY MORNING 90 tablet 1   • finasteride (PROSCAR) 5 MG tablet Take 1 tablet by mouth daily.     • folic acid (FOLVITE) 1 MG tablet TAKE 1 TABLET BY MOUTH DAILY 90 tablet 1   • levocetirizine (XYZAL) 5 MG tablet TAKE 1 TABLET BY MOUTH EVERY EVENING 90 tablet 0   • montelukast (SINGULAIR) 10 MG tablet TAKE 1 TABLET BY MOUTH DAILY 90 tablet 1   • Multiple Vitamin (MULTI VITAMIN DAILY PO) Take 1 tablet by mouth Daily.     • pantoprazole (PROTONIX) 40 MG EC tablet TAKE 1 TABLET BY MOUTH EVERY MORNING 30 MINUTES BEFORE BREAKFAST 90 tablet 3   • sildenafil (REVATIO) 20 MG tablet Take 20 mg by mouth As Needed.     • vitamin D (ERGOCALCIFEROL) 1.25 MG (82085 UT) capsule capsule TAKE 1 CAPSULE BY MOUTH 1 TIME EVERY WEEK 13 capsule 3   • [DISCONTINUED] amoxicillin-clavulanate (Augmentin) 875-125 MG per tablet Take 1 tablet by mouth Every 12 (Twelve) Hours. 20 tablet 0       Review of Systems   Constitutional: Positive for chills, fatigue and malaise/fatigue. Negative for fever.   Respiratory: Negative for cough, chest  tightness, shortness of breath and wheezing.    Cardiovascular: Positive for chest pain. Negative for palpitations and leg swelling.   Gastrointestinal: Positive for abdominal pain (gerd). Negative for constipation, diarrhea, nausea and vomiting.   Neurological: Negative for headaches.       Objective   Vitals:    10/12/20 0855   BP: 114/72   Pulse: 66   Resp: 16   Temp: 96.8 °F (36 °C)   SpO2: 99%          10/12/20  0855   Weight: 72.1 kg (159 lb)    [unfilled]  Body mass index is 23.47 kg/m².      Physical Exam   Constitutional: He appears well-developed. No distress.   HENT:   Head: Normocephalic and atraumatic.   Neck: Carotid bruit is not present. No thyromegaly present.   Cardiovascular: Normal rate and normal heart sounds. An irregular rhythm present. Exam reveals no gallop.   No murmur heard.  Pulmonary/Chest: Effort normal and breath sounds normal. No respiratory distress. He has no wheezes. He has no rales.   Abdominal: Soft. Bowel sounds are normal. He exhibits no mass. There is no abdominal tenderness. There is no guarding.         Problem List Items Addressed This Visit        Cardiovascular and Mediastinum    Hyperlipidemia    Hypertension - Primary (Chronic)    Paroxysmal atrial fibrillation (CMS/HCC)        Assessment/Plan   In for recheck of multiple medical problems.  Hypertension, hyperlipidemia, CAD, PAF.  History of stroke and hypercoagulable state.  SAD.  Heterozygous MTHFR gene mutation.  Blood pressure control is excellent.  Lipids are excelllent.  No arrhythmias.   Had annual lab work today July 2020.  Gets lipids every 3 months.  Urologist checks his PSA and prostate exam.  Has no trouble with spin class and exercise.  Fatigue is still present but pretty mild right now.  Pneumovax February 2021.  He has Medicare but is a federal plan so his preventive exam should be covered in the future.  SAD is stable.  History of MTHFR gene mutation.  Annual wellness visit November 2020.  Lots of  allergies.  Some new ones.  Gets allergy shots and those have now been revamped.  Due for second Shingrix shot.  Flu shot updated today.  He is 1.5 HPP today.    PPE today includes face mask and eye shield.         Dragon disclaimer:   Much of this encounter note is an electronic transcription/translation of spoken language to printed text. The electronic translation of spoken language may permit erroneous, or at times, nonsensical words or phrases to be inadvertently transcribed; Although I have reviewed the note for such errors, some may still exist.

## 2020-10-13 LAB
CHOLEST SERPL-MCNC: 118 MG/DL (ref 100–199)
CHOLEST/HDLC SERPL: 2.2 RATIO (ref 0–5)
HDLC SERPL-MCNC: 53 MG/DL
LDLC SERPL CALC-MCNC: 50 MG/DL (ref 0–99)
TRIGL SERPL-MCNC: 70 MG/DL (ref 0–149)
VLDLC SERPL CALC-MCNC: 15 MG/DL (ref 5–40)

## 2020-11-02 RX ORDER — ERGOCALCIFEROL 1.25 MG/1
CAPSULE ORAL
Qty: 13 CAPSULE | Refills: 3 | Status: SHIPPED | OUTPATIENT
Start: 2020-11-02 | End: 2021-11-04

## 2020-11-18 RX ORDER — ATORVASTATIN CALCIUM 40 MG/1
TABLET, FILM COATED ORAL
Qty: 90 TABLET | Refills: 1 | Status: SHIPPED | OUTPATIENT
Start: 2020-11-18 | End: 2021-05-11

## 2020-12-28 DIAGNOSIS — E78.5 HYPERLIPIDEMIA: ICD-10-CM

## 2020-12-28 RX ORDER — FOLIC ACID 1 MG/1
TABLET ORAL
Qty: 90 TABLET | Refills: 1 | Status: SHIPPED | OUTPATIENT
Start: 2020-12-28 | End: 2021-08-09

## 2021-01-15 ENCOUNTER — OFFICE VISIT (OUTPATIENT)
Dept: INTERNAL MEDICINE | Facility: CLINIC | Age: 66
End: 2021-01-15

## 2021-01-15 DIAGNOSIS — Z01.89 LABORATORY PROCEDURE: Primary | ICD-10-CM

## 2021-01-28 ENCOUNTER — OFFICE VISIT (OUTPATIENT)
Dept: INTERNAL MEDICINE | Facility: CLINIC | Age: 66
End: 2021-01-28

## 2021-01-28 VITALS
OXYGEN SATURATION: 99 % | HEIGHT: 69 IN | WEIGHT: 159 LBS | SYSTOLIC BLOOD PRESSURE: 114 MMHG | RESPIRATION RATE: 16 BRPM | BODY MASS INDEX: 23.55 KG/M2 | HEART RATE: 57 BPM | DIASTOLIC BLOOD PRESSURE: 72 MMHG | TEMPERATURE: 96.8 F

## 2021-01-28 DIAGNOSIS — Z00.00 ENCOUNTER FOR PREVENTIVE HEALTH EXAMINATION: Primary | ICD-10-CM

## 2021-01-28 DIAGNOSIS — E78.5 HYPERLIPIDEMIA, UNSPECIFIED HYPERLIPIDEMIA TYPE: Chronic | ICD-10-CM

## 2021-01-28 DIAGNOSIS — I10 ESSENTIAL HYPERTENSION: Chronic | ICD-10-CM

## 2021-01-28 DIAGNOSIS — N40.0 BENIGN PROSTATIC HYPERPLASIA, UNSPECIFIED WHETHER LOWER URINARY TRACT SYMPTOMS PRESENT: ICD-10-CM

## 2021-01-28 DIAGNOSIS — K21.9 GASTROESOPHAGEAL REFLUX DISEASE WITHOUT ESOPHAGITIS: Chronic | ICD-10-CM

## 2021-01-28 DIAGNOSIS — I25.10 CORONARY ARTERY DISEASE INVOLVING NATIVE CORONARY ARTERY OF NATIVE HEART WITHOUT ANGINA PECTORIS: ICD-10-CM

## 2021-01-28 PROBLEM — Z15.89 HETEROZYGOUS FOR MTHFR GENE MUTATION: Chronic | Status: ACTIVE | Noted: 2019-08-01

## 2021-01-28 PROCEDURE — 99397 PER PM REEVAL EST PAT 65+ YR: CPT | Performed by: INTERNAL MEDICINE

## 2021-01-28 PROCEDURE — G0438 PPPS, INITIAL VISIT: HCPCS | Performed by: INTERNAL MEDICINE

## 2021-01-28 PROCEDURE — 93000 ELECTROCARDIOGRAM COMPLETE: CPT | Performed by: INTERNAL MEDICINE

## 2021-01-28 RX ORDER — CHOLECALCIFEROL (VITAMIN D3) 125 MCG
5 CAPSULE ORAL NIGHTLY PRN
COMMUNITY
End: 2022-12-08

## 2021-01-28 NOTE — PROGRESS NOTES
Subjective   Brian Barriga is a 65 y.o. male.     Chief Complaint   Patient presents with   • Annual Exam   • Medicare Wellness-subsequent         In for annual preventative exam.  He sleeps well.  8-9 hours at night.  Exercises 7 days per week.  Exercises pretty aggressively.  Diet is good.  Energy is good.       The following portions of the patient's history were reviewed and updated as appropriate: allergies, current medications, past social history and problem list.      Current Outpatient Medications:   •  aspirin 81 MG EC tablet, Take 81 mg by mouth Daily., Disp: , Rfl:   •  atorvastatin (LIPITOR) 40 MG tablet, TAKE 1 TABLET BY MOUTH EVERY MORNING, Disp: 90 tablet, Rfl: 1  •  finasteride (PROSCAR) 5 MG tablet, Take 1 tablet by mouth daily., Disp: , Rfl:   •  folic acid (FOLVITE) 1 MG tablet, TAKE 1 TABLET BY MOUTH EVERY DAY, Disp: 90 tablet, Rfl: 1  •  levocetirizine (XYZAL) 5 MG tablet, TAKE 1 TABLET BY MOUTH EVERY EVENING, Disp: 90 tablet, Rfl: 0  •  melatonin 5 MG tablet tablet, Take 5 mg by mouth At Night As Needed for Sleep., Disp: , Rfl:   •  montelukast (SINGULAIR) 10 MG tablet, TAKE 1 TABLET BY MOUTH DAILY, Disp: 90 tablet, Rfl: 1  •  Multiple Vitamin (MULTI VITAMIN DAILY PO), Take 1 tablet by mouth Daily., Disp: , Rfl:   •  pantoprazole (PROTONIX) 40 MG EC tablet, TAKE 1 TABLET BY MOUTH EVERY MORNING 30 MINUTES BEFORE BREAKFAST, Disp: 90 tablet, Rfl: 3  •  sildenafil (REVATIO) 20 MG tablet, Take 20 mg by mouth As Needed., Disp: , Rfl:   •  vitamin D (ERGOCALCIFEROL) 1.25 MG (38823 UT) capsule capsule, TAKE 1 CAPSULE BY MOUTH ONCE EVERY WEEK, Disp: 13 capsule, Rfl: 3    Review of Systems   Constitutional: Negative for chills, diaphoresis, fatigue, fever and unexpected weight change.   HENT: Negative for congestion, ear pain, nosebleeds, rhinorrhea, sore throat and voice change.    Eyes: Negative for discharge, itching and visual disturbance.   Respiratory: Negative for cough, chest tightness,  shortness of breath and wheezing.    Cardiovascular: Negative for chest pain, palpitations and leg swelling.   Gastrointestinal: Negative for abdominal pain, anal bleeding, constipation, diarrhea, nausea and vomiting.   Endocrine: Negative for cold intolerance, heat intolerance and polyuria.   Genitourinary: Negative for difficulty urinating, dysuria, frequency, hematuria and urgency.   Musculoskeletal: Negative for arthralgias, back pain and myalgias.   Skin: Negative for rash and wound.   Allergic/Immunologic: Positive for environmental allergies.   Neurological: Negative for dizziness, syncope, weakness, light-headedness and headaches.   Hematological: Negative for adenopathy. Does not bruise/bleed easily.   Psychiatric/Behavioral: Negative for confusion, dysphoric mood and sleep disturbance. The patient is not nervous/anxious.        Objective   Vitals:    01/28/21 0931   BP: 114/72   Pulse: 57   Resp: 16   Temp: 96.8 °F (36 °C)   SpO2: 99%          01/28/21 0931   Weight: 72.1 kg (159 lb)    [unfilled]  Body mass index is 23.47 kg/m².      Physical Exam   Constitutional: He is oriented to person, place, and time. He appears well-developed.   HENT:   Head: Normocephalic and atraumatic.   Right Ear: External ear normal.   Left Ear: External ear normal.   Nose: Nose normal.   Eyes: Pupils are equal, round, and reactive to light. Conjunctivae are normal. No scleral icterus.   Neck: Normal range of motion. Neck supple. No JVD present. No thyromegaly present.   Cardiovascular: Normal rate, regular rhythm and normal heart sounds. Exam reveals no gallop and no friction rub.   No murmur heard.  Pulmonary/Chest: Effort normal and breath sounds normal. No respiratory distress. He has no wheezes. He has no rales.   Abdominal: Soft. Bowel sounds are normal. He exhibits no distension and no mass. There is no abdominal tenderness. There is no rebound and no guarding. No hernia.   Musculoskeletal: Normal range of motion.      Vascular Status -  His right foot exhibits abnormal foot edema (1+). His left foot exhibits abnormal foot edema (1+).  Lymphadenopathy:     He has no cervical adenopathy.   Neurological: He is alert and oriented to person, place, and time. He has normal reflexes. He displays normal reflexes.   Skin: Skin is warm and dry.   Psychiatric: His behavior is normal. Judgment and thought content normal.   Nursing note and vitals reviewed.      ECG 12 Lead    Date/Time: 1/28/2021 1:50 PM  Performed by: Robert Light MD  Authorized by: Robert Light MD   Comparison: compared with previous ECG from 11/6/2019  Similar to previous ECG  Rhythm: sinus bradycardia  Rate: bradycardic  Conduction: conduction normal  ST Segments: ST segments normal  T Waves: T waves normal  QRS axis: normal  Other: no other findings    Clinical impression: normal ECG  Comments: Sinus bradycardia.  Heart rate is 49.  This is a normal EKG.  There is no change from the prior EKG dated 11/6/2019.                    Problems Addressed this Visit        Cardiac and Vasculature    Hypertension (Chronic)    Hyperlipidemia (Chronic)    Coronary artery disease involving native coronary artery of native heart       Gastrointestinal Abdominal     Gastroesophageal reflux disease (Chronic)       Genitourinary and Reproductive     Benign prostatic hypertrophy      Other Visit Diagnoses     Encounter for preventive health examination    -  Primary      Diagnoses       Codes Comments    Encounter for preventive health examination    -  Primary ICD-10-CM: Z00.00  ICD-9-CM: V70.0     Essential hypertension     ICD-10-CM: I10  ICD-9-CM: 401.9     Hyperlipidemia, unspecified hyperlipidemia type     ICD-10-CM: E78.5  ICD-9-CM: 272.4     Coronary artery disease involving native coronary artery of native heart without angina pectoris     ICD-10-CM: I25.10  ICD-9-CM: 414.01     Gastroesophageal reflux disease without esophagitis     ICD-10-CM: K21.9  ICD-9-CM: 530.81      Benign prostatic hyperplasia, unspecified whether lower urinary tract symptoms present     ICD-10-CM: N40.0  ICD-9-CM: 600.00         Assessment/Plan   In for annual preventative exam.  Overall he presents with a variety of complex issues.  He has a history of hypertension, paroxysmal atrial fibrillation, and hypercoagulable state with MTHFR mutation and elevated homocystine.  Had a series of strokes 2012 and 2014 and was treated with thrombolytics and clot extraction systemic thrombolytics again.  He's got two stents in the left carotid system and on chronic Plavix for that.  He has coronary artery disease with a recent stent placed in the LAD following an coronary calcium score.  Has some mild allergies and allergy related asthma.  Obstructive sleep apnea.  GERD.  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  He sees Dr. Mata for urology purposes and  for GI purposes.  He's had quite a bit of lab work which is up-to-date.  He'll need a lipid profile every 3 months.  Annual lab work July 2020.  He is on aspirin low for a coagulation at this point.  He reviewed additional anticoagulation with cardiology and hematology and aspirin is felt to be perfectly adequate right now.  He's due for another MRI of the pancreas to keep an eye on his IPMN.  He wants to hold off on that.  He knows that on Medicare and Medicare does not cover preventative exams.  He is okay with that.  EKG recheck today.  He will get his Shingrix after he gets his Covid immunizations.  Annual wellness done today January 2021.    Prevention counseling was performed today. The counseling performed was routine health maintenance topics.    PPE today includes face mask and eye shield.

## 2021-01-28 NOTE — PATIENT INSTRUCTIONS
Recombinant Zoster (Shingles) Vaccine: What You Need to Know  1. Why get vaccinated?  Recombinant zoster (shingles) vaccine can prevent shingles.  Shingles (also called herpes zoster, or just zoster) is a painful skin rash, usually with blisters. In addition to the rash, shingles can cause fever, headache, chills, or upset stomach. More rarely, shingles can lead to pneumonia, hearing problems, blindness, brain inflammation (encephalitis), or death.  The most common complication of shingles is long-term nerve pain called postherpetic neuralgia (PHN). PHN occurs in the areas where the shingles rash was, even after the rash clears up. It can last for months or years after the rash goes away. The pain from PHN can be severe and debilitating.  About 10 to 18% of people who get shingles will experience PHN. The risk of PHN increases with age. An older adult with shingles is more likely to develop PHN and have longer lasting and more severe pain than a younger person with shingles.  Shingles is caused by the varicella zoster virus, the same virus that causes chickenpox. After you have chickenpox, the virus stays in your body and can cause shingles later in life. Shingles cannot be passed from one person to another, but the virus that causes shingles can spread and cause chickenpox in someone who had never had chickenpox or received chickenpox vaccine.  2. Recombinant shingles vaccine  Recombinant shingles vaccine provides strong protection against shingles. By preventing shingles, recombinant shingles vaccine also protects against PHN.  Recombinant shingles vaccine is the preferred vaccine for the prevention of shingles. However, a different vaccine, live shingles vaccine, may be used in some circumstances.  The recombinant shingles vaccine is recommended for adults 50 years and older without serious immune problems. It is given as a two-dose series.  This vaccine is also recommended for people who have already gotten  another type of shingles vaccine, the live shingles vaccine. There is no live virus in this vaccine.  Shingles vaccine may be given at the same time as other vaccines.  3. Talk with your health care provider  Tell your vaccine provider if the person getting the vaccine:  · Has had an allergic reaction after a previous dose of recombinant shingles vaccine, or has any severe, life-threatening allergies.  · Is pregnant or breastfeeding.  · Is currently experiencing an episode of shingles.  In some cases, your health care provider may decide to postpone shingles vaccination to a future visit.  People with minor illnesses, such as a cold, may be vaccinated. People who are moderately or severely ill should usually wait until they recover before getting recombinant shingles vaccine.  Your health care provider can give you more information.  4. Risks of a vaccine reaction  · A sore arm with mild or moderate pain is very common after recombinant shingles vaccine, affecting about 80% of vaccinated people. Redness and swelling can also happen at the site of the injection.  · Tiredness, muscle pain, headache, shivering, fever, stomach pain, and nausea happen after vaccination in more than half of people who receive recombinant shingles vaccine.  In clinical trials, about 1 out of 6 people who got recombinant zoster vaccine experienced side effects that prevented them from doing regular activities. Symptoms usually went away on their own in 2 to 3 days.  You should still get the second dose of recombinant zoster vaccine even if you had one of these reactions after the first dose.  People sometimes faint after medical procedures, including vaccination. Tell your provider if you feel dizzy or have vision changes or ringing in the ears.  As with any medicine, there is a very remote chance of a vaccine causing a severe allergic reaction, other serious injury, or death.  5. What if there is a serious problem?  An allergic reaction  could occur after the vaccinated person leaves the clinic. If you see signs of a severe allergic reaction (hives, swelling of the face and throat, difficulty breathing, a fast heartbeat, dizziness, or weakness), call 9-1-1 and get the person to the nearest hospital.  For other signs that concern you, call your health care provider.  Adverse reactions should be reported to the Vaccine Adverse Event Reporting System (VAERS). Your health care provider will usually file this report, or you can do it yourself. Visit the VAERS website at www.vaers.[facility].gov or call 1-855.378.5639. VAERS is only for reporting reactions, and VAERS staff do not give medical advice.  6. How can I learn more?  · Ask your health care provider.  · Call your local or state health department.  · Contact the Centers for Disease Control and Prevention (CDC):  ? Call 1-722.685.5580 (5-586-JSK-INFO) or  ? Visit CDC's website at www.cdc.gov/vaccines  Vaccine Information Statement Recombinant Zoster Vaccine (10/30/2019)  This information is not intended to replace advice given to you by your health care provider. Make sure you discuss any questions you have with your health care provider.  Document Revised: 04/07/2020 Document Reviewed: 07/24/2019  Elsevier Patient Education © 2020 [employer]

## 2021-01-28 NOTE — PROGRESS NOTES
The ABCs of the Annual Wellness Visit  Subsequent Medicare Wellness Visit    Chief Complaint   Patient presents with   • Annual Exam   • Medicare Wellness-subsequent       Subjective   History of Present Illness:  Brian Barriga is a 65 y.o. male who presents for a Subsequent Medicare Wellness Visit.    HEALTH RISK ASSESSMENT    Recent Hospitalizations:  No hospitalization(s) within the last year.    Current Medical Providers:  Patient Care Team:  Robert Light MD as PCP - General (Internal Medicine)  Robert Light MD as PCP - Internal Medicine (Internal Medicine)  Darrell Mosqueda MD as Consulting Physician (Cardiology)  Pete Reis MD as Consulting Physician (Allergy and Immunology)    Smoking Status:  Social History     Tobacco Use   Smoking Status Never Smoker   Smokeless Tobacco Never Used       Alcohol Consumption:  Social History     Substance and Sexual Activity   Alcohol Use No       Depression Screen:   PHQ-2/PHQ-9 Depression Screening 1/28/2021   Little interest or pleasure in doing things 0   Feeling down, depressed, or hopeless 0   Trouble falling or staying asleep, or sleeping too much 0   Feeling tired or having little energy 0   Poor appetite or overeating 0   Feeling bad about yourself - or that you are a failure or have let yourself or your family down 0   Trouble concentrating on things, such as reading the newspaper or watching television 0   Moving or speaking so slowly that other people could have noticed. Or the opposite - being so fidgety or restless that you have been moving around a lot more than usual 0   Thoughts that you would be better off dead, or of hurting yourself in some way 0   Total Score 0   If you checked off any problems, how difficult have these problems made it for you to do your work, take care of things at home, or get along with other people? Not difficult at all       Fall Risk Screen:  STEADI Fall Risk Assessment was completed, and patient is at LOW risk  for falls.Assessment completed on:1/28/2021    Health Habits and Functional and Cognitive Screening:  Functional & Cognitive Status 1/28/2021   Do you have difficulty preparing food and eating? No   Do you have difficulty bathing yourself, getting dressed or grooming yourself? No   Do you have difficulty using the toilet? No   Do you have difficulty moving around from place to place? No   Do you have trouble with steps or getting out of a bed or a chair? No   Current Diet Well Balanced Diet   Dental Exam Up to date   Eye Exam Up to date   Exercise (times per week) 7 times per week   Current Exercises Include Swimming   Current Exercise Activities Include -   Do you need help using the phone?  No   Are you deaf or do you have serious difficulty hearing?  No   Do you need help with transportation? No   Do you need help shopping? No   Do you need help preparing meals?  No   Do you need help with housework?  No   Do you need help with laundry? No   Do you need help taking your medications? No   Do you need help managing money? No   Do you ever drive or ride in a car without wearing a seat belt? No   Have you felt unusual stress, anger or loneliness in the last month? No   Who do you live with? Alone   If you need help, do you have trouble finding someone available to you? No   Have you been bothered in the last four weeks by sexual problems? No   Do you have difficulty concentrating, remembering or making decisions? Yes         Does the patient have evidence of cognitive impairment? No    Asprin use counseling:Taking ASA appropriately as indicated    Age-appropriate Screening Schedule:  Refer to the list below for future screening recommendations based on patient's age, sex and/or medical conditions. Orders for these recommended tests are listed in the plan section. The patient has been provided with a written plan.    Health Maintenance   Topic Date Due   • ZOSTER VACCINE (3 of 3) 02/27/2020   • LIPID PANEL  01/15/2022    • COLONOSCOPY  01/01/2025   • TDAP/TD VACCINES (2 - Td) 07/25/2028   • INFLUENZA VACCINE  Completed          The following portions of the patient's history were reviewed and updated as appropriate: allergies, current medications, past family history, past medical history, past social history, past surgical history and problem list.    Outpatient Medications Prior to Visit   Medication Sig Dispense Refill   • aspirin 81 MG EC tablet Take 81 mg by mouth Daily.     • atorvastatin (LIPITOR) 40 MG tablet TAKE 1 TABLET BY MOUTH EVERY MORNING 90 tablet 1   • finasteride (PROSCAR) 5 MG tablet Take 1 tablet by mouth daily.     • folic acid (FOLVITE) 1 MG tablet TAKE 1 TABLET BY MOUTH EVERY DAY 90 tablet 1   • levocetirizine (XYZAL) 5 MG tablet TAKE 1 TABLET BY MOUTH EVERY EVENING 90 tablet 0   • melatonin 5 MG tablet tablet Take 5 mg by mouth At Night As Needed for Sleep.     • montelukast (SINGULAIR) 10 MG tablet TAKE 1 TABLET BY MOUTH DAILY 90 tablet 1   • Multiple Vitamin (MULTI VITAMIN DAILY PO) Take 1 tablet by mouth Daily.     • pantoprazole (PROTONIX) 40 MG EC tablet TAKE 1 TABLET BY MOUTH EVERY MORNING 30 MINUTES BEFORE BREAKFAST 90 tablet 3   • sildenafil (REVATIO) 20 MG tablet Take 20 mg by mouth As Needed.     • vitamin D (ERGOCALCIFEROL) 1.25 MG (08071 UT) capsule capsule TAKE 1 CAPSULE BY MOUTH ONCE EVERY WEEK 13 capsule 3     No facility-administered medications prior to visit.        Patient Active Problem List   Diagnosis   • Allergic rhinitis due to pollen   • Asthma   • Gastroesophageal reflux disease   • Hypertension   • Obstructive sleep apnea syndrome   • Paroxysmal atrial fibrillation (CMS/HCC)   • Hypercoagulable state (CMS/HCC)   • Vitamin D deficiency   • Hyperlipidemia   • Coronary artery disease involving native coronary artery of native heart   • Stroke syndrome   • Benign prostatic hypertrophy   • Intraductal papillary mucinous neoplasm   • Deficiency of clotting factor (CMS/HCC)   • Anemia  "  • TMJ (temporomandibular joint disorder)   • Seasonal affective disorder (CMS/HCC)   • Heterozygous for MTHFR gene mutation       Advanced Care Planning:  ACP discussion was held with the patient during this visit. Patient has an advance directive (not in EMR), copy requested.    Review of Systems    Compared to one year ago, the patient feels his physical health is the same.  Compared to one year ago, the patient feels his mental health is the same.    Reviewed chart for potential of high risk medication in the elderly: yes  Reviewed chart for potential of harmful drug interactions in the elderly:yes    Objective         Vitals:    01/28/21 0931   BP: 114/72   Pulse: 57   Resp: 16   Temp: 96.8 °F (36 °C)   TempSrc: Infrared   SpO2: 99%   Weight: 72.1 kg (159 lb)   Height: 175.3 cm (69.02\")   PainSc: 0-No pain       Body mass index is 23.47 kg/m².  Discussed the patient's BMI with him. The BMI is in the acceptable range.    Physical Exam    Lab Results   Component Value Date    CHLPL 132 01/15/2021    TRIG 81 01/15/2021    HDL 57 01/15/2021    LDL 59 01/15/2021    VLDL 16 01/15/2021        Assessment/Plan   Medicare Risks and Personalized Health Plan  CMS Preventative Services Quick Reference  Advance Directive Discussion  Immunizations Discussed/Encouraged (specific immunizations; Shingrix )    The above risks/problems have been discussed with the patient.  Pertinent information has been shared with the patient in the After Visit Summary.  Follow up plans and orders are seen below in the Assessment/Plan Section.    There are no diagnoses linked to this encounter.  Follow Up:  No follow-ups on file.     An After Visit Summary and PPPS were given to the patient.             "

## 2021-02-09 ENCOUNTER — TELEPHONE (OUTPATIENT)
Dept: GASTROENTEROLOGY | Facility: CLINIC | Age: 66
End: 2021-02-09

## 2021-02-09 NOTE — TELEPHONE ENCOUNTER
----- Message from Tim Wu Rep sent at 2/9/2021 11:06 AM EST -----  Regarding: questions  Contact: [phone]  Pt has appt edmar/ Richard but would like to speak to someone about OTC med he can take til his appt.

## 2021-02-09 NOTE — TELEPHONE ENCOUNTER
Called pt and pt reports that he has been on daily pantoprazole for years.  He currently is having bloating , belching and having abd pain.  This came on all of a sudden.   Pt would like sooner appt.  Appt made for tomorrow at 145p with Dr Ramos.

## 2021-02-10 ENCOUNTER — OFFICE VISIT (OUTPATIENT)
Dept: GASTROENTEROLOGY | Facility: CLINIC | Age: 66
End: 2021-02-10

## 2021-02-10 ENCOUNTER — TELEPHONE (OUTPATIENT)
Dept: GASTROENTEROLOGY | Facility: CLINIC | Age: 66
End: 2021-02-10

## 2021-02-10 VITALS
HEIGHT: 69 IN | RESPIRATION RATE: 16 BRPM | TEMPERATURE: 96 F | BODY MASS INDEX: 23.4 KG/M2 | SYSTOLIC BLOOD PRESSURE: 134 MMHG | OXYGEN SATURATION: 97 % | WEIGHT: 158 LBS | HEART RATE: 58 BPM | DIASTOLIC BLOOD PRESSURE: 78 MMHG

## 2021-02-10 DIAGNOSIS — K86.89 PANCREATIC MASS: ICD-10-CM

## 2021-02-10 DIAGNOSIS — R10.10 PAIN OF UPPER ABDOMEN: ICD-10-CM

## 2021-02-10 DIAGNOSIS — Z12.11 ENCOUNTER FOR SCREENING FOR MALIGNANT NEOPLASM OF COLON: ICD-10-CM

## 2021-02-10 DIAGNOSIS — K86.2 PANCREATIC CYST: ICD-10-CM

## 2021-02-10 DIAGNOSIS — K21.9 GASTROESOPHAGEAL REFLUX DISEASE, UNSPECIFIED WHETHER ESOPHAGITIS PRESENT: Primary | Chronic | ICD-10-CM

## 2021-02-10 DIAGNOSIS — D49.0 IPMN (INTRADUCTAL PAPILLARY MUCINOUS NEOPLASM): ICD-10-CM

## 2021-02-10 DIAGNOSIS — K21.9 GASTROESOPHAGEAL REFLUX DISEASE: ICD-10-CM

## 2021-02-10 DIAGNOSIS — D37.9 NEOPLASM OF UNCERTAIN BEHAVIOR OF DIGESTIVE ORGAN, UNSPECIFIED: ICD-10-CM

## 2021-02-10 PROCEDURE — 99204 OFFICE O/P NEW MOD 45 MIN: CPT | Performed by: INTERNAL MEDICINE

## 2021-02-10 RX ORDER — PANTOPRAZOLE SODIUM 40 MG/1
TABLET, DELAYED RELEASE ORAL
Qty: 90 TABLET | Refills: 3 | Status: SHIPPED | OUTPATIENT
Start: 2021-02-10 | End: 2022-03-10 | Stop reason: SDUPTHER

## 2021-02-10 NOTE — PROGRESS NOTES
"Chief Complaint   Patient presents with   • Bloated     After eating on going for 3 weeks   • Gas   • Abdominal Pain     epigastric region       History of Present Illness:   65 y.o. male who c/o epigastric discomfort with bloating which is worse after eating. No change with exercise.   He swims lots at [employer]. He drinks one cup of coffee/day and some cold brew coffee/day. No nausea or vomiting. No fevers, chills. He takes one baby aspirin/day and rare Aleve. NO diarrhea or constipation. His weight has been stable. Nonsmoker. ETOH - none.        He last had an EGD by me in 5 of 2016 that was unrevealing.  He has seen Dr. Manuel Mason at  for a pancreatic IPMN that had been followed with periodic MRIs of the abdomen. The last time he had a colonoscopy was a long time ago.     Past Medical History:   Diagnosis Date   • Ankle swelling        Past Surgical History:   Procedure Laterality Date   • BRAIN SURGERY      2011-- Says \"Stents\"  put in brain--Dr Wong.   • CARDIAC CATHETERIZATION     • CHOLECYSTECTOMY     • COLONOSCOPY      Complete   • COLONOSCOPY      estimated date- 2014 with diverticulosis   • ENDOSCOPY N/A 5/13/2016    Procedure: ESOPHAGOGASTRODUODENOSCOPY with biopsies;  Surgeon: Blane Ramos MD;  Location: [facility] ENDOSCOPY;  Service:    • UPPER ENDOSCOPIC ULTRASOUND W/ FNA  01/29/2014    unclear if FNA performed          Current Outpatient Medications:   •  ALLERGY SERUM INJECTION, Inject  under the skin into the appropriate area as directed 1 (One) Time., Disp: , Rfl:   •  aspirin 81 MG EC tablet, Take 81 mg by mouth Daily., Disp: , Rfl:   •  atorvastatin (LIPITOR) 40 MG tablet, TAKE 1 TABLET BY MOUTH EVERY MORNING, Disp: 90 tablet, Rfl: 1  •  finasteride (PROSCAR) 5 MG tablet, Take 1 tablet by mouth daily., Disp: , Rfl:   •  folic acid (FOLVITE) 1 MG tablet, TAKE 1 TABLET BY MOUTH EVERY DAY, Disp: 90 tablet, Rfl: 1  •  levocetirizine (XYZAL) 5 MG tablet, TAKE 1 TABLET BY MOUTH EVERY EVENING, Disp: " 90 tablet, Rfl: 0  •  melatonin 5 MG tablet tablet, Take 5 mg by mouth At Night As Needed for Sleep., Disp: , Rfl:   •  montelukast (SINGULAIR) 10 MG tablet, TAKE 1 TABLET BY MOUTH DAILY, Disp: 90 tablet, Rfl: 1  •  Multiple Vitamin (MULTI VITAMIN DAILY PO), Take 1 tablet by mouth Daily., Disp: , Rfl:   •  pantoprazole (PROTONIX) 40 MG EC tablet, TAKE 1 TABLET BY MOUTH EVERY MORNING 30 MINUTES BEFORE BREAKFAST, Disp: 90 tablet, Rfl: 3  •  sildenafil (REVATIO) 20 MG tablet, Take 20 mg by mouth As Needed., Disp: , Rfl:   •  vitamin D (ERGOCALCIFEROL) 1.25 MG (33702 UT) capsule capsule, TAKE 1 CAPSULE BY MOUTH ONCE EVERY WEEK, Disp: 13 capsule, Rfl: 3    No Known Allergies    Family History   Problem Relation Age of Onset   • No Known Problems Mother         n/a   • No Known Problems Father         n/a   • No Known Problems Son    • No Known Problems Son        Social History     Socioeconomic History   • Marital status:      Spouse name: Not on file   • Number of children: Not on file   • Years of education: Not on file   • Highest education level: Not on file   Tobacco Use   • Smoking status: Never Smoker   • Smokeless tobacco: Never Used   Substance and Sexual Activity   • Alcohol use: No   • Drug use: No       Review of Systems   Gastrointestinal: Positive for abdominal distention and abdominal pain.     Pertinent positives and negatives documented in the HPI and all other systems reviewed and were found to be negative.  Vitals:    02/10/21 1406   BP: 134/78   Pulse: 58   Resp: 16   Temp: 96 °F (35.6 °C)   SpO2: 97%       Physical Exam  Vitals signs reviewed.   Constitutional:       General: He is not in acute distress.     Appearance: Normal appearance. He is well-developed. He is not diaphoretic.   HENT:      Head: Normocephalic and atraumatic. Hair is normal.      Right Ear: Hearing, tympanic membrane, ear canal and external ear normal.      Left Ear: Hearing, tympanic membrane, ear canal and external  ear normal.      Nose: Nose normal. No nasal deformity.      Mouth/Throat:      Mouth: Mucous membranes are moist. No oral lesions.      Pharynx: Uvula midline. No uvula swelling.   Eyes:      General: Lids are normal. No scleral icterus.        Right eye: No discharge.         Left eye: No discharge.      Extraocular Movements: Extraocular movements intact.      Right eye: Normal extraocular motion and no nystagmus.      Left eye: Normal extraocular motion and no nystagmus.      Conjunctiva/sclera: Conjunctivae normal.      Pupils: Pupils are equal, round, and reactive to light.   Neck:      Musculoskeletal: Normal range of motion and neck supple.      Thyroid: No thyromegaly.      Vascular: No JVD.   Cardiovascular:      Rate and Rhythm: Normal rate and regular rhythm.      Pulses: Normal pulses.      Heart sounds: Normal heart sounds. No murmur. No gallop.    Pulmonary:      Effort: Pulmonary effort is normal. No respiratory distress.      Breath sounds: Normal breath sounds. No wheezing or rales.   Chest:      Chest wall: No tenderness.   Abdominal:      General: Bowel sounds are normal. There is no distension.      Palpations: Abdomen is soft. There is no mass.      Tenderness: There is no abdominal tenderness. There is no guarding.      Hernia: No hernia is present.   Genitourinary:     Rectum: Normal. Guaiac result negative.   Musculoskeletal: Normal range of motion.         General: No tenderness or deformity.   Lymphadenopathy:      Cervical: No cervical adenopathy.   Skin:     General: Skin is warm and dry.      Findings: No rash.   Neurological:      Mental Status: He is alert and oriented to person, place, and time.      Cranial Nerves: No cranial nerve deficit.      Motor: No abnormal muscle tone.      Coordination: Coordination normal.      Deep Tendon Reflexes: Reflexes are normal and symmetric. Reflexes normal.   Psychiatric:         Mood and Affect: Mood normal.         Behavior: Behavior normal.          Thought Content: Thought content normal.         Judgment: Judgment normal.         Diagnoses and all orders for this visit:    1. Gastroesophageal reflux disease, unspecified whether esophagitis present (Primary)  -     Case Request; Standing  -     Case Request  -     Amylase  -     Lipase  -     CBC & Differential  -     Comprehensive Metabolic Panel  -     Celiac Ab tTG DGP TIgA  -     Cancer Antigen 19-9    2. IPMN (intraductal papillary mucinous neoplasm)  -     MRI Abdomen With & Without Contrast; Future  -     Case Request; Standing  -     Case Request  -     Amylase  -     Lipase  -     CBC & Differential  -     Comprehensive Metabolic Panel  -     Celiac Ab tTG DGP TIgA  -     Cancer Antigen 19-9    3. Pancreatic cyst  -     MRI Abdomen With & Without Contrast; Future  -     Case Request; Standing  -     Case Request  -     Amylase  -     Lipase  -     CBC & Differential  -     Comprehensive Metabolic Panel  -     Celiac Ab tTG DGP TIgA  -     Cancer Antigen 19-9    4. Encounter for screening for malignant neoplasm of colon  -     Case Request; Standing  -     Case Request  -     Amylase  -     Lipase  -     CBC & Differential  -     Comprehensive Metabolic Panel  -     Celiac Ab tTG DGP TIgA  -     Cancer Antigen 19-9    5. Pain of upper abdomen  -     Amylase  -     Lipase  -     CBC & Differential  -     Comprehensive Metabolic Panel  -     Celiac Ab tTG DGP TIgA  -     Cancer Antigen 19-9    6. Pancreatic mass  -     Amylase  -     Lipase  -     CBC & Differential  -     Comprehensive Metabolic Panel  -     Celiac Ab tTG DGP TIgA  -     Cancer Antigen 19-9    7. Neoplasm of uncertain behavior of digestive organ, unspecified   -     Cancer Antigen 19-9    Other orders  -     Follow Anesthesia Guidelines / Standing Orders; Future  -     Obtain Informed Consent; Future  -     Implement Anesthesia orders day of procedure.; Standing  -     Obtain informed consent; Standing  -     Verify bowel prep was  successful; Standing  -     Give tap water enema if bowel prep was insufficient; Standing      Assessment:  1. Epigastric discomfort with bloating.  2. H/o pancreatic IPMN - he had been seeing Dr. NOHELIA Mason at  for this.    Recommendations:  1. MRI of the pancreas  2. EGD and colonoscopy  3. Labs: CBC, CMP, amylase, lipase,     No follow-ups on file.    Blane Ramos MD  2/10/2021

## 2021-02-11 ENCOUNTER — TELEPHONE (OUTPATIENT)
Dept: GASTROENTEROLOGY | Facility: CLINIC | Age: 66
End: 2021-02-11

## 2021-02-11 LAB
ALBUMIN SERPL-MCNC: 4.3 G/DL (ref 3.5–5.2)
ALBUMIN/GLOB SERPL: 1.8 G/DL
ALP SERPL-CCNC: 100 U/L (ref 39–117)
ALT SERPL-CCNC: 25 U/L (ref 1–41)
AMYLASE SERPL-CCNC: 120 U/L (ref 28–100)
AST SERPL-CCNC: 38 U/L (ref 1–40)
BASOPHILS # BLD AUTO: 0.05 10*3/MM3 (ref 0–0.2)
BASOPHILS NFR BLD AUTO: 1 % (ref 0–1.5)
BILIRUB SERPL-MCNC: 0.4 MG/DL (ref 0–1.2)
BUN SERPL-MCNC: 14 MG/DL (ref 8–23)
BUN/CREAT SERPL: 14.1 (ref 7–25)
CALCIUM SERPL-MCNC: 9.2 MG/DL (ref 8.6–10.5)
CANCER AG19-9 SERPL-ACNC: 6 U/ML (ref 0–35)
CHLORIDE SERPL-SCNC: 105 MMOL/L (ref 98–107)
CO2 SERPL-SCNC: 29.4 MMOL/L (ref 22–29)
CREAT SERPL-MCNC: 0.99 MG/DL (ref 0.76–1.27)
EOSINOPHIL # BLD AUTO: 0.19 10*3/MM3 (ref 0–0.4)
EOSINOPHIL NFR BLD AUTO: 3.6 % (ref 0.3–6.2)
ERYTHROCYTE [DISTWIDTH] IN BLOOD BY AUTOMATED COUNT: 13.1 % (ref 12.3–15.4)
GLIADIN PEPTIDE IGA SER-ACNC: 4 UNITS (ref 0–19)
GLIADIN PEPTIDE IGG SER-ACNC: 2 UNITS (ref 0–19)
GLOBULIN SER CALC-MCNC: 2.4 GM/DL
GLUCOSE SERPL-MCNC: 81 MG/DL (ref 65–99)
HCT VFR BLD AUTO: 41.9 % (ref 37.5–51)
HGB BLD-MCNC: 13.5 G/DL (ref 13–17.7)
IGA SERPL-MCNC: 183 MG/DL (ref 61–437)
IMM GRANULOCYTES # BLD AUTO: 0.01 10*3/MM3 (ref 0–0.05)
IMM GRANULOCYTES NFR BLD AUTO: 0.2 % (ref 0–0.5)
LIPASE SERPL-CCNC: 6 U/L (ref 13–60)
LYMPHOCYTES # BLD AUTO: 1.63 10*3/MM3 (ref 0.7–3.1)
LYMPHOCYTES NFR BLD AUTO: 31.2 % (ref 19.6–45.3)
MCH RBC QN AUTO: 30.8 PG (ref 26.6–33)
MCHC RBC AUTO-ENTMCNC: 32.2 G/DL (ref 31.5–35.7)
MCV RBC AUTO: 95.7 FL (ref 79–97)
MONOCYTES # BLD AUTO: 0.66 10*3/MM3 (ref 0.1–0.9)
MONOCYTES NFR BLD AUTO: 12.6 % (ref 5–12)
NEUTROPHILS # BLD AUTO: 2.68 10*3/MM3 (ref 1.7–7)
NEUTROPHILS NFR BLD AUTO: 51.4 % (ref 42.7–76)
NRBC BLD AUTO-RTO: 0 /100 WBC (ref 0–0.2)
PLATELET # BLD AUTO: 187 10*3/MM3 (ref 140–450)
POTASSIUM SERPL-SCNC: 4.3 MMOL/L (ref 3.5–5.2)
PROT SERPL-MCNC: 6.7 G/DL (ref 6–8.5)
RBC # BLD AUTO: 4.38 10*6/MM3 (ref 4.14–5.8)
SODIUM SERPL-SCNC: 141 MMOL/L (ref 136–145)
TTG IGA SER-ACNC: <2 U/ML (ref 0–3)
TTG IGG SER-ACNC: 7 U/ML (ref 0–5)
WBC # BLD AUTO: 5.22 10*3/MM3 (ref 3.4–10.8)

## 2021-02-11 NOTE — PROGRESS NOTES
02/11/21       Tell him that his lab work looks mostly okay.  The CA 19-9 (pancreatic cancer tumor marker) came back normal at 6, which is good.  The amylase was just minimally elevated, which I am not worried about.  The lipase was normal.  The CBC and comprehensive metabolic profile came back normal.       We will see what the EGD, colonoscopy, and MRI of the pancreas shows?       Please fax a copy of this report to his PCP.  Rebecax. kjh

## 2021-02-11 NOTE — TELEPHONE ENCOUNTER
----- Message from Blane Ramos MD sent at 2/11/2021  9:19 AM EST -----  02/11/21       Tell him that his lab work looks mostly okay.  The CA 19-9 (pancreatic cancer tumor marker) came back normal at 6, which is good.  The amylase was just minimally elevated, which I am not worried about.  The lipase was normal.  The CBC and comprehensive metabolic profile came back normal.       We will see what the EGD, colonoscopy, and MRI of the pancreas shows?       Please fax a copy of this report to his PCP.  Betsy heart

## 2021-03-09 ENCOUNTER — HOSPITAL ENCOUNTER (OUTPATIENT)
Dept: MRI IMAGING | Facility: HOSPITAL | Age: 66
Discharge: HOME | End: 2021-03-09
Admitting: INTERNAL MEDICINE

## 2021-03-09 DIAGNOSIS — D49.0 IPMN (INTRADUCTAL PAPILLARY MUCINOUS NEOPLASM): ICD-10-CM

## 2021-03-09 DIAGNOSIS — K86.2 PANCREATIC CYST: ICD-10-CM

## 2021-03-09 PROCEDURE — 74183 MRI ABD W/O CNTR FLWD CNTR: CPT

## 2021-03-09 PROCEDURE — 82565 ASSAY OF CREATININE: CPT

## 2021-03-09 PROCEDURE — A9577 INJ MULTIHANCE: HCPCS | Performed by: INTERNAL MEDICINE

## 2021-03-09 PROCEDURE — 0 GADOBENATE DIMEGLUMINE 529 MG/ML SOLUTION: Performed by: INTERNAL MEDICINE

## 2021-03-09 RX ADMIN — GADOBENATE DIMEGLUMINE 14 ML: 529 INJECTION, SOLUTION INTRAVENOUS at 21:50

## 2021-03-10 LAB — CREAT BLDA-MCNC: 1 MG/DL (ref 0.6–1.3)

## 2021-03-16 ENCOUNTER — BULK ORDERING (OUTPATIENT)
Dept: CASE MANAGEMENT | Facility: OTHER | Age: 66
End: 2021-03-16

## 2021-03-16 DIAGNOSIS — Z23 IMMUNIZATION DUE: ICD-10-CM

## 2021-03-19 ENCOUNTER — TRANSCRIBE ORDERS (OUTPATIENT)
Dept: SLEEP MEDICINE | Facility: HOSPITAL | Age: 66
End: 2021-03-19

## 2021-03-19 DIAGNOSIS — Z01.818 OTHER SPECIFIED PRE-OPERATIVE EXAMINATION: Primary | ICD-10-CM

## 2021-03-19 NOTE — PROGRESS NOTES
03/19/21  Tell him that his MRI of the liver and pancreas (MRCP) is unchanged from 2015.  There is a cluster of tiny cystic foci in the pancreatic head and uncinate process that probably represent dilated side branches of the pancreatic duct.  The radiologist is recommending that we do a repeat MRCP in 2 years.  We can do that if the patient is interested in that.  If so then please order another MRCP to be done in 2 years because of the pancreatic cystic abnormalities.  We can discuss in more detail when I see him next.       We will see what the EGD and colonoscopy shows.       Please fax a copy of this report to his PCP.  Quan. kjh

## 2021-03-26 ENCOUNTER — TELEPHONE (OUTPATIENT)
Dept: GASTROENTEROLOGY | Facility: CLINIC | Age: 66
End: 2021-03-26

## 2021-03-27 ENCOUNTER — LAB (OUTPATIENT)
Dept: LAB | Facility: HOSPITAL | Age: 66
End: 2021-03-27

## 2021-03-27 DIAGNOSIS — Z01.818 OTHER SPECIFIED PRE-OPERATIVE EXAMINATION: ICD-10-CM

## 2021-03-27 PROCEDURE — U0004 COV-19 TEST NON-CDC HGH THRU: HCPCS

## 2021-03-27 PROCEDURE — U0005 INFEC AGEN DETEC AMPLI PROBE: HCPCS

## 2021-03-27 PROCEDURE — C9803 HOPD COVID-19 SPEC COLLECT: HCPCS

## 2021-03-29 LAB — SARS-COV-2 RNA RESP QL NAA+PROBE: NOT DETECTED

## 2021-03-30 ENCOUNTER — ANESTHESIA (OUTPATIENT)
Dept: GASTROENTEROLOGY | Facility: HOSPITAL | Age: 66
End: 2021-03-30

## 2021-03-30 ENCOUNTER — HOSPITAL ENCOUNTER (OUTPATIENT)
Facility: HOSPITAL | Age: 66
Setting detail: HOSPITAL OUTPATIENT SURGERY
Discharge: HOME | End: 2021-03-30
Attending: INTERNAL MEDICINE | Admitting: INTERNAL MEDICINE

## 2021-03-30 ENCOUNTER — ANESTHESIA EVENT (OUTPATIENT)
Dept: GASTROENTEROLOGY | Facility: HOSPITAL | Age: 66
End: 2021-03-30

## 2021-03-30 ENCOUNTER — TELEPHONE (OUTPATIENT)
Dept: INTERNAL MEDICINE | Facility: CLINIC | Age: 66
End: 2021-03-30

## 2021-03-30 VITALS
WEIGHT: 155.9 LBS | OXYGEN SATURATION: 97 % | RESPIRATION RATE: 20 BRPM | HEART RATE: 48 BPM | HEIGHT: 69 IN | DIASTOLIC BLOOD PRESSURE: 82 MMHG | BODY MASS INDEX: 23.09 KG/M2 | TEMPERATURE: 97.5 F | SYSTOLIC BLOOD PRESSURE: 141 MMHG

## 2021-03-30 DIAGNOSIS — Z12.11 ENCOUNTER FOR SCREENING FOR MALIGNANT NEOPLASM OF COLON: ICD-10-CM

## 2021-03-30 DIAGNOSIS — K86.2 PANCREATIC CYST: ICD-10-CM

## 2021-03-30 DIAGNOSIS — K21.9 GASTROESOPHAGEAL REFLUX DISEASE, UNSPECIFIED WHETHER ESOPHAGITIS PRESENT: ICD-10-CM

## 2021-03-30 DIAGNOSIS — D49.0 IPMN (INTRADUCTAL PAPILLARY MUCINOUS NEOPLASM): ICD-10-CM

## 2021-03-30 PROCEDURE — 88305 TISSUE EXAM BY PATHOLOGIST: CPT | Performed by: INTERNAL MEDICINE

## 2021-03-30 PROCEDURE — G0121 COLON CA SCRN NOT HI RSK IND: HCPCS | Performed by: INTERNAL MEDICINE

## 2021-03-30 PROCEDURE — 25010000002 PROPOFOL 10 MG/ML EMULSION: Performed by: NURSE ANESTHETIST, CERTIFIED REGISTERED

## 2021-03-30 PROCEDURE — 43239 EGD BIOPSY SINGLE/MULTIPLE: CPT | Performed by: INTERNAL MEDICINE

## 2021-03-30 RX ORDER — PROPOFOL 10 MG/ML
VIAL (ML) INTRAVENOUS AS NEEDED
Status: DISCONTINUED | OUTPATIENT
Start: 2021-03-30 | End: 2021-03-30 | Stop reason: SURG

## 2021-03-30 RX ORDER — PROPOFOL 10 MG/ML
VIAL (ML) INTRAVENOUS CONTINUOUS PRN
Status: DISCONTINUED | OUTPATIENT
Start: 2021-03-30 | End: 2021-03-30 | Stop reason: SURG

## 2021-03-30 RX ORDER — SODIUM CHLORIDE, SODIUM LACTATE, POTASSIUM CHLORIDE, CALCIUM CHLORIDE 600; 310; 30; 20 MG/100ML; MG/100ML; MG/100ML; MG/100ML
30 INJECTION, SOLUTION INTRAVENOUS CONTINUOUS PRN
Status: DISCONTINUED | OUTPATIENT
Start: 2021-03-30 | End: 2021-03-30 | Stop reason: HOSPADM

## 2021-03-30 RX ORDER — EPHEDRINE SULFATE 50 MG/ML
INJECTION, SOLUTION INTRAVENOUS AS NEEDED
Status: DISCONTINUED | OUTPATIENT
Start: 2021-03-30 | End: 2021-03-30 | Stop reason: SURG

## 2021-03-30 RX ORDER — LIDOCAINE HYDROCHLORIDE 20 MG/ML
INJECTION, SOLUTION INFILTRATION; PERINEURAL AS NEEDED
Status: DISCONTINUED | OUTPATIENT
Start: 2021-03-30 | End: 2021-03-30 | Stop reason: SURG

## 2021-03-30 RX ADMIN — EPHEDRINE SULFATE 10 MG: 50 INJECTION INTRAVENOUS at 14:00

## 2021-03-30 RX ADMIN — PROPOFOL 180 MCG/KG/MIN: 10 INJECTION, EMULSION INTRAVENOUS at 13:46

## 2021-03-30 RX ADMIN — LIDOCAINE HYDROCHLORIDE 50 MG: 20 INJECTION, SOLUTION INFILTRATION; PERINEURAL at 13:46

## 2021-03-30 RX ADMIN — SODIUM CHLORIDE, POTASSIUM CHLORIDE, SODIUM LACTATE AND CALCIUM CHLORIDE 30 ML/HR: 600; 310; 30; 20 INJECTION, SOLUTION INTRAVENOUS at 11:58

## 2021-03-30 RX ADMIN — PROPOFOL 100 MG: 10 INJECTION, EMULSION INTRAVENOUS at 13:46

## 2021-03-30 NOTE — ANESTHESIA POSTPROCEDURE EVALUATION
"Patient: Brian Barriga    Procedure Summary     Date: 03/30/21 Room / [facility]:  [facility]    Anesthesia Start: 1340 Anesthesia Stop: 1422    Procedures:       ESOPHAGOGASTRODUODENOSCOPY with biopsy (N/A Esophagus)      COLONOSCOPY to TI (N/A ) Diagnosis:       Gastroesophageal reflux disease, unspecified whether esophagitis present      IPMN (intraductal papillary mucinous neoplasm)      Pancreatic cyst      Encounter for screening for malignant neoplasm of colon      (Gastroesophageal reflux disease, unspecified whether esophagitis present [K21.9])      (IPMN (intraductal papillary mucinous neoplasm) [D49.0])      (Pancreatic cyst [K86.2])      (Encounter for screening for malignant neoplasm of colon [Z12.11])    Surgeons: Blane Ramos MD Provider: Max Rollins MD    Anesthesia Type: MAC ASA Status: 3          Anesthesia Type: MAC    Vitals  Vitals Value Taken Time   /82 03/30/21 1440   Temp     Pulse 48 03/30/21 1440   Resp 20 03/30/21 1440   SpO2 97 % 03/30/21 1440           Post Anesthesia Care and Evaluation    Patient location during evaluation: bedside  Patient participation: complete - patient participated  Level of consciousness: awake and alert  Pain management: adequate  Airway patency: patent  Anesthetic complications: No anesthetic complications    Cardiovascular status: acceptable  Respiratory status: acceptable  Hydration status: acceptable    Comments: /82 (BP Location: Left arm, Patient Position: Lying)   Pulse (!) 48   Temp 36.4 °C (97.5 °F) (Oral)   Resp 20   Ht 175.3 cm (69\")   Wt 70.7 kg (155 lb 14.4 oz)   SpO2 97%   BMI 23.02 kg/m²       "

## 2021-03-30 NOTE — TELEPHONE ENCOUNTER
----- Message from Robert Light MD sent at 3/30/2021  4:10 PM EDT -----    Call Dr. Mosqueda's office.  Patient had a normal 24-hour Holter in 2018.  There is no record of atrial fibrillation in my records although he mentions it in his notes.    ----- Message -----  From: Tiffani Caputo MA  Sent: 3/30/2021   3:59 PM EDT  To: Robert Light MD

## 2021-03-30 NOTE — ANESTHESIA PREPROCEDURE EVALUATION
Anesthesia Evaluation     Patient summary reviewed and Nursing notes reviewed                Airway   Mallampati: I  TM distance: >3 FB  Neck ROM: full  No difficulty expected  Dental - normal exam     Pulmonary - normal exam   (+) asthma,sleep apnea,   Cardiovascular - normal exam    (+) hypertension, CAD, dysrhythmias Paroxysmal Atrial Fib, hyperlipidemia,       Neuro/Psych  (+) CVA, psychiatric history,     GI/Hepatic/Renal/Endo    (+)  GERD,      Musculoskeletal (-) negative ROS    Abdominal  - normal exam    Bowel sounds: normal.   Substance History - negative use     OB/GYN negative ob/gyn ROS         Other                        Anesthesia Plan    ASA 3     MAC       Anesthetic plan, all risks, benefits, and alternatives have been provided, discussed and informed consent has been obtained with: patient.

## 2021-03-31 LAB
LAB AP CASE REPORT: NORMAL
PATH REPORT.FINAL DX SPEC: NORMAL
PATH REPORT.GROSS SPEC: NORMAL

## 2021-03-31 NOTE — TELEPHONE ENCOUNTER
Lakshmi with Dr. Mosqueda's office will let Dr. Mosqueda know. If he has any questions, he will contact Dr. Light.

## 2021-04-15 ENCOUNTER — TELEPHONE (OUTPATIENT)
Dept: GASTROENTEROLOGY | Facility: CLINIC | Age: 66
End: 2021-04-15

## 2021-04-28 ENCOUNTER — OFFICE VISIT (OUTPATIENT)
Dept: INTERNAL MEDICINE | Facility: CLINIC | Age: 66
End: 2021-04-28

## 2021-04-28 VITALS
OXYGEN SATURATION: 98 % | BODY MASS INDEX: 23.55 KG/M2 | HEART RATE: 51 BPM | DIASTOLIC BLOOD PRESSURE: 68 MMHG | SYSTOLIC BLOOD PRESSURE: 108 MMHG | WEIGHT: 159 LBS | HEIGHT: 69 IN | RESPIRATION RATE: 16 BRPM | TEMPERATURE: 97.7 F

## 2021-04-28 DIAGNOSIS — I10 ESSENTIAL HYPERTENSION: Primary | Chronic | ICD-10-CM

## 2021-04-28 DIAGNOSIS — I25.10 CORONARY ARTERY DISEASE INVOLVING NATIVE CORONARY ARTERY OF NATIVE HEART WITHOUT ANGINA PECTORIS: ICD-10-CM

## 2021-04-28 DIAGNOSIS — E78.5 HYPERLIPIDEMIA, UNSPECIFIED HYPERLIPIDEMIA TYPE: Chronic | ICD-10-CM

## 2021-04-28 PROCEDURE — 99214 OFFICE O/P EST MOD 30 MIN: CPT | Performed by: INTERNAL MEDICINE

## 2021-04-28 NOTE — PROGRESS NOTES
Subjective   Brian Barriga is a 65 y.o. male.     Chief Complaint   Patient presents with   • Hypertension         History of MTHFR gene mutation.  History of NSAID which is been stable.    Hypertension  This is a chronic problem. The current episode started more than 1 year ago. The problem is unchanged. The problem is controlled. Associated symptoms include chest pain and malaise/fatigue. Pertinent negatives include no headaches, palpitations, peripheral edema or shortness of breath. There are no associated agents to hypertension. Risk factors for coronary artery disease include dyslipidemia and male gender. Current antihypertension treatment includes beta blockers. The current treatment provides significant improvement. There are no compliance problems.  Hypertensive end-organ damage includes CAD/MI and CVA. There is no history of angina or kidney disease.   Hyperlipidemia  This is a chronic problem. The current episode started more than 1 year ago. The problem is controlled. Recent lipid tests were reviewed and are normal. Factors aggravating his hyperlipidemia include beta blockers. Associated symptoms include chest pain. Pertinent negatives include no shortness of breath. Current antihyperlipidemic treatment includes statins. The current treatment provides significant improvement of lipids. There are no compliance problems.  Risk factors for coronary artery disease include dyslipidemia, hypertension and male sex.   Coronary Artery Disease  Presents for follow-up visit. Symptoms include chest pain and leg swelling. Pertinent negatives include no chest pressure, chest tightness, palpitations or shortness of breath. Risk factors include hyperlipidemia. The symptoms have been resolved. Compliance with diet is good. Compliance with exercise is good. Compliance with medications is good.   Atrial Fibrillation  Presents for follow-up visit. Symptoms include chest pain. Symptoms are negative for palpitations and  shortness of breath. The symptoms have been stable. Past medical history includes atrial fibrillation, CAD and hyperlipidemia. There are no medication compliance problems.   Hyperglycemia  This is a chronic problem. The current episode started more than 1 month ago. Associated symptoms include abdominal pain (gerd), chest pain and fatigue. Pertinent negatives include no coughing or headaches.        The following portions of the patient's history were reviewed and updated as appropriate: allergies, current medications, past social history and problem list.    Outpatient Medications Marked as Taking for the 4/28/21 encounter (Office Visit) with Robert Light MD   Medication Sig Dispense Refill   • ALLERGY SERUM INJECTION Inject  under the skin into the appropriate area as directed 1 (One) Time.     • aspirin 81 MG EC tablet Take 81 mg by mouth Daily.     • atorvastatin (LIPITOR) 40 MG tablet TAKE 1 TABLET BY MOUTH EVERY MORNING 90 tablet 1   • finasteride (PROSCAR) 5 MG tablet Take 1 tablet by mouth daily.     • folic acid (FOLVITE) 1 MG tablet TAKE 1 TABLET BY MOUTH EVERY DAY 90 tablet 1   • levocetirizine (XYZAL) 5 MG tablet TAKE 1 TABLET BY MOUTH EVERY EVENING 90 tablet 0   • melatonin 5 MG tablet tablet Take 5 mg by mouth At Night As Needed for Sleep.     • montelukast (SINGULAIR) 10 MG tablet TAKE 1 TABLET BY MOUTH DAILY 90 tablet 1   • Multiple Vitamin (MULTI VITAMIN DAILY PO) Take 1 tablet by mouth Daily.     • pantoprazole (PROTONIX) 40 MG EC tablet TAKE 1 TABLET BY MOUTH EVERY MORNING 30 MINUTES BEFORE BREAKFAST 90 tablet 3   • sildenafil (REVATIO) 20 MG tablet Take 20 mg by mouth As Needed.     • vitamin D (ERGOCALCIFEROL) 1.25 MG (82033 UT) capsule capsule TAKE 1 CAPSULE BY MOUTH ONCE EVERY WEEK 13 capsule 3       Review of Systems   Constitutional: Positive for fatigue and malaise/fatigue.   Respiratory: Negative for cough, chest tightness and shortness of breath.    Cardiovascular: Positive for chest  pain and leg swelling. Negative for palpitations.   Gastrointestinal: Positive for abdominal pain (gerd). Negative for constipation and diarrhea.   Neurological: Negative for headaches.       Objective   Vitals:    04/28/21 0912   BP: 108/68   Pulse: 51   Resp: 16   Temp: 97.7 °F (36.5 °C)   SpO2: 98%          04/28/21 0912   Weight: 72.1 kg (159 lb)    [unfilled]  Body mass index is 23.48 kg/m².      Physical Exam   Constitutional: He appears well-developed.   Neck: No thyromegaly present.   Cardiovascular: Normal rate and normal heart sounds. An irregular rhythm present. Exam reveals no gallop.   No murmur heard.  Pulmonary/Chest: Effort normal and breath sounds normal. No respiratory distress. He has no wheezes. He has no rales.   Abdominal: Soft. Normal appearance and bowel sounds are normal. He exhibits no mass. There is no abdominal tenderness. There is no guarding.   Neurological: He is alert.         Problems Addressed this Visit        Cardiac and Vasculature    Hypertension - Primary (Chronic)    Hyperlipidemia (Chronic)    Coronary artery disease involving native coronary artery of native heart      Diagnoses       Codes Comments    Essential hypertension    -  Primary ICD-10-CM: I10  ICD-9-CM: 401.9     Hyperlipidemia, unspecified hyperlipidemia type     ICD-10-CM: E78.5  ICD-9-CM: 272.4     Coronary artery disease involving native coronary artery of native heart without angina pectoris     ICD-10-CM: I25.10  ICD-9-CM: 414.01         Assessment/Plan   In for recheck of multiple medical problems.  Hypertension, hyperlipidemia, CAD, PAF.  History of stroke and hypercoagulable state.  SAD.  Heterozygous MTHFR gene mutation.  Blood pressure control is excellent.  Lipids are excelllent.  No arrhythmias.   Had annual lab work December 2020.  Gets lipids every 3 months.  We will move out to 6 months on that.  Lipids have been very stable.  Urologist checks his PSA and prostate exam.  Has no trouble with  spin class and exercise.  Fatigue is still present but pretty mild right now.  Pneumovax due now as well as second Shingrix..  He has Medicare but is a federal plan so his preventive exam should be covered in the future.  SAD is stable.  History of MTHFR gene mutation.  Annual wellness visit November 2020.  Lots of allergies.  Some new ones.  Gets allergy shots and those have now been revamped.  Wants a new sleep doctor.  He is fasting today.    PPE today includes face mask and eye shield.         Dragon disclaimer:   Much of this encounter note is an electronic transcription/translation of spoken language to printed text. The electronic translation of spoken language may permit erroneous, or at times, nonsensical words or phrases to be inadvertently transcribed; Although I have reviewed the note for such errors, some may still exist.

## 2021-05-11 RX ORDER — ATORVASTATIN CALCIUM 40 MG/1
TABLET, FILM COATED ORAL
Qty: 90 TABLET | Refills: 1 | Status: SHIPPED | OUTPATIENT
Start: 2021-05-11 | End: 2021-11-01

## 2021-06-22 ENCOUNTER — TRANSCRIBE ORDERS (OUTPATIENT)
Dept: SLEEP MEDICINE | Facility: HOSPITAL | Age: 66
End: 2021-06-22

## 2021-06-22 DIAGNOSIS — G47.33 OBSTRUCTIVE SLEEP APNEA SYNDROME: ICD-10-CM

## 2021-06-22 DIAGNOSIS — I48.0 PAROXYSMAL ATRIAL FIBRILLATION (HCC): ICD-10-CM

## 2021-06-22 DIAGNOSIS — I10 ESSENTIAL HYPERTENSION: Primary | ICD-10-CM

## 2021-07-05 ENCOUNTER — HOSPITAL ENCOUNTER (OUTPATIENT)
Dept: SLEEP MEDICINE | Facility: HOSPITAL | Age: 66
Discharge: HOME | End: 2021-07-05
Admitting: INTERNAL MEDICINE

## 2021-07-05 DIAGNOSIS — G47.33 OBSTRUCTIVE SLEEP APNEA SYNDROME: ICD-10-CM

## 2021-07-05 DIAGNOSIS — I10 ESSENTIAL HYPERTENSION: ICD-10-CM

## 2021-07-05 DIAGNOSIS — I48.0 PAROXYSMAL ATRIAL FIBRILLATION (HCC): ICD-10-CM

## 2021-07-05 PROCEDURE — 95810 POLYSOM 6/> YRS 4/> PARAM: CPT

## 2021-07-14 ENCOUNTER — TELEPHONE (OUTPATIENT)
Dept: SLEEP MEDICINE | Facility: HOSPITAL | Age: 66
End: 2021-07-14

## 2021-08-09 DIAGNOSIS — E78.5 HYPERLIPIDEMIA: ICD-10-CM

## 2021-08-09 RX ORDER — FOLIC ACID 1 MG/1
TABLET ORAL
Qty: 90 TABLET | Refills: 1 | Status: SHIPPED | OUTPATIENT
Start: 2021-08-09 | End: 2022-01-19

## 2021-08-31 ENCOUNTER — TRANSCRIBE ORDERS (OUTPATIENT)
Dept: SLEEP MEDICINE | Facility: HOSPITAL | Age: 66
End: 2021-08-31

## 2021-08-31 DIAGNOSIS — G47.33 OSA (OBSTRUCTIVE SLEEP APNEA): Primary | ICD-10-CM

## 2021-09-07 ENCOUNTER — HOSPITAL ENCOUNTER (OUTPATIENT)
Dept: SLEEP MEDICINE | Facility: HOSPITAL | Age: 66
Discharge: HOME | End: 2021-09-07
Admitting: INTERNAL MEDICINE

## 2021-09-07 DIAGNOSIS — G47.33 OSA (OBSTRUCTIVE SLEEP APNEA): ICD-10-CM

## 2021-09-07 PROCEDURE — 95810 POLYSOM 6/> YRS 4/> PARAM: CPT

## 2021-09-15 ENCOUNTER — TELEPHONE (OUTPATIENT)
Dept: SLEEP MEDICINE | Facility: HOSPITAL | Age: 66
End: 2021-09-15

## 2021-09-17 ENCOUNTER — PATIENT MESSAGE (OUTPATIENT)
Dept: INTERNAL MEDICINE | Facility: CLINIC | Age: 66
End: 2021-09-17

## 2021-09-17 ENCOUNTER — TELEPHONE (OUTPATIENT)
Dept: INTERNAL MEDICINE | Facility: CLINIC | Age: 66
End: 2021-09-17

## 2021-09-17 DIAGNOSIS — R53.83 FATIGUE, UNSPECIFIED TYPE: Primary | ICD-10-CM

## 2021-09-17 NOTE — TELEPHONE ENCOUNTER
Call patient. I am happy to move up his visit if he would like. We can add a CBC and folic acid level to his lab work. Nonetheless this is very low yield. I think is best to move his to see Dr. Freedman and see what he thinks.      ----- Message from Joana Fortune MA sent at 9/17/2021  2:45 PM EDT -----  Regarding: FW: Non-Urgent Medical Question  Contact: [phone]  Please Advise     ----- Message -----  From: Brian Farr  Sent: 9/17/2021   2:37 PM EDT  To: [facility]  Subject: Non-Urgent Medical Question                      Dear Dr. Light,  As you can see in my test results, I had a sleep study done on 7/5/21 using my oral appliance. Another study on 9/7/21. I find the latest test results confusing but have not had a follow-up with JEFF for an explanation.   I continue to suffer tiredness and wonder if there is an issue with iron or the folic acid being absorbed.   Would you recommend moving up my lipid panel and adding a cbc or other tests or is the issue something I should address with Dr. Tano monahan LPC or my cardiologist?    thank you,  brian farr

## 2021-10-05 ENCOUNTER — OFFICE VISIT (OUTPATIENT)
Dept: INTERNAL MEDICINE | Facility: CLINIC | Age: 66
End: 2021-10-05

## 2021-10-05 VITALS
BODY MASS INDEX: 22.81 KG/M2 | HEIGHT: 69 IN | DIASTOLIC BLOOD PRESSURE: 82 MMHG | HEART RATE: 51 BPM | WEIGHT: 154 LBS | SYSTOLIC BLOOD PRESSURE: 132 MMHG | TEMPERATURE: 97.8 F | OXYGEN SATURATION: 98 % | RESPIRATION RATE: 16 BRPM

## 2021-10-05 DIAGNOSIS — G25.81 RLS (RESTLESS LEGS SYNDROME): ICD-10-CM

## 2021-10-05 DIAGNOSIS — I25.10 CORONARY ARTERY DISEASE INVOLVING NATIVE CORONARY ARTERY OF NATIVE HEART WITHOUT ANGINA PECTORIS: ICD-10-CM

## 2021-10-05 DIAGNOSIS — D64.9 ANEMIA, UNSPECIFIED TYPE: ICD-10-CM

## 2021-10-05 DIAGNOSIS — I10 PRIMARY HYPERTENSION: Primary | Chronic | ICD-10-CM

## 2021-10-05 DIAGNOSIS — E78.5 HYPERLIPIDEMIA, UNSPECIFIED HYPERLIPIDEMIA TYPE: Chronic | ICD-10-CM

## 2021-10-05 DIAGNOSIS — I48.0 PAROXYSMAL ATRIAL FIBRILLATION (HCC): ICD-10-CM

## 2021-10-05 DIAGNOSIS — N40.0 BENIGN PROSTATIC HYPERPLASIA, UNSPECIFIED WHETHER LOWER URINARY TRACT SYMPTOMS PRESENT: ICD-10-CM

## 2021-10-05 PROCEDURE — 99214 OFFICE O/P EST MOD 30 MIN: CPT | Performed by: INTERNAL MEDICINE

## 2021-10-05 RX ORDER — NETARSUDIL AND LATANOPROST OPHTHALMIC SOLUTION, 0.02%/0.005% .2; .05 MG/ML; MG/ML
SOLUTION/ DROPS OPHTHALMIC; TOPICAL
COMMUNITY
End: 2021-10-28

## 2021-10-05 NOTE — PROGRESS NOTES
Subjective   Brian Barriga is a 66 y.o. male.     Chief Complaint   Patient presents with   • Restless Legs Syndrome   • Sleep Apnea     LPC sleep study showed no ANNMARIE   • Neuralgia     facial neuralgia x 2 weeks         History of MTHFR gene mutation.  History of NSAID which is been stable.    Sleep Apnea  Associated symptoms include fatigue. Pertinent negatives include no abdominal pain, chest pain, coughing, diaphoresis, fever, headaches, vertigo or visual change.   Neurologic Problem  The patient's pertinent negatives include no altered mental status, clumsiness, focal sensory loss, focal weakness, loss of balance, memory loss, near-syncope, slurred speech or visual change. This is a new problem. The current episode started 1 to 4 weeks ago. The neurological problem developed suddenly. The problem has been gradually improving since onset. There was facial focality noted. Associated symptoms include fatigue. Pertinent negatives include no abdominal pain, auditory change, aura, bladder incontinence, bowel incontinence, chest pain, diaphoresis, fever, headaches, palpitations, shortness of breath or vertigo. Past treatments include acetaminophen. The treatment provided no relief.   Hypertension  This is a chronic problem. The current episode started more than 1 year ago. The problem is unchanged. The problem is controlled. Associated symptoms include malaise/fatigue. Pertinent negatives include no chest pain, headaches, palpitations, peripheral edema or shortness of breath. There are no associated agents to hypertension. Risk factors for coronary artery disease include dyslipidemia and male gender. Current antihypertension treatment includes beta blockers. The current treatment provides significant improvement. There are no compliance problems.  Hypertensive end-organ damage includes CAD/MI and CVA. There is no history of angina or kidney disease. Identifiable causes of hypertension include sleep apnea.    Hyperlipidemia  This is a chronic problem. The current episode started more than 1 year ago. The problem is controlled. Recent lipid tests were reviewed and are normal. Factors aggravating his hyperlipidemia include beta blockers. Pertinent negatives include no chest pain, focal sensory loss, focal weakness or shortness of breath. Current antihyperlipidemic treatment includes statins. The current treatment provides significant improvement of lipids. There are no compliance problems.  Risk factors for coronary artery disease include dyslipidemia, hypertension and male sex.   Coronary Artery Disease  Presents for follow-up visit. Pertinent negatives include no chest pain, chest pressure, chest tightness, leg swelling, palpitations or shortness of breath. Risk factors include hyperlipidemia. The symptoms have been resolved. Compliance with diet is good. Compliance with exercise is good. Compliance with medications is good.   Atrial Fibrillation  Presents for follow-up visit. Symptoms are negative for chest pain, palpitations and shortness of breath. The symptoms have been stable. Past medical history includes atrial fibrillation, CAD and hyperlipidemia. There are no medication compliance problems.   Hyperglycemia  This is a chronic problem. The current episode started more than 1 month ago. Associated symptoms include fatigue. Pertinent negatives include no abdominal pain, chest pain, coughing, diaphoresis, fever, headaches, vertigo or visual change.        The following portions of the patient's history were reviewed and updated as appropriate: allergies, current medications, past social history and problem list.    Outpatient Medications Marked as Taking for the 10/5/21 encounter (Office Visit) with Robert Light MD   Medication Sig Dispense Refill   • ALLERGY SERUM INJECTION Inject  under the skin into the appropriate area as directed 1 (One) Time.     • aspirin 81 MG EC tablet Take 81 mg by mouth Daily.     •  atorvastatin (LIPITOR) 40 MG tablet TAKE 1 TABLET BY MOUTH EVERY MORNING 90 tablet 1   • finasteride (PROSCAR) 5 MG tablet Take 1 tablet by mouth daily.     • folic acid (FOLVITE) 1 MG tablet TAKE 1 TABLET BY MOUTH EVERY DAY 90 tablet 1   • levocetirizine (XYZAL) 5 MG tablet TAKE 1 TABLET BY MOUTH EVERY EVENING 90 tablet 0   • melatonin 5 MG tablet tablet Take 5 mg by mouth At Night As Needed for Sleep.     • montelukast (SINGULAIR) 10 MG tablet TAKE 1 TABLET BY MOUTH DAILY 90 tablet 1   • Multiple Vitamin (MULTI VITAMIN DAILY PO) Take 1 tablet by mouth Daily.     • Netarsudil-Latanoprost (Rocklatan) 0.02-0.005 % solution Apply  to eye(s) as directed by provider.     • pantoprazole (PROTONIX) 40 MG EC tablet TAKE 1 TABLET BY MOUTH EVERY MORNING 30 MINUTES BEFORE BREAKFAST 90 tablet 3   • sildenafil (REVATIO) 20 MG tablet Take 20 mg by mouth As Needed.     • vitamin D (ERGOCALCIFEROL) 1.25 MG (86349 UT) capsule capsule TAKE 1 CAPSULE BY MOUTH ONCE EVERY WEEK 13 capsule 3       Review of Systems   Constitutional: Positive for fatigue and malaise/fatigue. Negative for diaphoresis and fever.   Respiratory: Negative for cough, chest tightness and shortness of breath.    Cardiovascular: Negative for chest pain, palpitations, leg swelling and near-syncope.   Gastrointestinal: Negative for abdominal pain, bowel incontinence, constipation and diarrhea.   Genitourinary: Negative for bladder incontinence.   Neurological: Negative for vertigo, focal weakness, headaches and loss of balance.   Psychiatric/Behavioral: Negative for memory loss.       Objective   Vitals:    10/05/21 1027   BP: 132/82   Pulse: 51   Resp: 16   Temp: 97.8 °F (36.6 °C)   SpO2: 98%          10/05/21  1027   Weight: 69.9 kg (154 lb)    [unfilled]  Body mass index is 22.73 kg/m².      Physical Exam   Constitutional: He appears well-developed.   Neck: No thyromegaly present.   Cardiovascular: Normal rate and normal heart sounds. An irregular rhythm  present. Exam reveals no gallop.   No murmur heard.  Pulmonary/Chest: Effort normal and breath sounds normal. No respiratory distress. He has no wheezes. He has no rales.   Abdominal: Soft. Normal appearance and bowel sounds are normal. He exhibits no mass. There is no abdominal tenderness. There is no guarding.   Neurological: He is alert.         Problems Addressed this Visit        Cardiac and Vasculature    Hypertension - Primary (Chronic)    Hyperlipidemia (Chronic)    Paroxysmal atrial fibrillation (HCC)    Coronary artery disease involving native coronary artery of native heart       Genitourinary and Reproductive     Benign prostatic hypertrophy      Other Visit Diagnoses     RLS (restless legs syndrome)          Diagnoses       Codes Comments    Primary hypertension    -  Primary ICD-10-CM: I10  ICD-9-CM: 401.9     Hyperlipidemia, unspecified hyperlipidemia type     ICD-10-CM: E78.5  ICD-9-CM: 272.4     Paroxysmal atrial fibrillation (HCC)     ICD-10-CM: I48.0  ICD-9-CM: 427.31     Coronary artery disease involving native coronary artery of native heart without angina pectoris     ICD-10-CM: I25.10  ICD-9-CM: 414.01     Benign prostatic hyperplasia, unspecified whether lower urinary tract symptoms present     ICD-10-CM: N40.0  ICD-9-CM: 600.00     RLS (restless legs syndrome)     ICD-10-CM: G25.81  ICD-9-CM: 333.94         Assessment/Plan   In for recheck of multiple medical problems.  Hypertension, hyperlipidemia, CAD, PAF.  History of stroke and hypercoagulable state.  SAD.  Heterozygous MTHFR gene mutation.  Blood pressure control is excellent.  No arrhythmias.   Had annual lab work December 2020.  Gets lipids every 6 months.  Also needs an iron profile today to more fully evaluate his restless leg symptoms.  We will move out to 6 months on that.  Lipids have been very stable.  Urologist checks his PSA and prostate exam.  Has no trouble with spin class and exercise.  Fatigue is still present.  Pneumovax  due now as well as second Shingrix.. Will get the Pneumovax when he comes back for his flu shot.  Plans on his third Covid vaccine tomorrow.  He has Medicare but is a federal plan so his preventive exam should be covered in the future.  SAD is stable.  History of MTHFR gene mutation.  Annual wellness visit November 2020.  In the past 2 weeks he has had symptoms that are are quite suggestive of trigeminal neuralgia.  He has had hypersensitivity on his eye, teeth, and right side of his face.  This all seems to fit with the second branch of the right trigeminal nerve.  It is already improving spontaneously.  I am not sure any additional evaluation is necessary.  No treatment indicated at the present time since he is improving.  He has TSH, free T4, folic acid, B12, and CBC pending for today.  Also lipids are pending.  We will add an iron profile.  PSA.  He is fasting today.    The above information was reviewed again today 10/05/21.  It continues to be accurate as reflected above and is unchanged.  History, physical and review of systems all reviewed and are unchanged.  Medications were reviewed today and continue the current dosing.    PPE today includes face mask and eye shield.           Dragon disclaimer:   Much of this encounter note is an electronic transcription/translation of spoken language to printed text. The electronic translation of spoken language may permit erroneous, or at times, nonsensical words or phrases to be inadvertently transcribed; Although I have reviewed the note for such errors, some may still exist.           Answers for HPI/ROS submitted by the patient on 10/5/2021  What is the primary reason for your visit?: Neurological Problem

## 2021-10-05 NOTE — PATIENT INSTRUCTIONS
Pneumococcal Polysaccharide Vaccine (PPSV23): What You Need to Know  1. Why get vaccinated?  Pneumococcal polysaccharide vaccine (PPSV23) can prevent pneumococcal disease.  Pneumococcal disease refers to any illness caused by pneumococcal bacteria. These bacteria can cause many types of illnesses, including pneumonia, which is an infection of the lungs. Pneumococcal bacteria are one of the most common causes of pneumonia.  Besides pneumonia, pneumococcal bacteria can also cause:  · Ear infections  · Sinus infections  · Meningitis (infection of the tissue covering the brain and spinal cord)  · Bacteremia (bloodstream infection)  Anyone can get pneumococcal disease, but children under 2 years of age, people with certain medical conditions, adults 65 years or older, and cigarette smokers are at the highest risk.  Most pneumococcal infections are mild. However, some can result in long-term problems, such as brain damage or hearing loss. Meningitis, bacteremia, and pneumonia caused by pneumococcal disease can be fatal.  2. PPSV23  PPSV23 protects against 23 types of bacteria that cause pneumococcal disease.  PPSV23 is recommended for:  · All adults 65 years or older,  · Anyone 2 years or older with certain medical conditions that can lead to an increased risk for pneumococcal disease.  Most people need only one dose of PPSV23. A second dose of PPSV23, and another type of pneumococcal vaccine called PCV13, are recommended for certain high-risk groups. Your health care provider can give you more information.  People 65 years or older should get a dose of PPSV23 even if they have already gotten one or more doses of the vaccine before they turned 65.  3. Talk with your health care provider  Tell your vaccine provider if the person getting the vaccine:  · Has had an allergic reaction after a previous dose of PPSV23, or has any severe, life-threatening allergies.  In some cases, your health care provider may decide to postpone  PPSV23 vaccination to a future visit.  People with minor illnesses, such as a cold, may be vaccinated. People who are moderately or severely ill should usually wait until they recover before getting PPSV23.  Your health care provider can give you more information.  4. Risks of a vaccine reaction  · Redness or pain where the shot is given, feeling tired, fever, or muscle aches can happen after PPSV23.  People sometimes faint after medical procedures, including vaccination. Tell your provider if you feel dizzy or have vision changes or ringing in the ears.  As with any medicine, there is a very remote chance of a vaccine causing a severe allergic reaction, other serious injury, or death.  5. What if there is a serious problem?  An allergic reaction could occur after the vaccinated person leaves the clinic. If you see signs of a severe allergic reaction (hives, swelling of the face and throat, difficulty breathing, a fast heartbeat, dizziness, or weakness), call 9-1-1 and get the person to the nearest hospital.  For other signs that concern you, call your health care provider.  Adverse reactions should be reported to the Vaccine Adverse Event Reporting System (VAERS). Your health care provider will usually file this report, or you can do it yourself. Visit the VAERS website at www.vaers.hhs.gov or call 1-799.232.9521. VAERS is only for reporting reactions, and VAERS staff do not give medical advice.  6. How can I learn more?  · Ask your health care provider.  · Call your local or state health department.  · Contact the Centers for Disease Control and Prevention (CDC):  ? Call 1-132.766.2914 (6-570-QZB-INFO) or  ? Visit CDC's website at www.cdc.gov/vaccines  Vaccine Information Statement PPSV23 Vaccine (10/30/2019)  This information is not intended to replace advice given to you by your health care provider. Make sure you discuss any questions you have with your health care provider.  Document Revised: 12/07/2020  Document Reviewed: 12/07/2020  [employer] Patient Education © 2021 [employer]  Recombinant Zoster (Shingles) Vaccine: What You Need to Know  1. Why get vaccinated?  Recombinant zoster (shingles) vaccine can prevent shingles.  Shingles (also called herpes zoster, or just zoster) is a painful skin rash, usually with blisters. In addition to the rash, shingles can cause fever, headache, chills, or upset stomach. More rarely, shingles can lead to pneumonia, hearing problems, blindness, brain inflammation (encephalitis), or death.  The most common complication of shingles is long-term nerve pain called postherpetic neuralgia (PHN). PHN occurs in the areas where the shingles rash was, even after the rash clears up. It can last for months or years after the rash goes away. The pain from PHN can be severe and debilitating.  About 10 to 18% of people who get shingles will experience PHN. The risk of PHN increases with age. An older adult with shingles is more likely to develop PHN and have longer lasting and more severe pain than a younger person with shingles.  Shingles is caused by the varicella zoster virus, the same virus that causes chickenpox. After you have chickenpox, the virus stays in your body and can cause shingles later in life. Shingles cannot be passed from one person to another, but the virus that causes shingles can spread and cause chickenpox in someone who had never had chickenpox or received chickenpox vaccine.  2. Recombinant shingles vaccine  Recombinant shingles vaccine provides strong protection against shingles. By preventing shingles, recombinant shingles vaccine also protects against PHN.  Recombinant shingles vaccine is the preferred vaccine for the prevention of shingles. However, a different vaccine, live shingles vaccine, may be used in some circumstances.  The recombinant shingles vaccine is recommended for adults 50 years and older without serious immune problems. It is given as a two-dose  series.  This vaccine is also recommended for people who have already gotten another type of shingles vaccine, the live shingles vaccine. There is no live virus in this vaccine.  Shingles vaccine may be given at the same time as other vaccines.  3. Talk with your health care provider  Tell your vaccine provider if the person getting the vaccine:  · Has had an allergic reaction after a previous dose of recombinant shingles vaccine, or has any severe, life-threatening allergies.  · Is pregnant or breastfeeding.  · Is currently experiencing an episode of shingles.  In some cases, your health care provider may decide to postpone shingles vaccination to a future visit.  People with minor illnesses, such as a cold, may be vaccinated. People who are moderately or severely ill should usually wait until they recover before getting recombinant shingles vaccine.  Your health care provider can give you more information.  4. Risks of a vaccine reaction  · A sore arm with mild or moderate pain is very common after recombinant shingles vaccine, affecting about 80% of vaccinated people. Redness and swelling can also happen at the site of the injection.  · Tiredness, muscle pain, headache, shivering, fever, stomach pain, and nausea happen after vaccination in more than half of people who receive recombinant shingles vaccine.  In clinical trials, about 1 out of 6 people who got recombinant zoster vaccine experienced side effects that prevented them from doing regular activities. Symptoms usually went away on their own in 2 to 3 days.  You should still get the second dose of recombinant zoster vaccine even if you had one of these reactions after the first dose.  People sometimes faint after medical procedures, including vaccination. Tell your provider if you feel dizzy or have vision changes or ringing in the ears.  As with any medicine, there is a very remote chance of a vaccine causing a severe allergic reaction, other serious  injury, or death.  5. What if there is a serious problem?  An allergic reaction could occur after the vaccinated person leaves the clinic. If you see signs of a severe allergic reaction (hives, swelling of the face and throat, difficulty breathing, a fast heartbeat, dizziness, or weakness), call 9-1-1 and get the person to the nearest hospital.  For other signs that concern you, call your health care provider.  Adverse reactions should be reported to the Vaccine Adverse Event Reporting System (VAERS). Your health care provider will usually file this report, or you can do it yourself. Visit the VAERS website at www.vaers.[facility].gov or call 1-274.322.4274. VAERS is only for reporting reactions, and VAERS staff do not give medical advice.  6. How can I learn more?  · Ask your health care provider.  · Call your local or state health department.  · Contact the Centers for Disease Control and Prevention (CDC):  ? Call 1-348.695.6272 (3-452-VGF-INFO) or  ? Visit CDC's website at www.cdc.gov/vaccines  Vaccine Information Statement Recombinant Zoster Vaccine (10/30/2019)  This information is not intended to replace advice given to you by your health care provider. Make sure you discuss any questions you have with your health care provider.  Document Revised: 12/09/2020 Document Reviewed: 12/09/2020  Elsevier Patient Education © 2021 [employer]  Influenza (Flu) Vaccine (Inactivated or Recombinant): What You Need to Know  1. Why get vaccinated?  Influenza vaccine can prevent influenza (flu).  Flu is a contagious disease that spreads around the [place] every year, usually between October and May. Anyone can get the flu, but it is more dangerous for some people. Infants and young children, people 65 years of age and older, pregnant women, and people with certain health conditions or a weakened immune system are at greatest risk of flu complications.  Pneumonia, bronchitis, sinus infections and ear infections are examples  of flu-related complications. If you have a medical condition, such as heart disease, cancer or diabetes, flu can make it worse.  Flu can cause fever and chills, sore throat, muscle aches, fatigue, cough, headache, and runny or stuffy nose. Some people may have vomiting and diarrhea, though this is more common in children than adults.  Each year thousands of people in the [place] die from flu, and many more are hospitalized. Flu vaccine prevents millions of illnesses and flu-related visits to the doctor each year.  2. Influenza vaccine  CDC recommends everyone 6 months of age and older get vaccinated every flu season. Children 6 months through 8 years of age may need 2 doses during a single flu season. Everyone else needs only 1 dose each flu season.  It takes about 2 weeks for protection to develop after vaccination.  There are many flu viruses, and they are always changing. Each year a new flu vaccine is made to protect against three or four viruses that are likely to cause disease in the upcoming flu season. Even when the vaccine doesn't exactly match these viruses, it may still provide some protection.  Influenza vaccine does not cause flu.  Influenza vaccine may be given at the same time as other vaccines.  3. Talk with your health care provider  Tell your vaccine provider if the person getting the vaccine:  · Has had an allergic reaction after a previous dose of influenza vaccine, or has any severe, life-threatening allergies.  · Has ever had Guillain-Barré Syndrome (also called GBS).  In some cases, your health care provider may decide to postpone influenza vaccination to a future visit.  People with minor illnesses, such as a cold, may be vaccinated. People who are moderately or severely ill should usually wait until they recover before getting influenza vaccine.  Your health care provider can give you more information.  4. Risks of a vaccine reaction  · Soreness, redness, and swelling where shot is  given, fever, muscle aches, and headache can happen after influenza vaccine.  · There may be a very small increased risk of Guillain-Barré Syndrome (GBS) after inactivated influenza vaccine (the flu shot).  Young children who get the flu shot along with pneumococcal vaccine (PCV13), and/or DTaP vaccine at the same time might be slightly more likely to have a seizure caused by fever. Tell your health care provider if a child who is getting flu vaccine has ever had a seizure.  People sometimes faint after medical procedures, including vaccination. Tell your provider if you feel dizzy or have vision changes or ringing in the ears.  As with any medicine, there is a very remote chance of a vaccine causing a severe allergic reaction, other serious injury, or death.  5. What if there is a serious problem?  An allergic reaction could occur after the vaccinated person leaves the clinic. If you see signs of a severe allergic reaction (hives, swelling of the face and throat, difficulty breathing, a fast heartbeat, dizziness, or weakness), call 9-1-1 and get the person to the nearest hospital.  For other signs that concern you, call your health care provider.  Adverse reactions should be reported to the Vaccine Adverse Event Reporting System (VAERS). Your health care provider will usually file this report, or you can do it yourself. Visit the VAERS website at www.vaers.hhs.gov or call 1-585.365.2298. VAERS is only for reporting reactions, and VAERS staff do not give medical advice.  6. The National Vaccine Injury Compensation Program  The National Vaccine Injury Compensation Program (VICP) is a federal program that was created to compensate people who may have been injured by certain vaccines. Visit the VICP website at www.hrsa.gov/vaccinecompensation or call 1-488.783.6884 to learn about the program and about filing a claim. There is a time limit to file a claim for compensation.  7. How can I learn more?  · Ask your  healthcare provider.  · Call your local or state health department.  · Contact the Centers for Disease Control and Prevention (CDC):  ? Call 1-342.104.6296 (2-872-PWB-INFO) or  ? Visit CDC's www.cdc.gov/flu  Vaccine Information Statement (Interim) Inactivated Influenza Vaccine (8/15/2019)  This information is not intended to replace advice given to you by your health care provider. Make sure you discuss any questions you have with your health care provider.  Document Revised: 12/09/2020 Document Reviewed: 12/09/2020  Elsevier Patient Education © 2021 [employer] Inc.

## 2021-10-06 LAB
BASOPHILS # BLD AUTO: 0.05 10*3/MM3 (ref 0–0.2)
BASOPHILS NFR BLD AUTO: 1.1 % (ref 0–1.5)
CHOLEST SERPL-MCNC: 134 MG/DL (ref 0–200)
CHOLEST/HDLC SERPL: 2.44 {RATIO}
EOSINOPHIL # BLD AUTO: 0.18 10*3/MM3 (ref 0–0.4)
EOSINOPHIL NFR BLD AUTO: 4.1 % (ref 0.3–6.2)
ERYTHROCYTE [DISTWIDTH] IN BLOOD BY AUTOMATED COUNT: 13.1 % (ref 12.3–15.4)
FOLATE SERPL-MCNC: >20 NG/ML (ref 4.78–24.2)
HCT VFR BLD AUTO: 39.8 % (ref 37.5–51)
HDLC SERPL-MCNC: 55 MG/DL (ref 40–60)
HGB BLD-MCNC: 13.1 G/DL (ref 13–17.7)
IMM GRANULOCYTES # BLD AUTO: 0 10*3/MM3 (ref 0–0.05)
IMM GRANULOCYTES NFR BLD AUTO: 0 % (ref 0–0.5)
IRON SATN MFR SERPL: 21 % (ref 20–50)
IRON SERPL-MCNC: 98 MCG/DL (ref 59–158)
LDLC SERPL CALC-MCNC: 66 MG/DL (ref 0–100)
LYMPHOCYTES # BLD AUTO: 1.11 10*3/MM3 (ref 0.7–3.1)
LYMPHOCYTES NFR BLD AUTO: 25.5 % (ref 19.6–45.3)
MCH RBC QN AUTO: 31.8 PG (ref 26.6–33)
MCHC RBC AUTO-ENTMCNC: 32.9 G/DL (ref 31.5–35.7)
MCV RBC AUTO: 96.6 FL (ref 79–97)
MONOCYTES # BLD AUTO: 0.48 10*3/MM3 (ref 0.1–0.9)
MONOCYTES NFR BLD AUTO: 11 % (ref 5–12)
NEUTROPHILS # BLD AUTO: 2.53 10*3/MM3 (ref 1.7–7)
NEUTROPHILS NFR BLD AUTO: 58.3 % (ref 42.7–76)
NRBC BLD AUTO-RTO: 0 /100 WBC (ref 0–0.2)
PLATELET # BLD AUTO: 194 10*3/MM3 (ref 140–450)
PSA SERPL-MCNC: 0.31 NG/ML (ref 0–4)
RBC # BLD AUTO: 4.12 10*6/MM3 (ref 4.14–5.8)
T4 FREE SERPL-MCNC: 1.04 NG/DL (ref 0.93–1.7)
TIBC SERPL-MCNC: 459 MCG/DL
TRIGL SERPL-MCNC: 62 MG/DL (ref 0–150)
TSH SERPL DL<=0.005 MIU/L-ACNC: 2.28 UIU/ML (ref 0.27–4.2)
UIBC SERPL-MCNC: 361 MCG/DL (ref 112–346)
VIT B12 SERPL-MCNC: 508 PG/ML (ref 211–946)
VLDLC SERPL CALC-MCNC: 13 MG/DL (ref 5–40)
WBC # BLD AUTO: 4.35 10*3/MM3 (ref 3.4–10.8)

## 2021-10-28 ENCOUNTER — OFFICE VISIT (OUTPATIENT)
Dept: INTERNAL MEDICINE | Facility: CLINIC | Age: 66
End: 2021-10-28

## 2021-10-28 ENCOUNTER — TELEPHONE (OUTPATIENT)
Dept: INTERNAL MEDICINE | Facility: CLINIC | Age: 66
End: 2021-10-28

## 2021-10-28 VITALS
DIASTOLIC BLOOD PRESSURE: 72 MMHG | BODY MASS INDEX: 23.25 KG/M2 | OXYGEN SATURATION: 98 % | RESPIRATION RATE: 16 BRPM | SYSTOLIC BLOOD PRESSURE: 138 MMHG | HEIGHT: 69 IN | TEMPERATURE: 97.1 F | WEIGHT: 157 LBS | HEART RATE: 54 BPM

## 2021-10-28 DIAGNOSIS — Z23 NEED FOR VACCINATION: Primary | ICD-10-CM

## 2021-10-28 DIAGNOSIS — K21.9 GASTROESOPHAGEAL REFLUX DISEASE, UNSPECIFIED WHETHER ESOPHAGITIS PRESENT: Chronic | ICD-10-CM

## 2021-10-28 DIAGNOSIS — D64.9 ANEMIA, UNSPECIFIED TYPE: ICD-10-CM

## 2021-10-28 DIAGNOSIS — I48.0 PAROXYSMAL ATRIAL FIBRILLATION (HCC): ICD-10-CM

## 2021-10-28 DIAGNOSIS — G47.33 OBSTRUCTIVE SLEEP APNEA SYNDROME: Primary | ICD-10-CM

## 2021-10-28 DIAGNOSIS — E78.5 HYPERLIPIDEMIA, UNSPECIFIED HYPERLIPIDEMIA TYPE: Chronic | ICD-10-CM

## 2021-10-28 DIAGNOSIS — I10 PRIMARY HYPERTENSION: Chronic | ICD-10-CM

## 2021-10-28 PROCEDURE — G0009 ADMIN PNEUMOCOCCAL VACCINE: HCPCS | Performed by: INTERNAL MEDICINE

## 2021-10-28 PROCEDURE — 90662 IIV NO PRSV INCREASED AG IM: CPT | Performed by: INTERNAL MEDICINE

## 2021-10-28 PROCEDURE — G0008 ADMIN INFLUENZA VIRUS VAC: HCPCS | Performed by: INTERNAL MEDICINE

## 2021-10-28 PROCEDURE — 99214 OFFICE O/P EST MOD 30 MIN: CPT | Performed by: INTERNAL MEDICINE

## 2021-10-28 PROCEDURE — 90732 PPSV23 VACC 2 YRS+ SUBQ/IM: CPT | Performed by: INTERNAL MEDICINE

## 2021-10-28 RX ORDER — SILDENAFIL 100 MG/1
100 TABLET, FILM COATED ORAL DAILY PRN
COMMUNITY
End: 2022-06-13

## 2021-10-28 NOTE — TELEPHONE ENCOUNTER
Per Dr. Light, patient needs to set up sleep study. Patient has seen Dr. Brian Nolan with Sleep Medicine, is he just needing help scheduling with him again. Or is he needing a new referral to a new sleep medicine?    LM to return call.

## 2021-10-28 NOTE — PROGRESS NOTES
Subjective   Brian Barriga is a 66 y.o. male.     Chief Complaint   Patient presents with   • Hypertension   • Hyperlipidemia   • Atrial Fibrillation   • Coronary Artery Disease   • Trigeminal Neuralgia   • Anemia   • Glaucoma         History of MTHFR gene mutation.  History of NSAID which is been stable.    Hypertension  This is a chronic problem. The current episode started more than 1 year ago. The problem is unchanged. The problem is controlled. Associated symptoms include headaches. Pertinent negatives include no chest pain, palpitations, peripheral edema or shortness of breath. There are no associated agents to hypertension. Risk factors for coronary artery disease include dyslipidemia and male gender. Current antihypertension treatment includes beta blockers. The current treatment provides significant improvement. There are no compliance problems.  Hypertensive end-organ damage includes CAD/MI and CVA. There is no history of angina or kidney disease.   Hyperlipidemia  This is a chronic problem. The current episode started more than 1 year ago. The problem is controlled. Recent lipid tests were reviewed and are normal. Factors aggravating his hyperlipidemia include beta blockers. Pertinent negatives include no chest pain or shortness of breath. Current antihyperlipidemic treatment includes statins. The current treatment provides significant improvement of lipids. There are no compliance problems.  Risk factors for coronary artery disease include dyslipidemia, hypertension and male sex.   Atrial Fibrillation  Presents for follow-up visit. Symptoms are negative for chest pain, palpitations and shortness of breath. The symptoms have been stable. Past medical history includes atrial fibrillation, CAD and hyperlipidemia. There are no medication compliance problems.   Coronary Artery Disease  Presents for follow-up visit. Pertinent negatives include no chest pain, chest pressure, chest tightness, leg swelling,  palpitations or shortness of breath. Risk factors include hyperlipidemia. The symptoms have been resolved. Compliance with diet is good. Compliance with exercise is good. Compliance with medications is good.   Trigeminal Neuralgia  This is a chronic problem. Associated symptoms include fatigue and headaches. Pertinent negatives include no chest pain or coughing.   Anemia  Presents for follow-up visit. There has been no palpitations.   Hyperglycemia  This is a chronic problem. The current episode started more than 1 month ago. Associated symptoms include fatigue and headaches. Pertinent negatives include no chest pain or coughing.        The following portions of the patient's history were reviewed and updated as appropriate: allergies, current medications, past social history and problem list.    Outpatient Medications Marked as Taking for the 10/28/21 encounter (Office Visit) with Robert Light MD   Medication Sig Dispense Refill   • ALLERGY SERUM INJECTION Inject  under the skin into the appropriate area as directed 1 (One) Time Per Week.     • aspirin 81 MG EC tablet Take 81 mg by mouth Daily.     • atorvastatin (LIPITOR) 40 MG tablet TAKE 1 TABLET BY MOUTH EVERY MORNING 90 tablet 1   • finasteride (PROSCAR) 5 MG tablet Take 1 tablet by mouth daily.     • folic acid (FOLVITE) 1 MG tablet TAKE 1 TABLET BY MOUTH EVERY DAY 90 tablet 1   • levocetirizine (XYZAL) 5 MG tablet TAKE 1 TABLET BY MOUTH EVERY EVENING 90 tablet 0   • melatonin 5 MG tablet tablet Take 5 mg by mouth At Night As Needed for Sleep.     • montelukast (SINGULAIR) 10 MG tablet TAKE 1 TABLET BY MOUTH DAILY 90 tablet 1   • Multiple Vitamin (MULTI VITAMIN DAILY PO) Take 1 tablet by mouth Daily.     • pantoprazole (PROTONIX) 40 MG EC tablet TAKE 1 TABLET BY MOUTH EVERY MORNING 30 MINUTES BEFORE BREAKFAST 90 tablet 3   • sildenafil (VIAGRA) 100 MG tablet Take 100 mg by mouth Daily As Needed for Erectile Dysfunction.     • vitamin D (ERGOCALCIFEROL)  1.25 MG (09038 UT) capsule capsule TAKE 1 CAPSULE BY MOUTH ONCE EVERY WEEK 13 capsule 3   • [DISCONTINUED] Netarsudil-Latanoprost (Rocklatan) 0.02-0.005 % solution Apply  to eye(s) as directed by provider.     • [DISCONTINUED] sildenafil (REVATIO) 20 MG tablet Take 20 mg by mouth As Needed.         Review of Systems   Constitutional: Positive for fatigue.   Respiratory: Negative for cough, chest tightness and shortness of breath.    Cardiovascular: Negative for chest pain, palpitations and leg swelling.   Gastrointestinal: Negative for constipation and diarrhea.   Neurological: Positive for headaches.       Objective   Vitals:    10/28/21 0843   BP:    Pulse: 54   Resp:    Temp:    SpO2:           10/28/21  0816   Weight: 71.2 kg (157 lb)    [unfilled]  Body mass index is 23.17 kg/m².      Physical Exam   Constitutional: He appears well-developed.   Neck: No thyromegaly present.   Cardiovascular: Normal rate and normal heart sounds. An irregular rhythm present. Exam reveals no gallop.   No murmur heard.  Pulmonary/Chest: Effort normal and breath sounds normal. No respiratory distress. He has no wheezes. He has no rales.   Abdominal: Soft. Normal appearance and bowel sounds are normal. He exhibits no mass. There is no abdominal tenderness. There is no guarding.   Neurological: He is alert.         Problems Addressed this Visit        Cardiac and Vasculature    Hypertension (Chronic)    Hyperlipidemia (Chronic)    Paroxysmal atrial fibrillation (HCC)    Relevant Medications    sildenafil (VIAGRA) 100 MG tablet       Gastrointestinal Abdominal     Gastroesophageal reflux disease (Chronic)      Other Visit Diagnoses     Need for vaccination    -  Primary    Relevant Orders    Pneumococcal Polysaccharide Vaccine 23-Valent (PPSV23) Greater Than or Equal To 3yo Subcutaneous / IM (Completed)    Fluzone High-Dose 65+yrs (0291-9278) (Completed)      Diagnoses       Codes Comments    Need for vaccination    -  Primary  ICD-10-CM: Z23  ICD-9-CM: V05.9     Primary hypertension     ICD-10-CM: I10  ICD-9-CM: 401.9     Hyperlipidemia, unspecified hyperlipidemia type     ICD-10-CM: E78.5  ICD-9-CM: 272.4     Paroxysmal atrial fibrillation (HCC)     ICD-10-CM: I48.0  ICD-9-CM: 427.31     Gastroesophageal reflux disease, unspecified whether esophagitis present     ICD-10-CM: K21.9  ICD-9-CM: 530.81         Assessment/Plan   In for recheck of multiple medical problems.  Hypertension, hyperlipidemia, CAD, PAF.  History of stroke and hypercoagulable state.  SAD.  Heterozygous MTHFR gene mutation.  Blood pressure control is excellent.  Lipids are excelllent.  No arrhythmias.   Had annual lab work December 2020.  Gets lipids every 3 months.  We will move out to 6 months on that.  Lipids have been very stable.  Urologist checks his PSA and prostate exam.  Has no trouble with spin class and exercise.  Fatigue is still present and the seem to start around February 2021.  Prior to that his fatigue was on and off.  Seems worse now.  He is actually had fatigue for years.  This is documented in the records.  Previous 24-hour Holter was unrevealing.  That was 2018.  I explained to patient I have no good explanation for his fatigue other than perhaps a mood disorder.  We could consider a fasting testosterone level given his chronic mild anemia.  He has Medicare but is a federal plan so his preventive exam should be covered in the future.  SAD is stable.  History of MTHFR gene mutation.  Annual wellness visit November 2020.  Lots of allergies.  Some new ones.  It is very TGN is improving over time but he cannot quantify it.  He has had recent glaucoma surgery which confuses the issue.  His glaucoma is not felt at all to be related to his TGN.  He is fasting today.    PPE today includes face mask and eye shield.         Dragon disclaimer:   Much of this encounter note is an electronic transcription/translation of spoken language to printed text. The  electronic translation of spoken language may permit erroneous, or at times, nonsensical words or phrases to be inadvertently transcribed; Although I have reviewed the note for such errors, some may still exist.

## 2021-10-28 NOTE — PATIENT INSTRUCTIONS
Influenza (Flu) Vaccine (Inactivated or Recombinant): What You Need to Know  1. Why get vaccinated?  Influenza vaccine can prevent influenza (flu).  Flu is a contagious disease that spreads around the [place] every year, usually between October and May. Anyone can get the flu, but it is more dangerous for some people. Infants and young children, people 65 years of age and older, pregnant women, and people with certain health conditions or a weakened immune system are at greatest risk of flu complications.  Pneumonia, bronchitis, sinus infections and ear infections are examples of flu-related complications. If you have a medical condition, such as heart disease, cancer or diabetes, flu can make it worse.  Flu can cause fever and chills, sore throat, muscle aches, fatigue, cough, headache, and runny or stuffy nose. Some people may have vomiting and diarrhea, though this is more common in children than adults.  Each year thousands of people in the [place] die from flu, and many more are hospitalized. Flu vaccine prevents millions of illnesses and flu-related visits to the doctor each year.  2. Influenza vaccine  CDC recommends everyone 6 months of age and older get vaccinated every flu season. Children 6 months through 8 years of age may need 2 doses during a single flu season. Everyone else needs only 1 dose each flu season.  It takes about 2 weeks for protection to develop after vaccination.  There are many flu viruses, and they are always changing. Each year a new flu vaccine is made to protect against three or four viruses that are likely to cause disease in the upcoming flu season. Even when the vaccine doesn't exactly match these viruses, it may still provide some protection.  Influenza vaccine does not cause flu.  Influenza vaccine may be given at the same time as other vaccines.  3. Talk with your health care provider  Tell your vaccine provider if the person getting the vaccine:  · Has had an  allergic reaction after a previous dose of influenza vaccine, or has any severe, life-threatening allergies.  · Has ever had Guillain-Barré Syndrome (also called GBS).  In some cases, your health care provider may decide to postpone influenza vaccination to a future visit.  People with minor illnesses, such as a cold, may be vaccinated. People who are moderately or severely ill should usually wait until they recover before getting influenza vaccine.  Your health care provider can give you more information.  4. Risks of a vaccine reaction  · Soreness, redness, and swelling where shot is given, fever, muscle aches, and headache can happen after influenza vaccine.  · There may be a very small increased risk of Guillain-Barré Syndrome (GBS) after inactivated influenza vaccine (the flu shot).  Young children who get the flu shot along with pneumococcal vaccine (PCV13), and/or DTaP vaccine at the same time might be slightly more likely to have a seizure caused by fever. Tell your health care provider if a child who is getting flu vaccine has ever had a seizure.  People sometimes faint after medical procedures, including vaccination. Tell your provider if you feel dizzy or have vision changes or ringing in the ears.  As with any medicine, there is a very remote chance of a vaccine causing a severe allergic reaction, other serious injury, or death.  5. What if there is a serious problem?  An allergic reaction could occur after the vaccinated person leaves the clinic. If you see signs of a severe allergic reaction (hives, swelling of the face and throat, difficulty breathing, a fast heartbeat, dizziness, or weakness), call 9-1-1 and get the person to the nearest hospital.  For other signs that concern you, call your health care provider.  Adverse reactions should be reported to the Vaccine Adverse Event Reporting System (VAERS). Your health care provider will usually file this report, or you can do it yourself. Visit the  VAERS website at www.vaers.hhs.gov or call 1-747.854.6246. VAERS is only for reporting reactions, and VAERS staff do not give medical advice.  6. The National Vaccine Injury Compensation Program  The National Vaccine Injury Compensation Program (VICP) is a federal program that was created to compensate people who may have been injured by certain vaccines. Visit the VICP website at www.[facility].gov/vaccinecompensation or call 1-179.846.8971 to learn about the program and about filing a claim. There is a time limit to file a claim for compensation.  7. How can I learn more?  · Ask your healthcare provider.  · Call your local or state health department.  · Contact the Centers for Disease Control and Prevention (CDC):  ? Call 1-462.424.5649 (2-247-JHS-INFO) or  ? Visit CDC's www.cdc.gov/flu  Vaccine Information Statement (Interim) Inactivated Influenza Vaccine (8/15/2019)  This information is not intended to replace advice given to you by your health care provider. Make sure you discuss any questions you have with your health care provider.  Document Revised: 12/09/2020 Document Reviewed: 12/09/2020  [employer] Patient Education © 2021 [employer]  Pneumococcal Polysaccharide Vaccine (PPSV23): What You Need to Know  1. Why get vaccinated?  Pneumococcal polysaccharide vaccine (PPSV23) can prevent pneumococcal disease.  Pneumococcal disease refers to any illness caused by pneumococcal bacteria. These bacteria can cause many types of illnesses, including pneumonia, which is an infection of the lungs. Pneumococcal bacteria are one of the most common causes of pneumonia.  Besides pneumonia, pneumococcal bacteria can also cause:  · Ear infections  · Sinus infections  · Meningitis (infection of the tissue covering the brain and spinal cord)  · Bacteremia (bloodstream infection)  Anyone can get pneumococcal disease, but children under 2 years of age, people with certain medical conditions, adults 65 years or older, and cigarette  smokers are at the highest risk.  Most pneumococcal infections are mild. However, some can result in long-term problems, such as brain damage or hearing loss. Meningitis, bacteremia, and pneumonia caused by pneumococcal disease can be fatal.  2. PPSV23  PPSV23 protects against 23 types of bacteria that cause pneumococcal disease.  PPSV23 is recommended for:  · All adults 65 years or older,  · Anyone 2 years or older with certain medical conditions that can lead to an increased risk for pneumococcal disease.  Most people need only one dose of PPSV23. A second dose of PPSV23, and another type of pneumococcal vaccine called PCV13, are recommended for certain high-risk groups. Your health care provider can give you more information.  People 65 years or older should get a dose of PPSV23 even if they have already gotten one or more doses of the vaccine before they turned 65.  3. Talk with your health care provider  Tell your vaccine provider if the person getting the vaccine:  · Has had an allergic reaction after a previous dose of PPSV23, or has any severe, life-threatening allergies.  In some cases, your health care provider may decide to postpone PPSV23 vaccination to a future visit.  People with minor illnesses, such as a cold, may be vaccinated. People who are moderately or severely ill should usually wait until they recover before getting PPSV23.  Your health care provider can give you more information.  4. Risks of a vaccine reaction  · Redness or pain where the shot is given, feeling tired, fever, or muscle aches can happen after PPSV23.  People sometimes faint after medical procedures, including vaccination. Tell your provider if you feel dizzy or have vision changes or ringing in the ears.  As with any medicine, there is a very remote chance of a vaccine causing a severe allergic reaction, other serious injury, or death.  5. What if there is a serious problem?  An allergic reaction could occur after the  vaccinated person leaves the clinic. If you see signs of a severe allergic reaction (hives, swelling of the face and throat, difficulty breathing, a fast heartbeat, dizziness, or weakness), call 9-1-1 and get the person to the nearest hospital.  For other signs that concern you, call your health care provider.  Adverse reactions should be reported to the Vaccine Adverse Event Reporting System (VAERS). Your health care provider will usually file this report, or you can do it yourself. Visit the VAERS website at www.[facility]ers[facility].gov or call 1-736.836.9389. VAERS is only for reporting reactions, and VAERS staff do not give medical advice.  6. How can I learn more?  · Ask your health care provider.  · Call your local or state health department.  · Contact the Centers for Disease Control and Prevention (CDC):  ? Call 1-140.575.3355 (3-069-CLL-INFO) or  ? Visit CDC's website at www.cdc.gov/vaccines  Vaccine Information Statement PPSV23 Vaccine (10/30/2019)  This information is not intended to replace advice given to you by your health care provider. Make sure you discuss any questions you have with your health care provider.  Document Revised: 12/07/2020 Document Reviewed: 12/07/2020  [employer] Patient Education © 2021 [employer]  Recombinant Zoster (Shingles) Vaccine: What You Need to Know  1. Why get vaccinated?  Recombinant zoster (shingles) vaccine can prevent shingles.  Shingles (also called herpes zoster, or just zoster) is a painful skin rash, usually with blisters. In addition to the rash, shingles can cause fever, headache, chills, or upset stomach. More rarely, shingles can lead to pneumonia, hearing problems, blindness, brain inflammation (encephalitis), or death.  The most common complication of shingles is long-term nerve pain called postherpetic neuralgia (PHN). PHN occurs in the areas where the shingles rash was, even after the rash clears up. It can last for months or years after the rash goes away. The  pain from PHN can be severe and debilitating.  About 10 to 18% of people who get shingles will experience PHN. The risk of PHN increases with age. An older adult with shingles is more likely to develop PHN and have longer lasting and more severe pain than a younger person with shingles.  Shingles is caused by the varicella zoster virus, the same virus that causes chickenpox. After you have chickenpox, the virus stays in your body and can cause shingles later in life. Shingles cannot be passed from one person to another, but the virus that causes shingles can spread and cause chickenpox in someone who had never had chickenpox or received chickenpox vaccine.  2. Recombinant shingles vaccine  Recombinant shingles vaccine provides strong protection against shingles. By preventing shingles, recombinant shingles vaccine also protects against PHN.  Recombinant shingles vaccine is the preferred vaccine for the prevention of shingles. However, a different vaccine, live shingles vaccine, may be used in some circumstances.  The recombinant shingles vaccine is recommended for adults 50 years and older without serious immune problems. It is given as a two-dose series.  This vaccine is also recommended for people who have already gotten another type of shingles vaccine, the live shingles vaccine. There is no live virus in this vaccine.  Shingles vaccine may be given at the same time as other vaccines.  3. Talk with your health care provider  Tell your vaccine provider if the person getting the vaccine:  · Has had an allergic reaction after a previous dose of recombinant shingles vaccine, or has any severe, life-threatening allergies.  · Is pregnant or breastfeeding.  · Is currently experiencing an episode of shingles.  In some cases, your health care provider may decide to postpone shingles vaccination to a future visit.  People with minor illnesses, such as a cold, may be vaccinated. People who are moderately or severely ill  should usually wait until they recover before getting recombinant shingles vaccine.  Your health care provider can give you more information.  4. Risks of a vaccine reaction  · A sore arm with mild or moderate pain is very common after recombinant shingles vaccine, affecting about 80% of vaccinated people. Redness and swelling can also happen at the site of the injection.  · Tiredness, muscle pain, headache, shivering, fever, stomach pain, and nausea happen after vaccination in more than half of people who receive recombinant shingles vaccine.  In clinical trials, about 1 out of 6 people who got recombinant zoster vaccine experienced side effects that prevented them from doing regular activities. Symptoms usually went away on their own in 2 to 3 days.  You should still get the second dose of recombinant zoster vaccine even if you had one of these reactions after the first dose.  People sometimes faint after medical procedures, including vaccination. Tell your provider if you feel dizzy or have vision changes or ringing in the ears.  As with any medicine, there is a very remote chance of a vaccine causing a severe allergic reaction, other serious injury, or death.  5. What if there is a serious problem?  An allergic reaction could occur after the vaccinated person leaves the clinic. If you see signs of a severe allergic reaction (hives, swelling of the face and throat, difficulty breathing, a fast heartbeat, dizziness, or weakness), call 9-1-1 and get the person to the nearest hospital.  For other signs that concern you, call your health care provider.  Adverse reactions should be reported to the Vaccine Adverse Event Reporting System (VAERS). Your health care provider will usually file this report, or you can do it yourself. Visit the VAERS website at www.vaers.hhs.gov or call 1-107.922.8994. VAERS is only for reporting reactions, and VAERS staff do not give medical advice.  6. How can I learn more?  · Ask your  health care provider.  · Call your local or state health department.  · Contact the Centers for Disease Control and Prevention (CDC):  ? Call 1-476.353.4195 (8-471-IHI-INFO) or  ? Visit CDC's website at www.cdc.gov/vaccines  Vaccine Information Statement Recombinant Zoster Vaccine (10/30/2019)  This information is not intended to replace advice given to you by your health care provider. Make sure you discuss any questions you have with your health care provider.  Document Revised: 12/09/2020 Document Reviewed: 12/09/2020  [employer] Patient Education © 2021 [employer].

## 2021-10-28 NOTE — TELEPHONE ENCOUNTER
Patient was not happy with whoever he saw last, presumably Dr. Nolan.  Lets set up with the neurologist who does sleep studies.

## 2021-11-01 RX ORDER — ATORVASTATIN CALCIUM 40 MG/1
TABLET, FILM COATED ORAL
Qty: 90 TABLET | Refills: 1 | Status: SHIPPED | OUTPATIENT
Start: 2021-11-01 | End: 2022-04-19

## 2021-11-02 ENCOUNTER — TELEPHONE (OUTPATIENT)
Dept: NEUROLOGY | Facility: CLINIC | Age: 66
End: 2021-11-02

## 2021-11-02 NOTE — TELEPHONE ENCOUNTER
"  Caller: MO    Relationship to Patient: SELF    Phone Number:[phone]    Reason for Call:     PT HAS A REFERRAL FOR ANNMARIE. WAS GOING TO SCHED WITH ANDREW @ [facility] BUT I SEE ALSO CHRISTIANO SEES FOR SLEEP. HOWEVER WHEN I TRY TO CHOOSE THE \"NEW PATIENT - SLEEP\" APPT TYPE FOR HIM, THERE ARE NO AVAILABLE APPTS.    1) IS CHRISTIANO CURRENTLY SEEING PTS FOR SLEEP?    2) IF YES, SHOULD I SCHED AS REGULAR \"NEW PATIENT\" APPT TYPE?    PLEASE ADVISE AND I CAN CALL PT BACK TO SCHEDULE        "

## 2021-11-03 LAB
TESTOST FREE SERPL-MCNC: 5 PG/ML (ref 6.6–18.1)
TESTOST SERPL-MCNC: 480.1 NG/DL (ref 264–916)

## 2021-11-04 ENCOUNTER — TELEPHONE (OUTPATIENT)
Dept: INTERNAL MEDICINE | Facility: CLINIC | Age: 66
End: 2021-11-04

## 2021-11-04 DIAGNOSIS — D64.9 ANEMIA, UNSPECIFIED TYPE: Primary | ICD-10-CM

## 2021-11-04 RX ORDER — ERGOCALCIFEROL 1.25 MG/1
CAPSULE ORAL
Qty: 13 CAPSULE | Refills: 3 | Status: SHIPPED | OUTPATIENT
Start: 2021-11-04 | End: 2022-12-30

## 2021-11-04 NOTE — TELEPHONE ENCOUNTER
Caller: Brian Barriga    Relationship: Self    Best call back number: [phone] (M)    What is the best time to reach you: ANYTIME     Who are you requesting to speak with (clinical staff, provider,  specific staff member): CLINICAL     Do you know the name of the person who called: KARL CIFUENTES     What was the call regarding: TESTING     Do you require a callback: YES

## 2021-11-04 NOTE — TELEPHONE ENCOUNTER
Called spoke with patient, informed repeat fasting 8am testosterone, verbally understood.  Patient stated going out of town until Monday 11/8 will get drawn that week of return.

## 2021-11-11 LAB
TESTOST FREE MFR SERPL: 0.8 %
TESTOST FREE SERPL-MCNC: 43 PG/ML
TESTOST SERPL-MCNC: 539 NG/DL

## 2021-11-15 DIAGNOSIS — R79.89 DECREASED FREE TESTOSTERONE LEVEL IN MALE: Primary | ICD-10-CM

## 2021-11-17 ENCOUNTER — TELEPHONE (OUTPATIENT)
Dept: INTERNAL MEDICINE | Facility: CLINIC | Age: 66
End: 2021-11-17

## 2021-11-17 ENCOUNTER — TELEPHONE (OUTPATIENT)
Dept: NEUROLOGY | Facility: CLINIC | Age: 66
End: 2021-11-17

## 2021-11-17 NOTE — TELEPHONE ENCOUNTER
It sounds like I should take a look at him.  I did not treat him initially for this.  See if I could see him tomorrow.

## 2021-11-17 NOTE — TELEPHONE ENCOUNTER
Spoke with patient and informed him that Dr Baltazar recommends that he follow up with Dr Freedman- he voiced understanding

## 2021-11-17 NOTE — TELEPHONE ENCOUNTER
"Dr. Freedman's note states to follow up in the \"[facility]\" office.  Does that mean that the patient sees him somewhere outside of [place]?    "

## 2021-11-17 NOTE — TELEPHONE ENCOUNTER
Called spoke with patient, completed azithromycin 250 mg and prednisone pack 4 mg #21. Does patient need video visit or what recommendations.

## 2021-11-17 NOTE — TELEPHONE ENCOUNTER
If he already has established care with Dr. Freedman, then it would likely be best for his care to remain in that practice.

## 2021-11-17 NOTE — TELEPHONE ENCOUNTER
PATIENT WENT TO [facility] ON 11/11/21 FOR RESPITORY  ISSUES, WAS GIVEN AN ANTIBIOTIC AND STEROIDS THAT HE COMPLETED YESTERDAY. HE FEELS LIKE IT IS COMING BACK AND WANTED TO KNOW IF HE SHOULD COME BACK IN OR CAN YOU CALL IN AN ANTIBIOTIC. TESTED NEGATIVE FOR COVID.    PLEASE ADVISE  [phone]       [id number] - [place], [place] - [address] AT [facility] [address]([place] - [phone] [facility] [phone]   [phone]

## 2021-11-17 NOTE — TELEPHONE ENCOUNTER
He is being referred by his PCP for sleep apnea-saw Dr Nolan in 2018 and did not wish to return.    He has recently seen a APRN in Dr Freedman office vand has had 3 sleep studies with Dr Freedman, the most recent was in Sept    He states that the first study was inconclusive, the 2nd one said he had sleep apnea and the 3rd said he does not.  He is confused about the discrepancy.    He wears an oral appliance that is old, but not a CPAP     Can you see or does he need to go back to Dr Freedman ?

## 2021-11-18 ENCOUNTER — TELEMEDICINE (OUTPATIENT)
Dept: INTERNAL MEDICINE | Facility: CLINIC | Age: 66
End: 2021-11-18

## 2021-11-18 DIAGNOSIS — J40 BRONCHITIS: Primary | ICD-10-CM

## 2021-11-18 PROCEDURE — 99213 OFFICE O/P EST LOW 20 MIN: CPT | Performed by: INTERNAL MEDICINE

## 2021-11-18 RX ORDER — BENZONATATE 200 MG/1
200 CAPSULE ORAL 3 TIMES DAILY PRN
Qty: 30 CAPSULE | Refills: 0 | Status: SHIPPED | OUTPATIENT
Start: 2021-11-18 | End: 2021-12-13

## 2021-11-18 RX ORDER — AMOXICILLIN AND CLAVULANATE POTASSIUM 875; 125 MG/1; MG/1
1 TABLET, FILM COATED ORAL EVERY 12 HOURS SCHEDULED
Qty: 14 TABLET | Refills: 0 | Status: SHIPPED | OUTPATIENT
Start: 2021-11-18 | End: 2021-12-13

## 2021-11-18 RX ORDER — METHYLPREDNISOLONE 4 MG/1
TABLET ORAL
COMMUNITY
Start: 2021-11-11 | End: 2021-11-18

## 2021-11-18 NOTE — PROGRESS NOTES
{SnapShot  Notes  Encounters  Result Review  Labs  Imaging  [facility]  Media  Care Everywhere :33}       Subjective   Brain Barriga is a 66 y.o. male.     Chief Complaint   Patient presents with   • Cough   • Nasal Congestion   • Earache         Cough  This is a new problem. The current episode started 1 to 4 weeks ago. The problem has been gradually worsening. The cough is non-productive. Associated symptoms include chills and ear congestion. Pertinent negatives include no ear pain, fever, sore throat or shortness of breath. The treatment provided moderate relief.        The following portions of the patient's history were reviewed and updated as appropriate: allergies, current medications, past social history and problem list.    Outpatient Medications Marked as Taking for the 11/18/21 encounter (Telemedicine) with Robert Light MD   Medication Sig Dispense Refill   • ALLERGY SERUM INJECTION Inject  under the skin into the appropriate area as directed 1 (One) Time Per Week.     • aspirin 81 MG EC tablet Take 81 mg by mouth Daily.     • atorvastatin (LIPITOR) 40 MG tablet TAKE 1 TABLET BY MOUTH EVERY MORNING 90 tablet 1   • finasteride (PROSCAR) 5 MG tablet Take 1 tablet by mouth daily.     • folic acid (FOLVITE) 1 MG tablet TAKE 1 TABLET BY MOUTH EVERY DAY 90 tablet 1   • levocetirizine (XYZAL) 5 MG tablet TAKE 1 TABLET BY MOUTH EVERY EVENING 90 tablet 0   • melatonin 5 MG tablet tablet Take 5 mg by mouth At Night As Needed for Sleep.     • montelukast (SINGULAIR) 10 MG tablet TAKE 1 TABLET BY MOUTH DAILY 90 tablet 1   • Multiple Vitamin (MULTI VITAMIN DAILY PO) Take 1 tablet by mouth Daily.     • pantoprazole (PROTONIX) 40 MG EC tablet TAKE 1 TABLET BY MOUTH EVERY MORNING 30 MINUTES BEFORE BREAKFAST 90 tablet 3   • sildenafil (VIAGRA) 100 MG tablet Take 100 mg by mouth Daily As Needed for Erectile Dysfunction.     • vitamin D (ERGOCALCIFEROL) 1.25 MG (82684 UT) capsule capsule TAKE 1 CAPSULE BY MOUTH ONCE  EVERY WEEK 13 capsule 3   • [DISCONTINUED] methylPREDNISolone (MEDROL) 4 MG dose pack follow package directions         Review of Systems   Constitutional: Positive for chills. Negative for fever.   HENT: Positive for sinus pressure. Negative for ear pain and sore throat.    Respiratory: Positive for cough. Negative for shortness of breath.        Objective   There were no vitals filed for this visit.   Wt Readings from Last 3 Encounters:   10/28/21 71.2 kg (157 lb)   10/05/21 69.9 kg (154 lb)   04/28/21 72.1 kg (159 lb)    There is no height or weight on file to calculate BMI.      Physical Exam  Constitutional:       Appearance: Normal appearance.   Pulmonary:      Effort: Pulmonary effort is normal.   Neurological:      Mental Status: He is alert.   Psychiatric:         Mood and Affect: Mood normal.         Behavior: Behavior normal.         Thought Content: Thought content normal.         Judgment: Judgment normal.           Problems Addressed this Visit     None      Visit Diagnoses     Bronchitis    -  Primary      Diagnoses       Codes Comments    Bronchitis    -  Primary ICD-10-CM: J40  ICD-9-CM: 490         Assessment/Plan   Video visit due to Covid pandemic . about 13 days ago he took a hike.  A few days later he started having cough with sputum production.  Dark-colored sputum.  Some head congestion.  Fullness in his ears.  Chills without fever.  Was seen at immediate care center and had a flu screen and Covid screen were negative.  Given a Z-Willie and a Medrol Dosepak.  He is also taking antihistamines and Tessalon cough Perles.  He felt quite a bit better for 3 or 4 days in the past 3 days of so he seems like he is getting worse again.  We will try switching to Augmentin 875 twice daily for 1 week.  Continue with all the rest of his medications.  He does take allergy shots once weekly.  Spent 10 minutes on the video visit today.  Shareable Social platform used.  Reviewed records from urgent care center.      The  above information was reviewed again today 11/18/21.  It continues to be accurate as reflected above and is unchanged.  History, physical and review of systems all reviewed and are unchanged.  Medications were reviewed today and continue the current dosing.              Tricia disclaimer:   Much of this encounter note is an electronic transcription/translation of spoken language to printed text. The electronic translation of spoken language may permit erroneous, or at times, nonsensical words or phrases to be inadvertently transcribed; Although I have reviewed the note for such errors, some may still exist.

## 2021-12-13 ENCOUNTER — OFFICE VISIT (OUTPATIENT)
Dept: INTERNAL MEDICINE | Facility: CLINIC | Age: 66
End: 2021-12-13

## 2021-12-13 VITALS
DIASTOLIC BLOOD PRESSURE: 90 MMHG | HEIGHT: 69 IN | SYSTOLIC BLOOD PRESSURE: 145 MMHG | RESPIRATION RATE: 16 BRPM | OXYGEN SATURATION: 98 % | HEART RATE: 88 BPM | WEIGHT: 161 LBS | TEMPERATURE: 97.7 F | BODY MASS INDEX: 23.85 KG/M2

## 2021-12-13 DIAGNOSIS — J01.00 SUBACUTE MAXILLARY SINUSITIS: Primary | ICD-10-CM

## 2021-12-13 PROCEDURE — 99213 OFFICE O/P EST LOW 20 MIN: CPT | Performed by: INTERNAL MEDICINE

## 2021-12-13 RX ORDER — PREDNISONE 20 MG/1
20 TABLET ORAL DAILY
COMMUNITY
End: 2022-01-25

## 2021-12-13 NOTE — PROGRESS NOTES
Subjective   Brian Barriga is a 66 y.o. male.     Chief Complaint   Patient presents with   • Cough     Productive    • URI     Patient states he has had these symptoms for about 5 weeks   • Sore Throat   • Chills   • Earache   • Covid/Flu         Cough  This is a new problem. The current episode started 1 to 4 weeks ago. The problem has been gradually worsening. The cough is non-productive. Associated symptoms include chills, ear congestion and a sore throat. Pertinent negatives include no fever, headaches, shortness of breath or wheezing. The treatment provided moderate relief.        The following portions of the patient's history were reviewed and updated as appropriate: allergies, current medications, past social history and problem list.    Outpatient Medications Marked as Taking for the 12/13/21 encounter (Office Visit) with Robert Light MD   Medication Sig Dispense Refill   • ALLERGY SERUM INJECTION Inject  under the skin into the appropriate area as directed 1 (One) Time Per Week.     • aspirin 81 MG EC tablet Take 81 mg by mouth Daily.     • atorvastatin (LIPITOR) 40 MG tablet TAKE 1 TABLET BY MOUTH EVERY MORNING 90 tablet 1   • benzonatate (TESSALON) 200 MG capsule Take 1 capsule by mouth 3 (Three) Times a Day As Needed for Cough. 30 capsule 0   • finasteride (PROSCAR) 5 MG tablet Take 1 tablet by mouth daily.     • folic acid (FOLVITE) 1 MG tablet TAKE 1 TABLET BY MOUTH EVERY DAY 90 tablet 1   • levocetirizine (XYZAL) 5 MG tablet TAKE 1 TABLET BY MOUTH EVERY EVENING 90 tablet 0   • melatonin 5 MG tablet tablet Take 5 mg by mouth At Night As Needed for Sleep.     • montelukast (SINGULAIR) 10 MG tablet TAKE 1 TABLET BY MOUTH DAILY 90 tablet 1   • Multiple Vitamin (MULTI VITAMIN DAILY PO) Take 1 tablet by mouth Daily.     • pantoprazole (PROTONIX) 40 MG EC tablet TAKE 1 TABLET BY MOUTH EVERY MORNING 30 MINUTES BEFORE BREAKFAST 90 tablet 3   • predniSONE (DELTASONE) 20 MG tablet Take 20 mg by  mouth Daily.     • sildenafil (VIAGRA) 100 MG tablet Take 100 mg by mouth Daily As Needed for Erectile Dysfunction.     • vitamin D (ERGOCALCIFEROL) 1.25 MG (72545 UT) capsule capsule TAKE 1 CAPSULE BY MOUTH ONCE EVERY WEEK 13 capsule 3       Review of Systems   Constitutional: Positive for chills. Negative for fever.   HENT: Positive for sinus pressure, sinus pain and sore throat.    Respiratory: Positive for cough. Negative for shortness of breath and wheezing.    Neurological: Negative for headaches.       Objective   Vitals:    12/13/21 1433   BP: 145/90   Pulse: 88   Resp: 16   Temp: 97.7 °F (36.5 °C)   SpO2: 98%      Wt Readings from Last 3 Encounters:   12/13/21 73 kg (161 lb)   10/28/21 71.2 kg (157 lb)   10/05/21 69.9 kg (154 lb)    Body mass index is 23.78 kg/m².      Physical Exam  Constitutional:       Appearance: Normal appearance.   HENT:      Right Ear: Tympanic membrane normal.      Left Ear: Tympanic membrane normal.      Nose: Nose normal.      Mouth/Throat:      Mouth: Mucous membranes are dry.      Pharynx: No posterior oropharyngeal erythema.   Pulmonary:      Effort: Pulmonary effort is normal.      Breath sounds: Normal breath sounds.   Neurological:      Mental Status: He is alert.           Problems Addressed this Visit     None      Visit Diagnoses     Subacute maxillary sinusitis    -  Primary      Diagnoses       Codes Comments    Subacute maxillary sinusitis    -  Primary ICD-10-CM: J01.00  ICD-9-CM: 461.0         Assessment/Plan   Began about 39 days ago he took a hike.  A few days later he started having cough with sputum production.  Dark-colored sputum.  Some head congestion.  Fullness in his ears.  Chills without fever.  Was seen at [facility] and had a flu screen and Covid screen were negative.  Given a Amoxil and a Medrol Dosepak.  He is also taking antihistamines and Tessalon cough Perles.  He felt quite a bit better for 3 or 4 days in the past 3 days of so he seems  like he is getting worse again.  We then switched to Augmentin 875 twice daily for 1 week on 11/17/2021.  Continued with all the rest of his medications.  He does take allergy shots once weekly.  He has continued to have a lot of sinus pressure and sore throat since then.  Only a partial response to the Augmentin.  Currently is on Tessalon cough Perles along Xyzol and Mucinex twice daily.  Sudafed.  Tylenol.  Fluticasone and Afrin.  He has been on prednisone 10 mg daily which she has been tapering down.  I initially gave him some prednisone on a taper and Dr. Reis redosed it.  He was getting better at that point but then flared again after his visit with Dr. Reis.  Has also seen Dr. Hurtado ENT in the past.  Needs to see Dr. Reis or Bryant to evaluate sinuses better.  Will likely need endoscopy.  May be a candidate for immunoglobulins to check for immunodeficiency and possible IVIG.    The above information was reviewed again today 12/13/21.  It continues to be accurate as reflected above and is unchanged.  History, physical and review of systems all reviewed and are unchanged.  Medications were reviewed today and continue the current dosing.              Dragon disclaimer:   Much of this encounter note is an electronic transcription/translation of spoken language to printed text. The electronic translation of spoken language may permit erroneous, or at times, nonsensical words or phrases to be inadvertently transcribed; Although I have reviewed the note for such errors, some may still exist.

## 2022-01-19 DIAGNOSIS — E78.5 HYPERLIPIDEMIA: ICD-10-CM

## 2022-01-19 RX ORDER — FOLIC ACID 1 MG/1
TABLET ORAL
Qty: 90 TABLET | Refills: 1 | Status: SHIPPED | OUTPATIENT
Start: 2022-01-19 | End: 2022-07-18

## 2022-01-25 ENCOUNTER — OFFICE VISIT (OUTPATIENT)
Dept: ENDOCRINOLOGY | Age: 67
End: 2022-01-25

## 2022-01-25 VITALS
HEIGHT: 69 IN | SYSTOLIC BLOOD PRESSURE: 138 MMHG | OXYGEN SATURATION: 98 % | HEART RATE: 74 BPM | DIASTOLIC BLOOD PRESSURE: 78 MMHG | BODY MASS INDEX: 23.82 KG/M2 | WEIGHT: 160.8 LBS

## 2022-01-25 DIAGNOSIS — I25.10 CORONARY ARTERY DISEASE INVOLVING NATIVE CORONARY ARTERY OF NATIVE HEART WITHOUT ANGINA PECTORIS: ICD-10-CM

## 2022-01-25 DIAGNOSIS — I10 PRIMARY HYPERTENSION: Chronic | ICD-10-CM

## 2022-01-25 DIAGNOSIS — R53.82 CHRONIC FATIGUE: Primary | ICD-10-CM

## 2022-01-25 DIAGNOSIS — E78.5 HYPERLIPIDEMIA, UNSPECIFIED HYPERLIPIDEMIA TYPE: Chronic | ICD-10-CM

## 2022-01-25 DIAGNOSIS — E55.9 VITAMIN D DEFICIENCY: Chronic | ICD-10-CM

## 2022-01-25 DIAGNOSIS — G47.33 OBSTRUCTIVE SLEEP APNEA SYNDROME: Chronic | ICD-10-CM

## 2022-01-25 DIAGNOSIS — Z15.89 MTHFR GENE MUTATION: ICD-10-CM

## 2022-01-25 PROCEDURE — 99205 OFFICE O/P NEW HI 60 MIN: CPT | Performed by: INTERNAL MEDICINE

## 2022-01-25 RX ORDER — OXCARBAZEPINE 150 MG/1
300 TABLET, FILM COATED ORAL 2 TIMES DAILY
COMMUNITY
End: 2022-06-13 | Stop reason: SDUPTHER

## 2022-01-25 RX ORDER — VALACYCLOVIR HYDROCHLORIDE 1 G/1
1000 TABLET, FILM COATED ORAL
COMMUNITY

## 2022-01-25 RX ORDER — DORZOLAMIDE HYDROCHLORIDE AND TIMOLOL MALEATE 20; 5 MG/ML; MG/ML
SOLUTION/ DROPS OPHTHALMIC
COMMUNITY
Start: 2022-01-20

## 2022-01-25 RX ORDER — KETOROLAC TROMETHAMINE 5 MG/ML
1 SOLUTION OPHTHALMIC 2 TIMES DAILY
COMMUNITY
Start: 2021-11-30 | End: 2022-06-13

## 2022-01-25 RX ORDER — PREDNISOLONE ACETATE 10 MG/ML
SUSPENSION/ DROPS OPHTHALMIC
COMMUNITY
Start: 2022-01-24 | End: 2022-03-17

## 2022-01-25 NOTE — PROGRESS NOTES
Subjective   Brian Barriga is a 66 y.o. male.     New pt ref by dr Robert Light for low testosterone / flu vaccine @ PCP       {Common H&P Review Areas:80120}    Review of Systems  Objective   Physical Exam  Orders Only on 11/04/2021   Component Date Value Ref Range Status   • Testosterone, Total 11/05/2021 539  ng/dL Final    Comment: This test was developed and its performance characteristics  determined by [facility]. It has not been cleared or approved  by the Food and Drug Administration.  Reference Range:  Adult Males  >18 years    264 - 916  This [facility] LC/MS-MS method is currently certified by the  CDC Hormone Standardization Program (HoST).  Adult male  reference interval is based on a population of healthy  nonobese males (BMI <30) between 19 and 39 years old.  Julio, et.al. JCEM 2017,102;6001-3801 PMID: [id number].     • Testosterone, Free % 11/05/2021 0.8  % Final    Comment: This test was developed and its performance characteristics  determined by [facility]. It has not been cleared or approved  by the Food and Drug Administration.  Reference Range:  Adult Males: 1.5 - 3.2     • Testosterone, Free 11/05/2021 43* pg/mL Final    Comment: Reference Range:  Adult Males: 52 - 280       Assessment/Plan   {Assess/PlanSmartLinks:32241}

## 2022-01-25 NOTE — PROGRESS NOTES
Subjective   Brian Barriga is a 66 y.o. male.     History of Present Illness     Patient is a 66-year-old male who was referred for evaluation of fatigue.    About 10 to 15 years ago, he was seen by a physician in [place] with symptoms of fatigue.  He was told to have low testosterone and was treated with testosterone injections for 1 year.  Fatigue resolved and testosterone was discontinued.  He does not know the cause of the low testosterone.  MRI was not done.    In 2021 he started having more fatigue and low libido.  He was seen by Dr. Light.  Lab studies done in October and November 2021 showed testosterone liquid chromatography/mass spectrometry of 480.1 ng per DL and 539 ng per DL respectively.  Free testosterone done by mass spectrometry/dialysis were low at 5 pg/mL and 43 pg/mL respectively.    He is single and has fathered 2 adult children.  He is not planning any more children.  He denies headaches,.  He denies changes in shoes or ring size.  He denies easy bruising.  He denies muscle weakness.    He had 2 sleep studies.  It was positive with 1 study while wearing a dental appliance and it was negative when a study when he was not wearing a dental appliance.  He was seen by Dr. Freedman.    He has prostate enlargement and is on finasteride 5 mg once a day.  He has urinary frequency but no difficulty initiating urination or dribbling.    He has history of 2 strokes in 2012 and 2015 thought to be due to heterozygote MTHFR mutation.  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  He had mechanical thrombectomy.  He is on folic acid 1 mg/day.    He has known coronary disease and had previous angioplasty with stent.  He denies history of heart attack.  He is being followed by Dr. Mosqueda.    He has hyperlipidemia and is on Lipitor 40 mg a day.  He denies myalgia.  He denies history of diabetes mellitus.    He denies alcohol or tobacco abuse.  He denies drug use.  He is a  "retired  for the [employer].    He had a tick bite 3 to 4 years ago.  There was no associated rash    The following portions of the patient's history were reviewed and updated as appropriate: allergies, current medications, past family history, past medical history, past social history, past surgical history and problem list.    Review of Systems   Eyes: Negative for visual disturbance.   Respiratory: Negative for shortness of breath.    Gastrointestinal: Negative.    Endocrine: Negative for cold intolerance and heat intolerance.   Genitourinary: Positive for frequency. Negative for difficulty urinating.   Musculoskeletal: Positive for arthralgias (Left fifth finger). Negative for myalgias.   Neurological: Positive for headaches. Negative for weakness.        Right facial pain   Hematological: Negative for adenopathy. Does not bruise/bleed easily.     Objective      Vitals:    01/25/22 1507   BP: 138/78   Pulse: 74   SpO2: 98%   Weight: 72.9 kg (160 lb 12.8 oz)   Height: 175.3 cm (69.02\")     Physical Exam  Constitutional:       General: He is not in acute distress.     Appearance: Normal appearance. He is not ill-appearing, toxic-appearing or diaphoretic.   Eyes:      General: No scleral icterus.        Right eye: No discharge.         Left eye: No discharge.      Extraocular Movements: Extraocular movements intact.      Conjunctiva/sclera: Conjunctivae normal.      Comments: Field cut on the left temporal area   Neck:      Vascular: No carotid bruit.   Cardiovascular:      Rate and Rhythm: Normal rate and regular rhythm.      Pulses: Normal pulses.      Heart sounds: Normal heart sounds. No murmur heard.  No friction rub.   Pulmonary:      Effort: Pulmonary effort is normal. No respiratory distress.      Breath sounds: Normal breath sounds. No stridor. No rales.      Comments: No gynecomastia  Chest:      Chest wall: No tenderness.   Abdominal:      Palpations: Abdomen is soft.      Tenderness: There is " no abdominal tenderness. There is no right CVA tenderness or left CVA tenderness.      Comments: No prominent striae.   Genitourinary:     Testes: Normal.      Comments: Testis about 5 cm bilaterally.  Uncircumcised  Musculoskeletal:         General: No swelling or tenderness. Normal range of motion.      Cervical back: Neck supple. No rigidity or tenderness.      Right lower leg: No edema.      Left lower leg: No edema.   Lymphadenopathy:      Cervical: No cervical adenopathy.   Neurological:      General: No focal deficit present.      Mental Status: He is alert and oriented to person, place, and time.      Motor: No weakness.      Comments: Able to get up from a deep squat.       Orders Only on 11/04/2021   Component Date Value Ref Range Status   • Testosterone, Total 11/05/2021 539  ng/dL Final    Comment: This test was developed and its performance characteristics  determined by [facility]. It has not been cleared or approved  by the Food and Drug Administration.  Reference Range:  Adult Males  >18 years    264 - 916  This [facility] LC/MS-MS method is currently certified by the  CDC Hormone Standardization Program (HoST).  Adult male  reference interval is based on a population of healthy  nonobese males (BMI <30) between 19 and 39 years old.  Julio, et.al. JCEM 2017,102;3035-3340 PMID: [id number].     • Testosterone, Free % 11/05/2021 0.8  % Final    Comment: This test was developed and its performance characteristics  determined by [facility]. It has not been cleared or approved  by the Food and Drug Administration.  Reference Range:  Adult Males: 1.5 - 3.2     • Testosterone, Free 11/05/2021 43* pg/mL Final    Comment: Reference Range:  Adult Males: 52 - 280       Assessment/Plan   Diagnoses and all orders for this visit:    1. Chronic fatigue (Primary)  -     Testosterone, Free / Tot Equilib  -     Prolactin  -     Follicle Stimulating Hormone  -     Luteinizing Hormone  -     Sex Horm Binding Globulin  -      Lyme, Total Antibody Test / Reflex    2. Obstructive sleep apnea syndrome    3. Primary hypertension    4. Vitamin D deficiency    5. MTHFR gene mutation    6. Hyperlipidemia, unspecified hyperlipidemia type    7. Coronary artery disease involving native coronary artery of native heart without angina pectoris      Patient's total testosterone is elevated but free testosterone by mass spectrometry/dialysis is low.  Will check sex hormone binding globulin and reevaluate.  Sex hormone binding globulin concentrations increase gradually as a function of age.    Patient had 2 previous sleep studies with different results.  Suggest follow-up with sleep specialist for an official opinion.  Untreated sleep apnea may worsen should patient need to be on testosterone therapy.    Patient  had 2 previous strokes and he has coronary artery disease with a previous stent.  He has known heterozygous MTHFR gene mutation and is on folate therapy.  There is conflicting data in the literature about possible increased cardiovascular events in men who take testosterone.    Continue Lipitor 40 mg/day.    Copy of my note sent to Dr. Light, Dr. Mosqueda, Dr. Wong, Dr. Quinn Freedman and Dr. Celis    Total time 77 minutes

## 2022-01-31 LAB
B BURGDOR IGG+IGM SER-ACNC: <0.91 ISR (ref 0–0.9)
FSH SERPL-ACNC: 5.1 MIU/ML (ref 1.5–12.4)
IGF-I SERPL-MCNC: 76 NG/ML (ref 59–230)
LH SERPL-ACNC: 9 MIU/ML (ref 1.7–8.6)
PROLACTIN SERPL-MCNC: 12.3 NG/ML (ref 4–15.2)
SHBG SERPL-SCNC: 53.4 NMOL/L (ref 19.3–76.4)
TESTOST FREE MFR SERPL: 2.14 % (ref 1.5–4.2)
TESTOST FREE SERPL-MCNC: 10.55 NG/DL (ref 5–21)
TESTOST SERPL-MCNC: 493 NG/DL (ref 264–916)

## 2022-03-09 ENCOUNTER — TELEPHONE (OUTPATIENT)
Dept: INTERNAL MEDICINE | Facility: CLINIC | Age: 67
End: 2022-03-09

## 2022-03-09 DIAGNOSIS — K21.9 GASTROESOPHAGEAL REFLUX DISEASE: ICD-10-CM

## 2022-03-10 RX ORDER — PANTOPRAZOLE SODIUM 40 MG/1
TABLET, DELAYED RELEASE ORAL
Qty: 90 TABLET | Refills: 3 | OUTPATIENT
Start: 2022-03-10

## 2022-03-10 RX ORDER — PANTOPRAZOLE SODIUM 40 MG/1
40 TABLET, DELAYED RELEASE ORAL DAILY
Qty: 90 TABLET | Refills: 3 | Status: SHIPPED | OUTPATIENT
Start: 2022-03-10

## 2022-03-17 ENCOUNTER — OFFICE VISIT (OUTPATIENT)
Dept: INTERNAL MEDICINE | Facility: CLINIC | Age: 67
End: 2022-03-17

## 2022-03-17 VITALS
SYSTOLIC BLOOD PRESSURE: 122 MMHG | HEART RATE: 69 BPM | TEMPERATURE: 97.5 F | BODY MASS INDEX: 23.85 KG/M2 | RESPIRATION RATE: 18 BRPM | OXYGEN SATURATION: 100 % | DIASTOLIC BLOOD PRESSURE: 76 MMHG | HEIGHT: 69 IN | WEIGHT: 161 LBS

## 2022-03-17 DIAGNOSIS — Z00.00 ENCOUNTER FOR PREVENTIVE HEALTH EXAMINATION: Primary | ICD-10-CM

## 2022-03-17 DIAGNOSIS — D68.4: ICD-10-CM

## 2022-03-17 DIAGNOSIS — Z79.899 MEDICATION MANAGEMENT: ICD-10-CM

## 2022-03-17 DIAGNOSIS — I48.0 PAROXYSMAL ATRIAL FIBRILLATION: ICD-10-CM

## 2022-03-17 DIAGNOSIS — F33.8 SEASONAL AFFECTIVE DISORDER: ICD-10-CM

## 2022-03-17 DIAGNOSIS — E78.5 HYPERLIPIDEMIA, UNSPECIFIED HYPERLIPIDEMIA TYPE: ICD-10-CM

## 2022-03-17 PROCEDURE — G0439 PPPS, SUBSEQ VISIT: HCPCS | Performed by: INTERNAL MEDICINE

## 2022-03-17 PROCEDURE — 99397 PER PM REEVAL EST PAT 65+ YR: CPT | Performed by: INTERNAL MEDICINE

## 2022-03-17 PROCEDURE — 1170F FXNL STATUS ASSESSED: CPT | Performed by: INTERNAL MEDICINE

## 2022-03-17 PROCEDURE — 1160F RVW MEDS BY RX/DR IN RCRD: CPT | Performed by: INTERNAL MEDICINE

## 2022-03-17 NOTE — PROGRESS NOTES
Subjective   Brian Barriga is a 66 y.o. male.     Chief Complaint   Patient presents with   • Medicare Wellness-subsequent   • Hyperlipidemia   • Hypertension         In for annual preventative exam.  He sleeps well.  8 hours at night.  Exercises 6 days per week.  Exercises pretty aggressively.  Diet is good.  Energy is good.    Hyperlipidemia  This is a chronic problem. Pertinent negatives include no chest pain, myalgias or shortness of breath.   Hypertension  This is a chronic problem. The current episode started more than 1 year ago. Pertinent negatives include no chest pain, headaches, palpitations or shortness of breath.        The following portions of the patient's history were reviewed and updated as appropriate: allergies, current medications, past social history and problem list.      Current Outpatient Medications:   •  ALLERGY SERUM INJECTION, Inject  under the skin into the appropriate area as directed 1 (One) Time Per Week., Disp: , Rfl:   •  aspirin 81 MG EC tablet, Take 81 mg by mouth Daily., Disp: , Rfl:   •  atorvastatin (LIPITOR) 40 MG tablet, TAKE 1 TABLET BY MOUTH EVERY MORNING, Disp: 90 tablet, Rfl: 1  •  dorzolamide-timolol (COSOPT) 22.3-6.8 MG/ML ophthalmic solution, , Disp: , Rfl:   •  finasteride (PROSCAR) 5 MG tablet, Take 1 tablet by mouth daily., Disp: , Rfl:   •  folic acid (FOLVITE) 1 MG tablet, TAKE 1 TABLET BY MOUTH EVERY DAY, Disp: 90 tablet, Rfl: 1  •  ketorolac (ACULAR) 0.5 % ophthalmic solution, Administer 1 drop to both eyes 2 (Two) Times a Day., Disp: , Rfl:   •  levocetirizine (XYZAL) 5 MG tablet, TAKE 1 TABLET BY MOUTH EVERY EVENING, Disp: 90 tablet, Rfl: 0  •  melatonin 5 MG tablet tablet, Take 5 mg by mouth At Night As Needed for Sleep., Disp: , Rfl:   •  montelukast (SINGULAIR) 10 MG tablet, TAKE 1 TABLET BY MOUTH DAILY, Disp: 90 tablet, Rfl: 1  •  Multiple Vitamin (MULTI VITAMIN DAILY PO), Take 1 tablet by mouth Daily., Disp: , Rfl:   •  OXcarbazepine (TRILEPTAL) 150 MG  tablet, Take 300 mg by mouth 2 (Two) Times a Day., Disp: , Rfl:   •  pantoprazole (PROTONIX) 40 MG EC tablet, Take 1 tablet by mouth Daily., Disp: 90 tablet, Rfl: 3  •  sildenafil (VIAGRA) 100 MG tablet, Take 100 mg by mouth Daily As Needed for Erectile Dysfunction., Disp: , Rfl:   •  valACYclovir (VALTREX) 1000 MG tablet, Take 1,000 mg by mouth., Disp: , Rfl:   •  vitamin D (ERGOCALCIFEROL) 1.25 MG (68516 UT) capsule capsule, TAKE 1 CAPSULE BY MOUTH ONCE EVERY WEEK, Disp: 13 capsule, Rfl: 3    Review of Systems   Constitutional: Negative for chills, diaphoresis, fatigue, fever and unexpected weight change.   Respiratory: Negative for cough, chest tightness, shortness of breath and wheezing.    Cardiovascular: Negative for chest pain, palpitations and leg swelling.   Gastrointestinal: Negative for abdominal pain, anal bleeding, blood in stool, constipation, diarrhea, nausea, rectal pain and vomiting.   Endocrine: Negative for cold intolerance, heat intolerance and polyuria.   Genitourinary: Negative for difficulty urinating, dysuria, frequency, hematuria and urgency.   Musculoskeletal: Negative for arthralgias, back pain and myalgias.   Allergic/Immunologic: Negative for environmental allergies.   Neurological: Negative for dizziness, syncope, weakness, light-headedness and headaches.   Hematological: Negative for adenopathy. Does not bruise/bleed easily.   Psychiatric/Behavioral: Positive for dysphoric mood. Negative for confusion and sleep disturbance. The patient is not nervous/anxious.        Objective   Vitals:    03/17/22 0826   BP: 122/76   Pulse: 69   Resp: 18   Temp: 97.5 °F (36.4 °C)   SpO2: 100%          03/17/22 0826   Weight: 73 kg (161 lb)    [unfilled]  Body mass index is 23.78 kg/m².      Physical Exam   Constitutional: He is oriented to person, place, and time. He appears well-developed.   HENT:   Head: Normocephalic and atraumatic.   Right Ear: External ear normal.   Left Ear: External ear  normal.   Nose: Nose normal.   Eyes: Pupils are equal, round, and reactive to light. Conjunctivae are normal. No scleral icterus.   Neck: No JVD present. No thyromegaly present.   Cardiovascular: Normal rate, regular rhythm and normal heart sounds. Exam reveals no gallop and no friction rub.   No murmur heard.  Pulmonary/Chest: Effort normal and breath sounds normal. No respiratory distress. He has no wheezes. He has no rales.   Abdominal: Soft. Normal appearance and bowel sounds are normal. He exhibits no distension and no mass. There is no abdominal tenderness. There is no rebound and no guarding. No hernia.   Musculoskeletal: Normal range of motion.     Vascular Status -  His right foot exhibits abnormal foot edema (1+). His left foot exhibits abnormal foot edema (1+).  Lymphadenopathy:     He has no cervical adenopathy.   Neurological: He is alert and oriented to person, place, and time. He has normal reflexes. He displays normal reflexes.   Skin: Skin is warm and dry.   Psychiatric: His behavior is normal. Mood, judgment and thought content normal.   Nursing note and vitals reviewed.        Problems Addressed this Visit        Cardiac and Vasculature    Paroxysmal atrial fibrillation (HCC)       Coag and Thromboembolic    Deficiency of clotting factor (HCC)       Mental Health    Seasonal affective disorder (HCC)      Other Visit Diagnoses     Encounter for preventive health examination    -  Primary      Diagnoses       Codes Comments    Encounter for preventive health examination    -  Primary ICD-10-CM: Z00.00  ICD-9-CM: V70.0     Seasonal affective disorder (HCC)     ICD-10-CM: F33.8  ICD-9-CM: 296.99     Paroxysmal atrial fibrillation (HCC)     ICD-10-CM: I48.0  ICD-9-CM: 427.31     Deficiency of clotting factor (HCC)     ICD-10-CM: D68.4  ICD-9-CM: 286.9         Assessment/Plan   In for annual preventive exam.  Overall he presents with a variety of complex issues.  He has a history of hypertension,  paroxysmal atrial fibrillation, and hypercoagulable state with MTHFR mutation and elevated homocystine.  Had a series of strokes 2012 and 2014 and was treated with thrombolytics and clot extraction systemic thrombolytics again.  He's got two stents in the left carotid system.  He has coronary artery disease with a recent stent placed in the LAD following an elevated coronary calcium score.  Obstructive sleep apnea.  GERD.  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  He sees Dr. Mata for urology purposes and  for GI purposes.  He's had quite a bit of lab work which is up-to-date.  He'll need a lipid profile every 3 months.  Annual lab work today March 2022 including lipids and CMP.  CBC is up-to-date.  He is on aspirin low for a coagulation at this point.  We discussed dual anticoagulation and triple anticoagulation today.  I think with his history certainly sounds like he should be on eliquis.  I would give him some heart attack prevention, stroke prevention.  He'll need to discuss this with his cardiology group and hematology group.  He's due for another pancreatic protocol CT to keep an eye on his IPMN.  He knows next her he'll be on Medicare and Medicare does not cover preventative exams.  He is okay with that.  Annual wellness visit today March 2022.  Due for second Shingrix shot.  Recheck in 3 months.  He is fasting today.    Prevention counseling was performed today. The counseling performed was routine health maintenance topics including BMI and exercise.    The above information was reviewed again today 03/17/22.  It continues to be accurate as reflected above and is unchanged.  History, physical and review of systems all reviewed and are unchanged.  Medications were reviewed today and continue the current dosing.    PPE today includes face mask and eye shield.

## 2022-03-17 NOTE — PROGRESS NOTES
The ABCs of the Annual Wellness Visit  Subsequent Medicare Wellness Visit    Chief Complaint   Patient presents with   • Medicare Wellness-subsequent   • Hyperlipidemia   • Hypertension      Subjective    History of Present Illness:  Brian Barriga is a 66 y.o. male who presents for a Subsequent Medicare Wellness Visit.    The following portions of the patient's history were reviewed and   updated as appropriate: allergies, current medications, past family history, past medical history, past social history, past surgical history and problem list.    Compared to one year ago, the patient feels his physical   health is the same.    Compared to one year ago, the patient feels his mental   health is the same.    Recent Hospitalizations:  He was not admitted to the hospital during the last year.       Current Medical Providers:  Patient Care Team:  Robert Light MD as PCP - General (Internal Medicine)  Robert Light MD as PCP - Internal Medicine (Internal Medicine)  Darrell Mosqueda MD as Consulting Physician (Cardiology)  Pete Reis MD as Consulting Physician (Allergy and Immunology)  Sam Lim (Optometry)  Can Solitario MD as Consulting Physician (Endocrinology)  Shawn Wong MD as Consulting Physician (Neurosurgery)  Anand Celis DO as Consulting Physician (Neurology)    Outpatient Medications Prior to Visit   Medication Sig Dispense Refill   • ALLERGY SERUM INJECTION Inject  under the skin into the appropriate area as directed 1 (One) Time Per Week.     • aspirin 81 MG EC tablet Take 81 mg by mouth Daily.     • atorvastatin (LIPITOR) 40 MG tablet TAKE 1 TABLET BY MOUTH EVERY MORNING 90 tablet 1   • dorzolamide-timolol (COSOPT) 22.3-6.8 MG/ML ophthalmic solution      • finasteride (PROSCAR) 5 MG tablet Take 1 tablet by mouth daily.     • folic acid (FOLVITE) 1 MG tablet TAKE 1 TABLET BY MOUTH EVERY DAY 90 tablet 1   • ketorolac (ACULAR) 0.5 % ophthalmic solution Administer 1 drop  to both eyes 2 (Two) Times a Day.     • levocetirizine (XYZAL) 5 MG tablet TAKE 1 TABLET BY MOUTH EVERY EVENING 90 tablet 0   • melatonin 5 MG tablet tablet Take 5 mg by mouth At Night As Needed for Sleep.     • montelukast (SINGULAIR) 10 MG tablet TAKE 1 TABLET BY MOUTH DAILY 90 tablet 1   • Multiple Vitamin (MULTI VITAMIN DAILY PO) Take 1 tablet by mouth Daily.     • OXcarbazepine (TRILEPTAL) 150 MG tablet Take 150 mg by mouth 2 (Two) Times a Day.     • pantoprazole (PROTONIX) 40 MG EC tablet Take 1 tablet by mouth Daily. 90 tablet 3   • prednisoLONE acetate (PRED FORTE) 1 % ophthalmic suspension      • sildenafil (VIAGRA) 100 MG tablet Take 100 mg by mouth Daily As Needed for Erectile Dysfunction.     • valACYclovir (VALTREX) 1000 MG tablet Take 1,000 mg by mouth.     • vitamin D (ERGOCALCIFEROL) 1.25 MG (38471 UT) capsule capsule TAKE 1 CAPSULE BY MOUTH ONCE EVERY WEEK 13 capsule 3     No facility-administered medications prior to visit.       No opioid medication identified on active medication list. I have reviewed chart for other potential  high risk medication/s and harmful drug interactions in the elderly.          Aspirin is on active medication list. Aspirin use is indicated based on review of current medical condition/s. Pros and cons of this therapy have been discussed today. Benefits of this medication outweigh potential harm.  Patient has been encouraged to continue taking this medication.  .      Patient Active Problem List   Diagnosis   • Allergic rhinitis due to pollen   • Asthma   • Gastroesophageal reflux disease   • Hypertension   • Obstructive sleep apnea syndrome   • Paroxysmal atrial fibrillation (HCC)   • Hypercoagulable state (HCC)   • Vitamin D deficiency   • Hyperlipidemia   • CAD S/P percutaneous coronary angioplasty   • Stroke syndrome   • Benign prostatic hypertrophy   • Intraductal papillary mucinous neoplasm   • Deficiency of clotting factor (HCC)   • Anemia   • TMJ  "(temporomandibular joint disorder)   • Seasonal affective disorder (HCC)   • Heterozygous for MTHFR gene mutation   • IPMN (intraductal papillary mucinous neoplasm)   • Pancreatic cyst   • Encounter for screening for malignant neoplasm of colon   • MTHFR gene mutation     Advance Care Planning  Advance Directive is not on file.  ACP discussion was held with the patient during this visit. Patient has an advance directive (not in EMR), copy requested.          Objective    Vitals:    03/17/22 0826   Resp: 18   Temp: 97.5 °F (36.4 °C)   TempSrc: Temporal   Weight: 73 kg (161 lb)   Height: 175.3 cm (69\")     BMI Readings from Last 1 Encounters:   03/17/22 23.78 kg/m²   BMI is within normal parameters. No follow-up required.    Does the patient have evidence of cognitive impairment? No    Physical Exam            HEALTH RISK ASSESSMENT    Smoking Status:  Social History     Tobacco Use   Smoking Status Never Smoker   Smokeless Tobacco Never Used     Alcohol Consumption:  Social History     Substance and Sexual Activity   Alcohol Use No     Fall Risk Screen:    JUAN JADI Fall Risk Assessment has not been completed.    Depression Screening:  PHQ-2/PHQ-9 Depression Screening 1/28/2021   Retired Total Score 0       Health Habits and Functional and Cognitive Screening:  Functional & Cognitive Status 1/28/2021   Do you have difficulty preparing food and eating? No   Do you have difficulty bathing yourself, getting dressed or grooming yourself? No   Do you have difficulty using the toilet? No   Do you have difficulty moving around from place to place? No   Do you have trouble with steps or getting out of a bed or a chair? No   Current Diet Well Balanced Diet   Dental Exam Up to date   Eye Exam Up to date   Exercise (times per week) 7 times per week   Current Exercises Include Swimming   Current Exercise Activities Include -   Do you need help using the phone?  No   Are you deaf or do you have serious difficulty hearing?  No   Do " you need help with transportation? No   Do you need help shopping? No   Do you need help preparing meals?  No   Do you need help with housework?  No   Do you need help with laundry? No   Do you need help taking your medications? No   Do you need help managing money? No   Do you ever drive or ride in a car without wearing a seat belt? No   Have you felt unusual stress, anger or loneliness in the last month? No   Who do you live with? Alone   If you need help, do you have trouble finding someone available to you? No   Have you been bothered in the last four weeks by sexual problems? No   Do you have difficulty concentrating, remembering or making decisions? Yes       Age-appropriate Screening Schedule:  Refer to the list below for future screening recommendations based on patient's age, sex and/or medical conditions. Orders for these recommended tests are listed in the plan section. The patient has been provided with a written plan.    Health Maintenance   Topic Date Due   • ZOSTER VACCINE (3 of 3) 02/27/2020   • LIPID PANEL  10/05/2022   • TDAP/TD VACCINES (3 - Td or Tdap) 07/25/2028   • INFLUENZA VACCINE  Completed              Assessment/Plan   CMS Preventative Services Quick Reference  Risk Factors Identified During Encounter  Fall Risk-High or Moderate  The above risks/problems have been discussed with the patient.  Follow up actions/plans if indicated are seen below in the Assessment/Plan Section.  Pertinent information has been shared with the patient in the After Visit Summary.    There are no diagnoses linked to this encounter.    Follow Up:   No follow-ups on file.     An After Visit Summary and PPPS were made available to the patient.        I spent 15 minutes caring for Brian on this date of service. This time includes time spent by me in the following activities:preparing for the visit

## 2022-03-18 LAB
ALBUMIN SERPL-MCNC: 4.1 G/DL (ref 3.8–4.8)
ALBUMIN/GLOB SERPL: 2 {RATIO} (ref 1.2–2.2)
ALP SERPL-CCNC: 110 IU/L (ref 44–121)
ALT SERPL-CCNC: 20 IU/L (ref 0–44)
AST SERPL-CCNC: 28 IU/L (ref 0–40)
BILIRUB SERPL-MCNC: 0.4 MG/DL (ref 0–1.2)
BUN SERPL-MCNC: 9 MG/DL (ref 8–27)
BUN/CREAT SERPL: 10 (ref 10–24)
CALCIUM SERPL-MCNC: 8.8 MG/DL (ref 8.6–10.2)
CHLORIDE SERPL-SCNC: 104 MMOL/L (ref 96–106)
CHOLEST SERPL-MCNC: 146 MG/DL (ref 100–199)
CHOLEST/HDLC SERPL: 2.6 RATIO (ref 0–5)
CO2 SERPL-SCNC: 24 MMOL/L (ref 20–29)
CREAT SERPL-MCNC: 0.93 MG/DL (ref 0.76–1.27)
EGFRCR SERPLBLD CKD-EPI 2021: 90 ML/MIN/1.73
GLOBULIN SER CALC-MCNC: 2.1 G/DL (ref 1.5–4.5)
GLUCOSE SERPL-MCNC: 81 MG/DL (ref 65–99)
HDLC SERPL-MCNC: 57 MG/DL
LDLC SERPL CALC-MCNC: 72 MG/DL (ref 0–99)
POTASSIUM SERPL-SCNC: 4.5 MMOL/L (ref 3.5–5.2)
PROT SERPL-MCNC: 6.2 G/DL (ref 6–8.5)
SODIUM SERPL-SCNC: 142 MMOL/L (ref 134–144)
TRIGL SERPL-MCNC: 94 MG/DL (ref 0–149)
VLDLC SERPL CALC-MCNC: 17 MG/DL (ref 5–40)

## 2022-04-19 RX ORDER — ATORVASTATIN CALCIUM 40 MG/1
TABLET, FILM COATED ORAL
Qty: 90 TABLET | Refills: 1 | Status: SHIPPED | OUTPATIENT
Start: 2022-04-19 | End: 2022-06-13 | Stop reason: SDUPTHER

## 2022-06-13 ENCOUNTER — OFFICE VISIT (OUTPATIENT)
Dept: INTERNAL MEDICINE | Facility: CLINIC | Age: 67
End: 2022-06-13

## 2022-06-13 VITALS
WEIGHT: 163 LBS | OXYGEN SATURATION: 98 % | HEART RATE: 66 BPM | SYSTOLIC BLOOD PRESSURE: 140 MMHG | HEIGHT: 69 IN | BODY MASS INDEX: 24.14 KG/M2 | TEMPERATURE: 97.3 F | RESPIRATION RATE: 16 BRPM | DIASTOLIC BLOOD PRESSURE: 82 MMHG

## 2022-06-13 DIAGNOSIS — E78.5 HYPERLIPIDEMIA, UNSPECIFIED HYPERLIPIDEMIA TYPE: Chronic | ICD-10-CM

## 2022-06-13 DIAGNOSIS — I10 PRIMARY HYPERTENSION: Primary | Chronic | ICD-10-CM

## 2022-06-13 DIAGNOSIS — I48.0 PAROXYSMAL ATRIAL FIBRILLATION: ICD-10-CM

## 2022-06-13 DIAGNOSIS — D68.59 HYPERCOAGULABLE STATE: Chronic | ICD-10-CM

## 2022-06-13 PROCEDURE — 99214 OFFICE O/P EST MOD 30 MIN: CPT | Performed by: INTERNAL MEDICINE

## 2022-06-13 RX ORDER — OXCARBAZEPINE 300 MG/1
450 TABLET, FILM COATED ORAL 2 TIMES DAILY
Qty: 270 TABLET | Refills: 1 | Status: SHIPPED | OUTPATIENT
Start: 2022-06-13 | End: 2022-10-21

## 2022-06-13 RX ORDER — TADALAFIL 5 MG/1
5 TABLET ORAL DAILY
Qty: 90 TABLET | Refills: 3 | Status: SHIPPED | OUTPATIENT
Start: 2022-06-13

## 2022-06-13 RX ORDER — ATORVASTATIN CALCIUM 80 MG/1
80 TABLET, FILM COATED ORAL EVERY MORNING
Qty: 90 TABLET | Refills: 1 | Status: SHIPPED | OUTPATIENT
Start: 2022-06-13

## 2022-06-13 NOTE — PROGRESS NOTES
Subjective   Brian Barriga is a 66 y.o. male.     Chief Complaint   Patient presents with   • Hypertension   • Hyperlipidemia   • Coronary Artery Disease         History of MTHFR gene mutation.  History of NSAID which is been stable.    Hypertension  This is a chronic problem. The current episode started more than 1 year ago. The problem is unchanged. The problem is controlled. Associated symptoms include headaches. Pertinent negatives include no chest pain, palpitations, peripheral edema or shortness of breath. Hypertensive end-organ damage includes CAD/MI and CVA. There is no history of angina or kidney disease.   Hyperlipidemia  This is a chronic problem. The current episode started more than 1 year ago. The problem is controlled. Recent lipid tests were reviewed and are normal. Factors aggravating his hyperlipidemia include beta blockers. Pertinent negatives include no chest pain or shortness of breath.   Coronary Artery Disease  Presents for follow-up visit. Pertinent negatives include no chest pain, chest pressure, chest tightness, leg swelling, palpitations or shortness of breath. Risk factors include hyperlipidemia.   Atrial Fibrillation  Presents for follow-up visit. Symptoms are negative for chest pain, palpitations and shortness of breath. The symptoms have been stable. Past medical history includes atrial fibrillation, CAD and hyperlipidemia. There are no medication compliance problems.   Trigeminal Neuralgia  This is a chronic problem. Associated symptoms include fatigue and headaches. Pertinent negatives include no chest pain or coughing.   Anemia  Presents for follow-up visit. There has been no palpitations.   Hyperglycemia  This is a chronic problem. The current episode started more than 1 month ago. Associated symptoms include fatigue and headaches. Pertinent negatives include no chest pain or coughing.        The following portions of the patient's history were reviewed and updated as  appropriate: allergies, current medications, past social history and problem list.    Outpatient Medications Marked as Taking for the 6/13/22 encounter (Office Visit) with Robert Light MD   Medication Sig Dispense Refill   • ALLERGY SERUM INJECTION Inject  under the skin into the appropriate area as directed 1 (One) Time Per Week.     • aspirin 81 MG EC tablet Take 81 mg by mouth Daily.     • atorvastatin (LIPITOR) 80 MG tablet Take 1 tablet by mouth Every Morning. 90 tablet 1   • dorzolamide-timolol (COSOPT) 22.3-6.8 MG/ML ophthalmic solution      • finasteride (PROSCAR) 5 MG tablet Take 1 tablet by mouth daily.     • folic acid (FOLVITE) 1 MG tablet TAKE 1 TABLET BY MOUTH EVERY DAY 90 tablet 1   • levocetirizine (XYZAL) 5 MG tablet TAKE 1 TABLET BY MOUTH EVERY EVENING 90 tablet 0   • melatonin 5 MG tablet tablet Take 5 mg by mouth At Night As Needed for Sleep.     • montelukast (SINGULAIR) 10 MG tablet TAKE 1 TABLET BY MOUTH DAILY 90 tablet 1   • Multiple Vitamin (MULTI VITAMIN DAILY PO) Take 1 tablet by mouth Daily.     • OXcarbazepine (TRILEPTAL) 300 MG tablet Take 1.5 tablets by mouth 2 (Two) Times a Day. 270 tablet 1   • pantoprazole (PROTONIX) 40 MG EC tablet Take 1 tablet by mouth Daily. 90 tablet 3   • tadalafil (Cialis) 5 MG tablet Take 1 tablet by mouth Daily. 90 tablet 3   • valACYclovir (VALTREX) 1000 MG tablet Take 1,000 mg by mouth.     • vitamin D (ERGOCALCIFEROL) 1.25 MG (88708 UT) capsule capsule TAKE 1 CAPSULE BY MOUTH ONCE EVERY WEEK 13 capsule 3   • [DISCONTINUED] ketorolac (ACULAR) 0.5 % ophthalmic solution Administer 1 drop to both eyes 2 (Two) Times a Day.     • [DISCONTINUED] sildenafil (VIAGRA) 100 MG tablet Take 100 mg by mouth Daily As Needed for Erectile Dysfunction.         Review of Systems   Constitutional: Positive for fatigue.   Respiratory: Negative for cough, chest tightness and shortness of breath.    Cardiovascular: Negative for chest pain, palpitations and leg swelling.    Gastrointestinal: Negative for constipation and diarrhea.   Neurological: Positive for headaches.       Objective   There were no vitals filed for this visit.   There were no vitals filed for this visit. [unfilled]  There is no height or weight on file to calculate BMI.      Physical Exam   Constitutional: He appears well-developed.   Neck: No thyromegaly present.   Cardiovascular: Normal rate and normal heart sounds. An irregular rhythm present. Exam reveals no gallop.   No murmur heard.  Pulmonary/Chest: Effort normal and breath sounds normal. No respiratory distress. He has no wheezes. He has no rales.   Abdominal: Soft. Normal appearance and bowel sounds are normal. He exhibits no mass. There is no abdominal tenderness. There is no guarding.   Neurological: He is alert.         Problems Addressed this Visit        Cardiac and Vasculature    Hypertension - Primary (Chronic)    Hyperlipidemia (Chronic)    Relevant Medications    atorvastatin (LIPITOR) 80 MG tablet    Paroxysmal atrial fibrillation (HCC)    Relevant Medications    tadalafil (Cialis) 5 MG tablet       Coag and Thromboembolic    Hypercoagulable state (HCC) (Chronic)      Diagnoses       Codes Comments    Primary hypertension    -  Primary ICD-10-CM: I10  ICD-9-CM: 401.9     Hyperlipidemia, unspecified hyperlipidemia type     ICD-10-CM: E78.5  ICD-9-CM: 272.4     Paroxysmal atrial fibrillation (HCC)     ICD-10-CM: I48.0  ICD-9-CM: 427.31     Hypercoagulable state (HCC)     ICD-10-CM: D68.59  ICD-9-CM: 289.81         Assessment & Plan   In for recheck of multiple medical problems.  Hypertension, hyperlipidemia, CAD and PAF.  History of stroke and hypercoagulable state.  SAD.  Heterozygous MTHFR gene mutation.  Blood pressure control is excellent.  Lipids are excelllent.  No arrhythmias.   Had annual lab work 3/2022.  Gets lipids every 6 months.  Lipids have been very stable.  Urologist checks his PSA and prostate exam.  Has no trouble with spin  class and exercise.  Fatigue is still present and the seem to start around February 2021.  Prior to that his fatigue was on and off.  Seems worse now.  He is actually had fatigue for years.  This is documented in the records.  Previous 24-hour Holter was unrevealing.  That was 2018.  I explained to patient I have no good explanation for his fatigue other than perhaps a mood disorder.  We could consider a fasting testosterone level given his chronic mild anemia.  He has Medicare but is a federal plan so his preventive exam should be covered in the future.  SAD is stable.  History of MTHFR gene mutation.  Annual wellness visit and annual preventive exam March 2022.  TGN persists and he is now getting ready for microvascular decompression for that.  He is fasting today.    The above information was reviewed again today 06/13/22.  It continues to be accurate as reflected above and is unchanged.  History, physical and review of systems all reviewed and are unchanged.  Medications were reviewed today and continue the current dosing.    PPE today includes face mask and eye shield.         Dragon disclaimer:   Much of this encounter note is an electronic transcription/translation of spoken language to printed text. The electronic translation of spoken language may permit erroneous, or at times, nonsensical words or phrases to be inadvertently transcribed; Although I have reviewed the note for such errors, some may still exist.

## 2022-07-18 DIAGNOSIS — E78.5 HYPERLIPIDEMIA: ICD-10-CM

## 2022-07-18 RX ORDER — FOLIC ACID 1 MG/1
TABLET ORAL
Qty: 90 TABLET | Refills: 1 | Status: SHIPPED | OUTPATIENT
Start: 2022-07-18 | End: 2023-01-16

## 2022-09-15 ENCOUNTER — OFFICE VISIT (OUTPATIENT)
Dept: INTERNAL MEDICINE | Facility: CLINIC | Age: 67
End: 2022-09-15

## 2022-09-15 VITALS
TEMPERATURE: 98 F | HEART RATE: 57 BPM | HEIGHT: 69 IN | OXYGEN SATURATION: 98 % | WEIGHT: 158 LBS | BODY MASS INDEX: 23.4 KG/M2 | RESPIRATION RATE: 16 BRPM | SYSTOLIC BLOOD PRESSURE: 120 MMHG | DIASTOLIC BLOOD PRESSURE: 72 MMHG

## 2022-09-15 DIAGNOSIS — I25.10 CAD S/P PERCUTANEOUS CORONARY ANGIOPLASTY: ICD-10-CM

## 2022-09-15 DIAGNOSIS — Z98.61 CAD S/P PERCUTANEOUS CORONARY ANGIOPLASTY: ICD-10-CM

## 2022-09-15 DIAGNOSIS — I10 PRIMARY HYPERTENSION: Primary | Chronic | ICD-10-CM

## 2022-09-15 DIAGNOSIS — E78.5 HYPERLIPIDEMIA, UNSPECIFIED HYPERLIPIDEMIA TYPE: Chronic | ICD-10-CM

## 2022-09-15 PROCEDURE — 99214 OFFICE O/P EST MOD 30 MIN: CPT | Performed by: INTERNAL MEDICINE

## 2022-09-15 NOTE — PROGRESS NOTES
Subjective   Brian Barriga is a 67 y.o. male.     Chief Complaint   Patient presents with   • Hyperlipidemia   • Hypertension   • Coronary Artery Disease         History of Present Illness  History of MTHFR gene mutation.  History of NSAID which is been stable.  Hyperlipidemia  This is a chronic problem. The current episode started more than 1 year ago. The problem is controlled. Recent lipid tests were reviewed and are normal. Factors aggravating his hyperlipidemia include beta blockers. Pertinent negatives include no chest pain or shortness of breath.   Hypertension  This is a chronic problem. The current episode started more than 1 year ago. The problem is unchanged. The problem is controlled. Associated symptoms include headaches. Pertinent negatives include no chest pain, palpitations, peripheral edema or shortness of breath. Hypertensive end-organ damage includes CAD/MI and CVA. There is no history of angina or kidney disease.   Coronary Artery Disease  Presents for follow-up visit. Pertinent negatives include no chest pain, chest pressure, chest tightness, leg swelling, palpitations or shortness of breath. Risk factors include hyperlipidemia.   Atrial Fibrillation  Presents for follow-up visit. Symptoms are negative for chest pain, palpitations and shortness of breath. The symptoms have been stable. Past medical history includes atrial fibrillation, CAD and hyperlipidemia. There are no medication compliance problems.   Trigeminal Neuralgia  This is a chronic problem. Associated symptoms include fatigue and headaches. Pertinent negatives include no chest pain or coughing.   Anemia  Presents for follow-up visit. There has been no palpitations.   Hyperglycemia  This is a chronic problem. The current episode started more than 1 month ago. Associated symptoms include fatigue and headaches. Pertinent negatives include no chest pain or coughing.        The following portions of the patient's history were reviewed  and updated as appropriate: allergies, current medications, past social history and problem list.    Outpatient Medications Marked as Taking for the 9/15/22 encounter (Office Visit) with Robert Light MD   Medication Sig Dispense Refill   • ALLERGY SERUM INJECTION Inject  under the skin into the appropriate area as directed 1 (One) Time Per Week.     • aspirin 81 MG EC tablet Take 81 mg by mouth Daily.     • atorvastatin (LIPITOR) 80 MG tablet Take 1 tablet by mouth Every Morning. 90 tablet 1   • dorzolamide-timolol (COSOPT) 22.3-6.8 MG/ML ophthalmic solution      • finasteride (PROSCAR) 5 MG tablet Take 1 tablet by mouth daily.     • folic acid (FOLVITE) 1 MG tablet TAKE 1 TABLET BY MOUTH EVERY DAY 90 tablet 1   • levocetirizine (XYZAL) 5 MG tablet TAKE 1 TABLET BY MOUTH EVERY EVENING 90 tablet 0   • melatonin 5 MG tablet tablet Take 5 mg by mouth At Night As Needed for Sleep.     • montelukast (SINGULAIR) 10 MG tablet TAKE 1 TABLET BY MOUTH DAILY 90 tablet 1   • Multiple Vitamin (MULTI VITAMIN DAILY PO) Take 1 tablet by mouth Daily.     • OXcarbazepine (TRILEPTAL) 300 MG tablet Take 1.5 tablets by mouth 2 (Two) Times a Day. 270 tablet 1   • pantoprazole (PROTONIX) 40 MG EC tablet Take 1 tablet by mouth Daily. 90 tablet 3   • tadalafil (Cialis) 5 MG tablet Take 1 tablet by mouth Daily. 90 tablet 3   • valACYclovir (VALTREX) 1000 MG tablet Take 1,000 mg by mouth.     • vitamin D (ERGOCALCIFEROL) 1.25 MG (36151 UT) capsule capsule TAKE 1 CAPSULE BY MOUTH ONCE EVERY WEEK 13 capsule 3       Review of Systems   Constitutional: Positive for fatigue.   Respiratory: Negative for cough, chest tightness and shortness of breath.    Cardiovascular: Negative for chest pain, palpitations and leg swelling.   Gastrointestinal: Negative for constipation and diarrhea.   Neurological: Positive for headaches.       Objective   Vitals:    09/15/22 0813   BP: 120/72   Pulse: 57   Resp: 16   Temp: 98 °F (36.7 °C)   SpO2: 98%           09/15/22  0813   Weight: 71.7 kg (158 lb)    [unfilled]  Body mass index is 23.33 kg/m².      Physical Exam   Constitutional: He appears well-developed.   Neck: No thyromegaly present.   Cardiovascular: Normal rate and normal heart sounds. An irregular rhythm present. Exam reveals no gallop.   No murmur heard.  Pulmonary/Chest: Effort normal and breath sounds normal. No respiratory distress. He has no wheezes. He has no rales.   Abdominal: Soft. Normal appearance and bowel sounds are normal. He exhibits no mass. There is no abdominal tenderness. There is no guarding.   Neurological: He is alert.         Problems Addressed this Visit        Cardiac and Vasculature    Hypertension - Primary (Chronic)    Hyperlipidemia (Chronic)    CAD S/P percutaneous coronary angioplasty      Diagnoses       Codes Comments    Primary hypertension    -  Primary ICD-10-CM: I10  ICD-9-CM: 401.9     Hyperlipidemia, unspecified hyperlipidemia type     ICD-10-CM: E78.5  ICD-9-CM: 272.4     CAD S/P percutaneous coronary angioplasty     ICD-10-CM: I25.10, Z98.61  ICD-9-CM: 414.01, V45.82         Assessment & Plan   In for recheck of hypertension, hyperlipidemia, CAD and PAF.  History of stroke and hypercoagulable state.  SAD.  Heterozygous MTHFR gene mutation.  Blood pressure control is excellent.  Lipids are excelllent.  No arrhythmias.   Had annual lab work 3/2022.  Gets lipids every 6 months.  Lipids have been very stable.  Urologist checks his PSA and prostate exam.  Fatigue is still present and the seem to start around February 2021.  That is better now.  He is actually had fatigue for years.  This is documented in the records.  Previous 24-hour Holter was unrevealing.  That was 2018.  I explained to patient I have no good explanation for his fatigue other than perhaps a mood disorder.  We could consider a fasting testosterone level given his chronic mild anemia.  He has Medicare but is a federal plan so his preventive exam should  be covered in the future.  SAD is stable.  History of MTHFR gene mutation.  Annual wellness visit and annual preventive exam March 2022.  Decompression for TGN worked great and he is now asymptomatic.  He is fasting today.    The above information was reviewed again today 09/15/22.  It continues to be accurate as reflected above and is unchanged.  History, physical and review of systems all reviewed and are unchanged.  Medications were reviewed today and continue the current dosing.    PPE today includes face mask and eye shield.           Dragon disclaimer:   Much of this encounter note is an electronic transcription/translation of spoken language to printed text. The electronic translation of spoken language may permit erroneous, or at times, nonsensical words or phrases to be inadvertently transcribed; Although I have reviewed the note for such errors, some may still exist.

## 2022-09-16 LAB
CHOLEST SERPL-MCNC: 122 MG/DL (ref 0–200)
CHOLEST/HDLC SERPL: 2.44 {RATIO}
HDLC SERPL-MCNC: 50 MG/DL (ref 40–60)
LDLC SERPL CALC-MCNC: 59 MG/DL (ref 0–100)
TRIGL SERPL-MCNC: 62 MG/DL (ref 0–150)
VLDLC SERPL CALC-MCNC: 13 MG/DL (ref 5–40)

## 2022-10-14 ENCOUNTER — TELEPHONE (OUTPATIENT)
Dept: ORTHOPEDIC SURGERY | Facility: CLINIC | Age: 67
End: 2022-10-14

## 2022-10-14 NOTE — TELEPHONE ENCOUNTER
Sx likely viral.  I would just take tylenol and antihistamines for now.  Zithromax will stay in his system for 10 days following the start of the pack.

## 2022-10-14 NOTE — TELEPHONE ENCOUNTER
Patient states he was seen been having symptoms of chills, and congestion for 1 week. Patient was seen in the Immediate care 1 week ago and was prescribed an Z-Pack and Amoxicillin.      Patient did complete z-pack 1 week ago but never started his antibiotic. Along with Z-Pack, but patient didn't not take the antibiotic. Patient would like to know if he should go ahead and complete the antibiotic that was prescribed, or would like another Z-Pack to use again for symptoms

## 2022-10-17 RX ORDER — ATORVASTATIN CALCIUM 40 MG/1
TABLET, FILM COATED ORAL
Qty: 90 TABLET | Refills: 1 | Status: SHIPPED | OUTPATIENT
Start: 2022-10-17 | End: 2022-10-21

## 2022-10-21 ENCOUNTER — OFFICE VISIT (OUTPATIENT)
Dept: INTERNAL MEDICINE | Facility: CLINIC | Age: 67
End: 2022-10-21

## 2022-10-21 VITALS
TEMPERATURE: 97.3 F | SYSTOLIC BLOOD PRESSURE: 100 MMHG | DIASTOLIC BLOOD PRESSURE: 70 MMHG | WEIGHT: 156 LBS | HEIGHT: 69 IN | BODY MASS INDEX: 23.11 KG/M2 | OXYGEN SATURATION: 99 % | RESPIRATION RATE: 16 BRPM | HEART RATE: 46 BPM

## 2022-10-21 DIAGNOSIS — J02.9 SORE THROAT: Primary | ICD-10-CM

## 2022-10-21 PROCEDURE — 99213 OFFICE O/P EST LOW 20 MIN: CPT | Performed by: INTERNAL MEDICINE

## 2022-10-21 RX ORDER — PREDNISONE 20 MG/1
TABLET ORAL
COMMUNITY
Start: 2022-10-18 | End: 2022-12-08

## 2022-10-21 RX ORDER — AMOXICILLIN AND CLAVULANATE POTASSIUM 875; 125 MG/1; MG/1
1 TABLET, FILM COATED ORAL EVERY 12 HOURS SCHEDULED
Qty: 14 TABLET | Refills: 0 | Status: SHIPPED | OUTPATIENT
Start: 2022-10-21 | End: 2022-12-08

## 2022-10-21 NOTE — PROGRESS NOTES
Subjective   Brian Barriga is a 67 y.o. male.     Chief Complaint   Patient presents with   • Sore Throat   • Oral Swelling         Sore Throat   This is a new problem. The current episode started 1 to 4 weeks ago. The problem has been gradually improving.        The following portions of the patient's history were reviewed and updated as appropriate: allergies, current medications, past social history and problem list.    Outpatient Medications Marked as Taking for the 10/21/22 encounter (Office Visit) with Robert Light MD   Medication Sig Dispense Refill   • ALLERGY SERUM INJECTION Inject  under the skin into the appropriate area as directed 1 (One) Time Per Week.     • aspirin 81 MG EC tablet Take 81 mg by mouth Daily.     • atorvastatin (LIPITOR) 80 MG tablet Take 1 tablet by mouth Every Morning. 90 tablet 1   • dorzolamide-timolol (COSOPT) 22.3-6.8 MG/ML ophthalmic solution      • finasteride (PROSCAR) 5 MG tablet Take 1 tablet by mouth daily.     • folic acid (FOLVITE) 1 MG tablet TAKE 1 TABLET BY MOUTH EVERY DAY 90 tablet 1   • levocetirizine (XYZAL) 5 MG tablet TAKE 1 TABLET BY MOUTH EVERY EVENING 90 tablet 0   • melatonin 5 MG tablet tablet Take 5 mg by mouth At Night As Needed for Sleep.     • montelukast (SINGULAIR) 10 MG tablet TAKE 1 TABLET BY MOUTH DAILY 90 tablet 1   • Multiple Vitamin (MULTI VITAMIN DAILY PO) Take 1 tablet by mouth Daily.     • pantoprazole (PROTONIX) 40 MG EC tablet Take 1 tablet by mouth Daily. 90 tablet 3   • tadalafil (Cialis) 5 MG tablet Take 1 tablet by mouth Daily. 90 tablet 3   • valACYclovir (VALTREX) 1000 MG tablet Take 1,000 mg by mouth.     • vitamin D (ERGOCALCIFEROL) 1.25 MG (47040 UT) capsule capsule TAKE 1 CAPSULE BY MOUTH ONCE EVERY WEEK 13 capsule 3       Review of Systems   HENT: Negative for postnasal drip and rhinorrhea.        Objective   Vitals:    10/21/22 0946   BP: 100/70   Pulse: (!) 46   Resp: 16   Temp: 97.3 °F (36.3 °C)   SpO2: 99%      Wt  Readings from Last 3 Encounters:   10/21/22 70.8 kg (156 lb)   09/15/22 71.7 kg (158 lb)   06/13/22 73.9 kg (163 lb)    Body mass index is 23.03 kg/m².      Physical Exam  Constitutional:       Appearance: Normal appearance. He is well-developed.   HENT:      Right Ear: Tympanic membrane, ear canal and external ear normal.      Left Ear: Tympanic membrane, ear canal and external ear normal.   Eyes:      Conjunctiva/sclera: Conjunctivae normal.      Pupils: Pupils are equal, round, and reactive to light.   Pulmonary:      Effort: Pulmonary effort is normal. No respiratory distress.      Breath sounds: Normal breath sounds. No wheezing or rales.   Neurological:      Mental Status: He is alert.           Problems Addressed this Visit    None  Visit Diagnoses     Sore throat    -  Primary      Diagnoses       Codes Comments    Sore throat    -  Primary ICD-10-CM: J02.9  ICD-9-CM: 462         Assessment & Plan   In today with sore throat for about 1 month.  He was seen in the care center and given a Medrol Dosepak with no success.  He has had this problem in the past.  He has been treated with amoxicillin and steroids in the past and gets better for a while and then rebounds again.  He began amoxicillin 1 week ago for 1 week and it has helped.  Was prescribed prednisone by his allergist about 4 days ago but has not started it.  Not much cough.  Not much postnasal drip or runny nose.  The symptoms seem to be deep in his throat.  Also the lymph nodes in his neck.  At this point will give him another week of Augmentin 875 twice daily and he will take his prednisone as prescribed by his allergist.  That was 20 mg a day probably for 5 to 7 days.  He also complains of ptosis in the left eyelid.  He has to hold it open at times when he is driving.  There is no mattering.  Symptoms did not worsen as the day goes on.  Hard to describe this is myasthenia like just yet.  He has a new diagnosis of glaucoma and will check with his  eye doctor on his symptoms.  He is on a new eyedrop.    The above information was reviewed again today 10/21/22.  It continues to be accurate as reflected above and is unchanged.  History, physical and review of systems all reviewed and are unchanged.  Medications were reviewed today and continue the current dosing.    PPE today includes face mask and eye shield.             Dragon disclaimer:   Much of this encounter note is an electronic transcription/translation of spoken language to printed text. The electronic translation of spoken language may permit erroneous, or at times, nonsensical words or phrases to be inadvertently transcribed; Although I have reviewed the note for such errors, some may still exist.

## 2022-12-08 ENCOUNTER — OFFICE VISIT (OUTPATIENT)
Dept: INTERNAL MEDICINE | Facility: CLINIC | Age: 67
End: 2022-12-08

## 2022-12-08 VITALS
DIASTOLIC BLOOD PRESSURE: 90 MMHG | SYSTOLIC BLOOD PRESSURE: 120 MMHG | HEART RATE: 46 BPM | RESPIRATION RATE: 16 BRPM | HEIGHT: 69 IN | TEMPERATURE: 97.3 F | OXYGEN SATURATION: 100 % | BODY MASS INDEX: 23.11 KG/M2 | WEIGHT: 156 LBS

## 2022-12-08 DIAGNOSIS — I10 PRIMARY HYPERTENSION: Primary | Chronic | ICD-10-CM

## 2022-12-08 DIAGNOSIS — I48.0 PAROXYSMAL ATRIAL FIBRILLATION: ICD-10-CM

## 2022-12-08 DIAGNOSIS — I25.10 ATHEROSCLEROSIS OF NATIVE CORONARY ARTERY OF NATIVE HEART WITHOUT ANGINA PECTORIS: ICD-10-CM

## 2022-12-08 DIAGNOSIS — E78.5 HYPERLIPIDEMIA, UNSPECIFIED HYPERLIPIDEMIA TYPE: Chronic | ICD-10-CM

## 2022-12-08 DIAGNOSIS — Z15.89 HETEROZYGOUS FOR MTHFR GENE MUTATION: Chronic | ICD-10-CM

## 2022-12-08 PROCEDURE — 99214 OFFICE O/P EST MOD 30 MIN: CPT | Performed by: INTERNAL MEDICINE

## 2022-12-08 NOTE — PROGRESS NOTES
Subjective   Brian Barriga is a 67 y.o. male.     Chief Complaint   Patient presents with   • Hypertension   • Hyperlipidemia   • Coronary Artery Disease   • paf         History of Present Illness  History of MTHFR gene mutation.  History of NSAID which is been stable.  Hypertension  This is a chronic problem. The current episode started more than 1 year ago. The problem is unchanged. The problem is controlled. Associated symptoms include headaches. Pertinent negatives include no chest pain, peripheral edema or shortness of breath. Hypertensive end-organ damage includes CAD/MI and CVA. There is no history of angina or kidney disease.   Hyperlipidemia  This is a chronic problem. The current episode started more than 1 year ago. The problem is controlled. Recent lipid tests were reviewed and are normal. Factors aggravating his hyperlipidemia include beta blockers. Pertinent negatives include no chest pain or shortness of breath.   Coronary Artery Disease  Presents for follow-up visit. Pertinent negatives include no chest pain, chest pressure, chest tightness, leg swelling or shortness of breath. Risk factors include hyperlipidemia.   Atrial Fibrillation  Presents for follow-up visit. Symptoms are negative for chest pain and shortness of breath. The symptoms have been stable. Past medical history includes atrial fibrillation, CAD and hyperlipidemia. There are no medication compliance problems.   Trigeminal Neuralgia  This is a chronic problem. Associated symptoms include fatigue and headaches. Pertinent negatives include no chest pain or coughing.   Hyperglycemia  This is a chronic problem. The current episode started more than 1 month ago. Associated symptoms include fatigue and headaches. Pertinent negatives include no chest pain or coughing.        The following portions of the patient's history were reviewed and updated as appropriate: allergies, current medications, past social history and problem  list.    Outpatient Medications Marked as Taking for the 12/8/22 encounter (Office Visit) with Robert Light MD   Medication Sig Dispense Refill   • ALLERGY SERUM INJECTION Inject  under the skin into the appropriate area as directed 1 (One) Time Per Week.     • aspirin 81 MG EC tablet Take 81 mg by mouth Daily.     • atorvastatin (LIPITOR) 80 MG tablet Take 1 tablet by mouth Every Morning. 90 tablet 1   • dorzolamide-timolol (COSOPT) 22.3-6.8 MG/ML ophthalmic solution      • finasteride (PROSCAR) 5 MG tablet Take 1 tablet by mouth daily.     • folic acid (FOLVITE) 1 MG tablet TAKE 1 TABLET BY MOUTH EVERY DAY 90 tablet 1   • levocetirizine (XYZAL) 5 MG tablet TAKE 1 TABLET BY MOUTH EVERY EVENING 90 tablet 0   • montelukast (SINGULAIR) 10 MG tablet TAKE 1 TABLET BY MOUTH DAILY 90 tablet 1   • Multiple Vitamin (MULTI VITAMIN DAILY PO) Take 1 tablet by mouth Daily.     • pantoprazole (PROTONIX) 40 MG EC tablet Take 1 tablet by mouth Daily. 90 tablet 3   • tadalafil (Cialis) 5 MG tablet Take 1 tablet by mouth Daily. 90 tablet 3   • valACYclovir (VALTREX) 1000 MG tablet Take 1,000 mg by mouth.     • vitamin D (ERGOCALCIFEROL) 1.25 MG (09188 UT) capsule capsule TAKE 1 CAPSULE BY MOUTH ONCE EVERY WEEK 13 capsule 3       Review of Systems   Constitutional: Positive for fatigue.   Respiratory: Negative for cough, chest tightness and shortness of breath.    Cardiovascular: Negative for chest pain and leg swelling.   Gastrointestinal: Negative for constipation and diarrhea.   Neurological: Positive for headaches.       Objective   Vitals:    12/08/22 0848   BP: 120/90   Pulse: (!) 46   Resp: 16   Temp: 97.3 °F (36.3 °C)   SpO2: 100%          12/08/22  0848   Weight: 70.8 kg (156 lb)    [unfilled]  Body mass index is 23.03 kg/m².      Physical Exam   Constitutional: He appears well-developed.   Neck: No thyromegaly present.   Cardiovascular: Normal rate and normal heart sounds. An irregular rhythm present. Exam reveals no  gallop.   No murmur heard.  Pulmonary/Chest: Effort normal and breath sounds normal. No respiratory distress. He has no wheezes. He has no rales.   Abdominal: Soft. Normal appearance and bowel sounds are normal. He exhibits no mass. There is no abdominal tenderness. There is no guarding.   Neurological: He is alert.         Problems Addressed this Visit        Cardiac and Vasculature    Hypertension - Primary (Chronic)    Hyperlipidemia (Chronic)    Paroxysmal atrial fibrillation (HCC)   Diagnoses       Codes Comments    Primary hypertension    -  Primary ICD-10-CM: I10  ICD-9-CM: 401.9     Hyperlipidemia, unspecified hyperlipidemia type     ICD-10-CM: E78.5  ICD-9-CM: 272.4     Paroxysmal atrial fibrillation (HCC)     ICD-10-CM: I48.0  ICD-9-CM: 427.31         Assessment & Plan   In for recheck of hypertension, hyperlipidemia, CAD and PAF.  History of stroke and hypercoagulable state.  SAD.  Heterozygous MTHFR gene mutation.  Blood pressure control is excellent.  Lipids are excelllent.  No arrhythmias.   Had annual lab work 3/2022.  Gets lipids every 6 months.  Lipids have been very stable.  Urologist checks his PSA and prostate exam.  Fatigue is still present and the seem to start around February 2021.  That is better now.  He is actually had fatigue for years.  This is documented in the records.  Previous 24-hour Holter was unrevealing.  That was 2018.  I explained to patient I have no good explanation for his fatigue other than perhaps a mood disorder.  We could consider a fasting testosterone level given his chronic mild anemia.  He has Medicare but is a federal plan so his preventive exam should be covered in the future.  SAD is stable.  History of MTHFR gene mutation.  Annual wellness visit and annual preventive exam March 2022.  Decompression for TGN worked great and he is now asymptomatic.  He is now got some inflammation and ptosis of the left eye that he is dealing with.  Remains on atorvastatin 80 mg  daily for lipids and Singulair 10 mg daily for allergies and those were reviewed today.  He has questions about his MTHFR gene mutation but is heterozygous and we will check another homocystine level today.  He does take folic acid 1 mg daily he is fasting today.    The above information was reviewed again today 12/08/22.  It continues to be accurate as reflected above and is unchanged.  History, physical and review of systems all reviewed and are unchanged.  Medications were reviewed today and continue the current dosing.    PPE today includes face mask and eye shield.         Dragon disclaimer:   Much of this encounter note is an electronic transcription/translation of spoken language to printed text. The electronic translation of spoken language may permit erroneous, or at times, nonsensical words or phrases to be inadvertently transcribed; Although I have reviewed the note for such errors, some may still exist.

## 2022-12-09 LAB
CHOLEST SERPL-MCNC: 118 MG/DL (ref 0–200)
CHOLEST/HDLC SERPL: 2.57 {RATIO}
HCYS SERPL-SCNC: 7.7 UMOL/L (ref 0–17.2)
HDLC SERPL-MCNC: 46 MG/DL (ref 40–60)
LDLC SERPL CALC-MCNC: 56 MG/DL (ref 0–100)
TRIGL SERPL-MCNC: 78 MG/DL (ref 0–150)
VLDLC SERPL CALC-MCNC: 16 MG/DL (ref 5–40)

## 2022-12-27 ENCOUNTER — TELEPHONE (OUTPATIENT)
Dept: INTERNAL MEDICINE | Facility: CLINIC | Age: 67
End: 2022-12-27

## 2022-12-27 ENCOUNTER — TELEMEDICINE (OUTPATIENT)
Dept: INTERNAL MEDICINE | Facility: CLINIC | Age: 67
End: 2022-12-27

## 2022-12-27 DIAGNOSIS — U07.1 COVID-19 VIRUS INFECTION: Primary | ICD-10-CM

## 2022-12-27 PROCEDURE — 99213 OFFICE O/P EST LOW 20 MIN: CPT | Performed by: INTERNAL MEDICINE

## 2022-12-27 NOTE — TELEPHONE ENCOUNTER
Caller: Brian Barriga    Relationship to patient: Self    Best call back number: [phone]    Date of exposure: UNKOWN    Date of positive COVID19 test: 12.24.22    Date if possible COVID19 exposure: MAYBE 12.22    COVID19 symptoms: FEVER, FATIGUE, CONGESTED, HEADACHE, COUGH.     Date of initial quarantine: 12.23.22    Additional information or concerns: HAS A FEVER .1, WOULD LIKE TO KNOW IF HE SHOULD START PAXLOVID. OR COUGH MEDICINE. BEEN TAKING COUGH MEDICINE BUT IT ISNT HELPING.     What is the patients preferred pharmacy:     [id number] [address] AT [facility] [address]  - [phone] [facility] [phone]   [phone]

## 2022-12-27 NOTE — PROGRESS NOTES
Mode of Visit: Video  Location of patient: home  Location of provider: [facility]  You have chosen to receive care through a telehealth visit.  Does the patient consent to use a video/audio connection for your medical care today? Yes  The visit included audio and video interaction. No technical issues occurred during this visit.

## 2022-12-27 NOTE — PROGRESS NOTES
Subjective   Brian Barriga is a 67 y.o. male.     Chief Complaint   Patient presents with   • Fever     Sunday 12/25   • Fatigue     Friday 12/23/22   • COVID-19 Positive     Saturday 12/24/22         Fever   This is a new problem. The current episode started in the past 7 days. The problem has been unchanged. Associated symptoms include coughing and headaches. Pertinent negatives include no sore throat.        The following portions of the patient's history were reviewed and updated as appropriate: allergies, current medications, past social history and problem list.    Outpatient Medications Marked as Taking for the 12/27/22 encounter (Telemedicine) with Robert Light MD   Medication Sig Dispense Refill   • ALLERGY SERUM INJECTION Inject  under the skin into the appropriate area as directed 1 (One) Time Per Week.     • aspirin 81 MG EC tablet Take 81 mg by mouth Daily.     • atorvastatin (LIPITOR) 80 MG tablet Take 1 tablet by mouth Every Morning. 90 tablet 1   • dorzolamide-timolol (COSOPT) 22.3-6.8 MG/ML ophthalmic solution      • finasteride (PROSCAR) 5 MG tablet Take 1 tablet by mouth daily.     • folic acid (FOLVITE) 1 MG tablet TAKE 1 TABLET BY MOUTH EVERY DAY 90 tablet 1   • levocetirizine (XYZAL) 5 MG tablet TAKE 1 TABLET BY MOUTH EVERY EVENING 90 tablet 0   • montelukast (SINGULAIR) 10 MG tablet TAKE 1 TABLET BY MOUTH DAILY 90 tablet 1   • Multiple Vitamin (MULTI VITAMIN DAILY PO) Take 1 tablet by mouth Daily.     • pantoprazole (PROTONIX) 40 MG EC tablet Take 1 tablet by mouth Daily. 90 tablet 3   • tadalafil (Cialis) 5 MG tablet Take 1 tablet by mouth Daily. 90 tablet 3   • valACYclovir (VALTREX) 1000 MG tablet Take 1,000 mg by mouth.     • vitamin D (ERGOCALCIFEROL) 1.25 MG (12168 UT) capsule capsule TAKE 1 CAPSULE BY MOUTH ONCE EVERY WEEK 13 capsule 3       Review of Systems   Constitutional: Positive for fatigue.   HENT: Positive for postnasal drip. Negative for sore throat.     Respiratory: Positive for cough.    Musculoskeletal: Positive for myalgias.   Neurological: Positive for headaches.       Objective   There were no vitals filed for this visit.   Wt Readings from Last 3 Encounters:   12/08/22 70.8 kg (156 lb)   10/21/22 70.8 kg (156 lb)   09/15/22 71.7 kg (158 lb)    There is no height or weight on file to calculate BMI.      Physical Exam  Constitutional:       Appearance: Normal appearance.   Pulmonary:      Effort: Pulmonary effort is normal.   Neurological:      Mental Status: He is alert.   Psychiatric:         Mood and Affect: Mood normal.         Behavior: Behavior normal.         Thought Content: Thought content normal.         Judgment: Judgment normal.           Problems Addressed this Visit    None  Visit Diagnoses     COVID-19 virus infection    -  Primary      Diagnoses       Codes Comments    COVID-19 virus infection    -  Primary ICD-10-CM: U07.1  ICD-9-CM: 079.89         Assessment & Plan   Video visit today due to COVID pandemic.  2 days ago he developed a fever of about 100 degrees.  Took a COVID test which came back positive and that was repeated yesterday.  He has had some body aches . headache.  Cough.  He certainly qualifies for treatment given his age alone much less his various medical problems.  We discussed treating with Paxlovid today and he would like to proceed.  He is fully vaccinated and boosted.  He knows the drug is been only approved for emergency use authorization.  We also discussed quarantine which is for 5 days and must include no fever and improving symptoms at the end of 5 days . masking for an additional 5 days after that.  iwoca platform used for the visit today.    The above information was reviewed again today 12/27/22.  It continues to be accurate as reflected above and is unchanged.  History, physical and review of systems all reviewed and are unchanged.  Medications were reviewed today and continue the current dosing.              Dragon disclaimer:   Part of this note may be an electronic transcription/translation of spoken language to printed text using the Dragon Dictation System.

## 2022-12-30 ENCOUNTER — TELEPHONE (OUTPATIENT)
Dept: GASTROENTEROLOGY | Facility: CLINIC | Age: 67
End: 2022-12-30

## 2022-12-30 DIAGNOSIS — D37.9 NEOPLASM OF UNCERTAIN BEHAVIOR OF DIGESTIVE ORGAN, UNSPECIFIED: ICD-10-CM

## 2022-12-30 DIAGNOSIS — K86.89 PANCREATIC MASS: ICD-10-CM

## 2022-12-30 DIAGNOSIS — R93.5 ABNORMAL MRI OF ABDOMEN: Primary | ICD-10-CM

## 2022-12-30 DIAGNOSIS — K86.2 PANCREATIC CYST: ICD-10-CM

## 2022-12-30 RX ORDER — ERGOCALCIFEROL 1.25 MG/1
CAPSULE ORAL
Qty: 13 CAPSULE | Refills: 3 | Status: SHIPPED | OUTPATIENT
Start: 2022-12-30

## 2022-12-30 NOTE — TELEPHONE ENCOUNTER
Call to pt.  Advise per DR Ramos note.  Verb understanding.  Agreeable to MRI.  Advise [facility] will contact to arrange, or may also call [phone] to scheduled.   Pt will then contact this office to schedule f/u appt with DR Ramos.     Lab appt scheduled for 3/8 @ 10 am.      MRI and lab orders placed - message to Dr Cardenas.

## 2022-12-30 NOTE — TELEPHONE ENCOUNTER
"Tell him that the Radiologist who read his MRI of the abdomen in 3/21 had said that he should have a repeat MRI of the abdomen in  2 yrs to relook at his pancreatic cysts.  The Radiologist said:  \"IMPRESSION:  Diffuse fatty infiltration of the pancreas. Cluster of tiny  cystic foci within the pancreatic head and uncinate process are favored  to represent dilated side branches and the appearance is unchanged from  2015. Follow-up with repeat imaging in 2 years is recommended     This report was finalized on 3/11/2021 12:11 PM by Dr. Andressa Crawford M.D.\"       If he is OK with this then please order an MRI of the abdomen with and without IV contrast (Abnormal pancreas seen on previous MRI of the abdomen. Please reevaluate) to be done in 3/23. Also I would want him to have a  level. Have him f/u with me 2 weeks after the above MRI is done. [email or web address]       "

## 2023-01-14 DIAGNOSIS — E78.5 HYPERLIPIDEMIA: ICD-10-CM

## 2023-01-16 RX ORDER — FOLIC ACID 1 MG/1
TABLET ORAL
Qty: 90 TABLET | Refills: 1 | Status: SHIPPED | OUTPATIENT
Start: 2023-01-16

## 2023-03-03 ENCOUNTER — HOSPITAL ENCOUNTER (OUTPATIENT)
Dept: MRI IMAGING | Facility: HOSPITAL | Age: 68
Discharge: HOME | End: 2023-03-03
Admitting: INTERNAL MEDICINE
Payer: MEDICARE

## 2023-03-03 DIAGNOSIS — R93.5 ABNORMAL MRI OF ABDOMEN: ICD-10-CM

## 2023-03-03 PROCEDURE — 74183 MRI ABD W/O CNTR FLWD CNTR: CPT

## 2023-03-03 PROCEDURE — 82565 ASSAY OF CREATININE: CPT

## 2023-03-03 PROCEDURE — A9577 INJ MULTIHANCE: HCPCS | Performed by: INTERNAL MEDICINE

## 2023-03-03 PROCEDURE — 0 GADOBENATE DIMEGLUMINE 529 MG/ML SOLUTION: Performed by: INTERNAL MEDICINE

## 2023-03-03 RX ADMIN — GADOBENATE DIMEGLUMINE 14 ML: 529 INJECTION, SOLUTION INTRAVENOUS at 12:24

## 2023-03-05 LAB — CREAT BLDA-MCNC: 0.8 MG/DL (ref 0.6–1.3)

## 2023-03-07 NOTE — PROGRESS NOTES
03/07/23       Tell him that his MRI is unchanged from the previous MRI's and have not changed since 2015.  The radiologist is recommending that he have a repeat MRCP in 2 years to ensure stability.  Have him come see me in the office and we can discuss in more detail.  Please send a copy of this report to his PCP.  Betsy heart

## 2023-03-08 ENCOUNTER — LAB (OUTPATIENT)
Dept: LAB | Facility: HOSPITAL | Age: 68
End: 2023-03-08
Payer: MEDICARE

## 2023-03-08 PROCEDURE — 86301 IMMUNOASSAY TUMOR CA 19-9: CPT | Performed by: INTERNAL MEDICINE

## 2023-03-14 ENCOUNTER — TELEPHONE (OUTPATIENT)
Dept: GASTROENTEROLOGY | Facility: CLINIC | Age: 68
End: 2023-03-14
Payer: MEDICARE

## 2023-03-14 NOTE — TELEPHONE ENCOUNTER
----- Message from Blane Ramos MD sent at 3/7/2023  9:14 AM EST -----  03/07/23       Tell him that his MRI is unchanged from the previous MRI's and have not changed since 2015.  The radiologist is recommending that he have a repeat MRCP in 2 years to ensure stability.  Have him come see me in the office and we can discuss in more detail.  Please send a copy of this report to his PCP.  Betsy heart

## 2023-03-15 NOTE — TELEPHONE ENCOUNTER
vm left for pt to call back and schedule fu with Dr Ramos in his next available follow up spot.    OK FOR HUB TO SCHEDULE

## 2023-03-17 NOTE — TELEPHONE ENCOUNTER
Trying to find an office visit for him, since he hasn't been seen since 2/10/21    No fu spot before June    Pt is having new GI symptoms, with complaints of diarrhea.  Pt stated he had a new sexual partner and wonders if he may have  something.  He was asking ab a full hepatitis and STD panels.    Please advise on apt and labs.  Thanks

## 2023-03-20 ENCOUNTER — TELEPHONE (OUTPATIENT)
Dept: GASTROENTEROLOGY | Facility: CLINIC | Age: 68
End: 2023-03-20
Payer: MEDICARE

## 2023-03-20 NOTE — TELEPHONE ENCOUNTER
----- Message from Blane Ramos MD sent at 3/20/2023  1:12 PM EDT -----  03/20/23       Tell him that his CA 19-9 blood test (which is a pancreatic cancer tumor marker) came back normal at 9.3, which is good.  Please send a copy of this report to his PCP.  Betsy heart

## 2023-03-20 NOTE — TELEPHONE ENCOUNTER
March 18, 2023  Blane Ramos MD  to Caitlyn Berry RN        2:58 PM  If he wants to be evaluated for STD's then I would have him f/u with his PCP. [email or web address]     **VM to pt with request to contact office.  Zandra Schreiber RN.

## 2023-04-14 DIAGNOSIS — K21.9 GASTROESOPHAGEAL REFLUX DISEASE: ICD-10-CM

## 2023-04-14 RX ORDER — PANTOPRAZOLE SODIUM 40 MG/1
40 TABLET, DELAYED RELEASE ORAL DAILY
Qty: 90 TABLET | Refills: 3 | Status: SHIPPED | OUTPATIENT
Start: 2023-04-14

## 2023-07-19 ENCOUNTER — HOSPITAL ENCOUNTER (OUTPATIENT)
Dept: SLEEP MEDICINE | Facility: HOSPITAL | Age: 68
Discharge: HOME | End: 2023-07-19
Admitting: INTERNAL MEDICINE
Payer: MEDICARE

## 2023-07-19 DIAGNOSIS — G47.10 HYPERSOMNIA: ICD-10-CM

## 2023-07-19 PROCEDURE — 95810 POLYSOM 6/> YRS 4/> PARAM: CPT

## 2023-07-26 ENCOUNTER — TELEPHONE (OUTPATIENT)
Dept: SLEEP MEDICINE | Facility: HOSPITAL | Age: 68
End: 2023-07-26
Payer: MEDICARE

## 2024-04-11 NOTE — TELEPHONE ENCOUNTER
----- Message from Blane Ramos MD sent at 4/11/2021 10:13 PM EDT -----  04/11/21  Tell him that path from the EGD showed mild inflammation in the stomach but no evidence of helicobacter pylori. FAX to his PCP.   Betsy kjfelicitas     Subjective: Pt in bed NAD no issues overnight + Pain Rt Thorax     T(C): 36.8 (04-11-24 @ 08:47), Max: 37.2 (04-10-24 @ 20:44)  HR: 65 (04-11-24 @ 10:00) (58 - 80)  BP: 133/84 (04-11-24 @ 10:00) (103/64 - 151/88)  ABP: --  ABP(mean): --  RR: 16 (04-11-24 @ 10:00) (13 - 24)  SpO2: 96% (04-11-24 @ 10:00) (88% - 100%) 2 L NC   Wt(kg): --  CVP(mm Hg): --  CO: --  CI: --  PA: --                                              Tele: SR     CHEST TUBE:  5cc                             OUTPUT:     per 24 hours    AIR LEAKS:  [x ] YES [ ] NO          04-11    137  |  106  |  14  ----------------------------<  127<H>  3.7   |  26  |  1.17    Ca    8.4<L>      11 Apr 2024 11:05  Phos  2.8     04-11  Mg     2.2     04-11    TPro  5.9<L>  /  Alb  3.3  /  TBili  0.6  /  DBili  x   /  AST  22  /  ALT  37  /  AlkPhos  53  04-11                               11.7   6.87  )-----------( 147      ( 11 Apr 2024 11:05 )             34.7        PT/INR - ( 10 Apr 2024 16:43 )   PT: 10.8 sec;   INR: 0.95 ratio         PTT - ( 10 Apr 2024 16:43 )  PTT:28.4 sec         CAPILLARY BLOOD GLUCOSE               CXR: < from: CT Chest w/ IV Cont (04.10.24 @ 17:41) >  IMPRESSION:    There are markedly displaced fractures of the right posterior ninth   through seventh ribs.    Right chest tube in place. Small persistent right hemopneumothorax.    Extensive right chest wall subcutaneous emphysema.    < end of copied text >          Exam   Neuro:  Alert Awake NAD   Pulm: decreased at bases + Rt Pigtail   CV: RRR  Abd: soft   Extremities: warm   + Tenderness Rt thorax          Assessment:  64yMale    with PAST MEDICAL & SURGICAL HISTORY:

## 2024-12-17 ENCOUNTER — TELEPHONE (OUTPATIENT)
Dept: ENDOCRINOLOGY | Age: 69
End: 2024-12-17

## 2024-12-17 NOTE — TELEPHONE ENCOUNTER
Caller: Brian Barriga    Relationship: Self    Best call back number: [phone]    What orders are you requesting (i.e. lab or imaging): LABS    In what timeframe would the patient need to come in: BEFORE APPT 12/19/24    Where will you receive your lab/imaging services: PCP RE[facility] RODAS NP'S OFFICE    Additional notes: PATIENT IS REQUESTING TO HAVE ANY LABS DR DAVIDSON WANTS HIM TO HAVE BE FAXED TO HIS PCP, REBA RODAS NP,'S OFFICE. PH: [phone] FX: [phone].

## (undated) DEVICE — CANN O2 ETCO2 FITS ALL CONN CO2 SMPL A/ 7IN DISP LF

## (undated) DEVICE — BITEBLOCK OMNI BLOC

## (undated) DEVICE — LN SMPL CO2 SHTRM SD STREAM W/M LUER

## (undated) DEVICE — FRCP BX RADJAW4 NDL 2.8 240CM LG OG BX40

## (undated) DEVICE — ADAPT CLN BIOGUARD AIR/H2O DISP

## (undated) DEVICE — KT ORCA ORCAPOD DISP STRL

## (undated) DEVICE — TUBING, SUCTION, 1/4" X 10', STRAIGHT: Brand: MEDLINE

## (undated) DEVICE — SENSR O2 OXIMAX FNGR A/ 18IN NONSTR

## (undated) DEVICE — THE TORRENT IRRIGATION SCOPE CONNECTOR IS USED WITH THE TORRENT IRRIGATION TUBING TO PROVIDE IRRIGATION FLUIDS SUCH AS STERILE WATER DURING GASTROINTESTINAL ENDOSCOPIC PROCEDURES WHEN USED IN CONJUNCTION WITH AN IRRIGATION PUMP (OR ELECTROSURGICAL UNIT).: Brand: TORRENT